# Patient Record
Sex: MALE | Race: WHITE | Employment: OTHER | URBAN - METROPOLITAN AREA
[De-identification: names, ages, dates, MRNs, and addresses within clinical notes are randomized per-mention and may not be internally consistent; named-entity substitution may affect disease eponyms.]

---

## 2023-05-24 ENCOUNTER — APPOINTMENT (OUTPATIENT)
Dept: GENERAL RADIOLOGY | Age: 88
End: 2023-05-24
Payer: COMMERCIAL

## 2023-05-24 ENCOUNTER — HOSPITAL ENCOUNTER (EMERGENCY)
Age: 88
Discharge: HOME | End: 2023-05-24
Attending: EMERGENCY MEDICINE
Payer: COMMERCIAL

## 2023-05-24 VITALS
SYSTOLIC BLOOD PRESSURE: 175 MMHG | OXYGEN SATURATION: 92 % | HEIGHT: 69 IN | DIASTOLIC BLOOD PRESSURE: 85 MMHG | BODY MASS INDEX: 20.44 KG/M2 | TEMPERATURE: 97.7 F | HEART RATE: 94 BPM | WEIGHT: 138 LBS | RESPIRATION RATE: 26 BRPM

## 2023-05-24 DIAGNOSIS — J18.9 PNEUMONIA DUE TO INFECTIOUS ORGANISM, UNSPECIFIED LATERALITY, UNSPECIFIED PART OF LUNG: Primary | ICD-10-CM

## 2023-05-24 DIAGNOSIS — R77.8 ELEVATED TROPONIN I LEVEL: ICD-10-CM

## 2023-05-24 LAB
ALBUMIN SERPL-MCNC: 2.6 G/DL (ref 3.5–5.2)
ALBUMIN/GLOB SERPL: 0.7 {RATIO} (ref 1–2.5)
ALP SERPL-CCNC: 96 U/L (ref 40–129)
ALT SERPL-CCNC: 27 U/L (ref 5–41)
ANION GAP SERPL CALCULATED.3IONS-SCNC: 13 MMOL/L (ref 9–17)
AST SERPL-CCNC: 37 U/L
BACTERIA URNS QL MICRO: ABNORMAL
BASOPHILS # BLD: 0 K/UL (ref 0–0.2)
BASOPHILS NFR BLD: 0 % (ref 0–2)
BILIRUB SERPL-MCNC: 0.4 MG/DL (ref 0.3–1.2)
BILIRUB UR QL STRIP: NEGATIVE
BUN SERPL-MCNC: 12 MG/DL (ref 8–23)
CALCIUM SERPL-MCNC: 8.3 MG/DL (ref 8.6–10.4)
CHARACTER UR: ABNORMAL
CHLORIDE SERPL-SCNC: 102 MMOL/L (ref 98–107)
CLARITY UR: CLEAR
CO2 SERPL-SCNC: 22 MMOL/L (ref 20–31)
COLOR UR: YELLOW
CREAT SERPL-MCNC: 0.69 MG/DL (ref 0.7–1.2)
EOSINOPHIL # BLD: 0 K/UL (ref 0–0.4)
EOSINOPHILS RELATIVE PERCENT: 0 % (ref 1–4)
EPI CELLS #/AREA URNS HPF: ABNORMAL /HPF (ref 0–5)
ERYTHROCYTE [DISTWIDTH] IN BLOOD BY AUTOMATED COUNT: 17.2 % (ref 12.5–15.4)
GFR SERPL CREATININE-BSD FRML MDRD: >60 ML/MIN/1.73M2
GLUCOSE SERPL-MCNC: 99 MG/DL (ref 70–99)
GLUCOSE UR STRIP-MCNC: NEGATIVE MG/DL
HCT VFR BLD AUTO: 42.8 % (ref 41–53)
HGB BLD-MCNC: 13.4 G/DL (ref 13.5–17.5)
HGB UR QL STRIP.AUTO: NEGATIVE
KETONES UR STRIP-MCNC: NEGATIVE MG/DL
LEUKOCYTE ESTERASE UR QL STRIP: NEGATIVE
LYMPHOCYTES # BLD: 16 % (ref 24–44)
LYMPHOCYTES NFR BLD: 1.6 K/UL (ref 1–4.8)
MCH RBC QN AUTO: 30.1 PG (ref 26–34)
MCHC RBC AUTO-ENTMCNC: 31.3 G/DL (ref 31–37)
MCV RBC AUTO: 96.1 FL (ref 80–100)
MONOCYTES NFR BLD: 0.9 K/UL (ref 0.1–1.2)
MONOCYTES NFR BLD: 9 % (ref 2–11)
NEUTROPHILS NFR BLD: 75 % (ref 36–66)
NEUTS SEG NFR BLD: 7.4 K/UL (ref 1.8–7.7)
NITRITE UR QL STRIP: NEGATIVE
PH UR STRIP: 7 [PH] (ref 5–8)
PLATELET # BLD AUTO: 234 K/UL (ref 140–450)
PMV BLD AUTO: 9.8 FL (ref 6–12)
POTASSIUM SERPL-SCNC: 3.9 MMOL/L (ref 3.7–5.3)
PROT SERPL-MCNC: 6.5 G/DL (ref 6.4–8.3)
PROT UR STRIP-MCNC: ABNORMAL MG/DL
RBC # BLD AUTO: 4.45 M/UL (ref 4.5–5.9)
RBC #/AREA URNS HPF: ABNORMAL /HPF (ref 0–2)
SODIUM SERPL-SCNC: 137 MMOL/L (ref 135–144)
SP GR UR STRIP: 1.01 (ref 1–1.03)
TROPONIN I SERPL HS-MCNC: 27 NG/L (ref 0–22)
TROPONIN I SERPL HS-MCNC: 32 NG/L (ref 0–22)
UROBILINOGEN UR STRIP-ACNC: NORMAL
WBC #/AREA URNS HPF: ABNORMAL /HPF (ref 0–5)
WBC OTHER # BLD: 9.9 K/UL (ref 3.5–11)

## 2023-05-24 PROCEDURE — 93005 ELECTROCARDIOGRAM TRACING: CPT | Performed by: PHYSICIAN ASSISTANT

## 2023-05-24 PROCEDURE — 71045 X-RAY EXAM CHEST 1 VIEW: CPT

## 2023-05-24 PROCEDURE — 81001 URINALYSIS AUTO W/SCOPE: CPT

## 2023-05-24 PROCEDURE — 6370000000 HC RX 637 (ALT 250 FOR IP): Performed by: PHYSICIAN ASSISTANT

## 2023-05-24 PROCEDURE — 84484 ASSAY OF TROPONIN QUANT: CPT

## 2023-05-24 PROCEDURE — 36415 COLL VENOUS BLD VENIPUNCTURE: CPT

## 2023-05-24 PROCEDURE — 99285 EMERGENCY DEPT VISIT HI MDM: CPT

## 2023-05-24 PROCEDURE — 85025 COMPLETE CBC W/AUTO DIFF WBC: CPT

## 2023-05-24 PROCEDURE — 80053 COMPREHEN METABOLIC PANEL: CPT

## 2023-05-24 RX ORDER — DOXYCYCLINE HYCLATE 100 MG
100 TABLET ORAL ONCE
Status: COMPLETED | OUTPATIENT
Start: 2023-05-24 | End: 2023-05-24

## 2023-05-24 RX ORDER — ASPIRIN 81 MG/1
81 TABLET ORAL DAILY
COMMUNITY
Start: 2019-06-06

## 2023-05-24 RX ORDER — DOXYCYCLINE HYCLATE 100 MG
100 TABLET ORAL 2 TIMES DAILY
Qty: 19 TABLET | Refills: 0 | Status: SHIPPED | OUTPATIENT
Start: 2023-05-24 | End: 2023-06-03

## 2023-05-24 RX ADMIN — DOXYCYCLINE HYCLATE 100 MG: 100 TABLET ORAL at 17:45

## 2023-05-24 ASSESSMENT — ENCOUNTER SYMPTOMS
ABDOMINAL PAIN: 0
EYE REDNESS: 0
WHEEZING: 0
EYE DISCHARGE: 0
SHORTNESS OF BREATH: 1
NAUSEA: 0
SORE THROAT: 0
BACK PAIN: 0
VOMITING: 0

## 2023-05-24 ASSESSMENT — PAIN - FUNCTIONAL ASSESSMENT: PAIN_FUNCTIONAL_ASSESSMENT: NONE - DENIES PAIN

## 2023-05-24 NOTE — ED NOTES
[facility] again-on hold currently waiting for an  to answer    Contacting: Hank Christensen  Patient: Sheron Perez  To reach Dr. Eileen Osorio, please call [phone] now as this number will  in five minutes.          Bailey Martinez RN  [phone]

## 2023-05-24 NOTE — ED NOTES
[facility] for consult -Alonso Arellano PA-C phoned [address], RN  05/24/23 0257

## 2023-05-24 NOTE — ED PROVIDER NOTES
[address] [facility] Emergency Department    [address] [address] [facility] [address] [facility] [address]  Phone: [phone]  Fax: [phone]  Emergency Department  Faculty Attestation    I performed a history and physical examination of the patient and discussed management with the mid level provideer. I reviewed the mid level provider's note and agree with the documented findings and plan of care. Any areas of disagreement are noted on the chart. I was personally present for the key portions of any procedures. I have documented in the chart those procedures where I was not present during the key portions. I have reviewed the emergency nurses triage note. I agree with the chief complaint, past medical history, past surgical history, allergies, medications, social and family history as documented unless otherwise noted below. Documentation of the HPI, Physical Exam and Medical Decision Making performed by medical students or scribes is based on my personal performance of the HPI, PE and MDM. For Physician Assistant/ Nurse Practitioner cases/documentation I have personally evaluated this patient and have completed at least one if not all key elements of the E/M (history, physical exam, and MDM). Additional findings are as noted.       Primary Care Physician:  JOSHUA Taylor - CNP    CHIEF COMPLAINT       Chief Complaint   Patient presents with    Other     Pt was taken off all medications (except daily aspirin) 2 weeks PTA; states he feels off; pt reports insomnia, decreased appetite, and decreased endurance with housework; denies pain       RECENT VITALS:   Temp: 97.7 °F (36.5 °C),  Pulse: 94, Respirations: 26, BP: (!) 175/85    LABS:  Labs Reviewed   CBC WITH AUTO DIFFERENTIAL - Abnormal; Notable for the following components:       Result Value    RBC 4.45 (*)     Hemoglobin 13.4 (*)     RDW 17.2 (*)     Seg Neutrophils 75 (*)     Lymphocytes 16 (*)     Eosinophils % 0 (*)     All other components within normal
Positive for shortness of breath. Negative for wheezing. Cardiovascular:  Negative for chest pain. Gastrointestinal:  Negative for abdominal pain, nausea and vomiting. Genitourinary:  Negative for dysuria and frequency. Musculoskeletal:  Negative for back pain and neck pain. Skin:  Negative for rash. Neurological:  Negative for seizures, syncope and headaches. Psychiatric/Behavioral:  Negative for confusion. PAST MEDICAL HISTORY    has a past medical history of Abdominal aortic atherosclerosis ([facility] [address] Arthritis, BPH (benign prostatic hyperplasia), CHF (congestive heart failure) ([facility] [address] Chronic kidney disease, COPD (chronic obstructive pulmonary disease) ([facility] [address] Hearing loss, History of intermittent claudication, Hyperlipidemia, Hypertension, and Spinal stenosis. SURGICAL HISTORY      has a past surgical history that includes Coronary artery bypass graft (1989); Eye surgery; knee surgery (Bilateral); and Tonsillectomy. CURRENT MEDICATIONS       Previous Medications    ASPIRIN 81 MG EC TABLET    Take 1 tablet by mouth daily     ALLERGIES     has No Known Allergies. FAMILY HISTORY     has no family status information on file. family history is not on file. SOCIAL HISTORY     Lives with his wife. Denies tobacco use. PHYSICAL EXAM     (7 for level 4, 8 or more for level 5)    ED Triage Vitals [05/24/23 1225]   BP Temp Temp Source Pulse Respirations SpO2 Height Weight - Scale   (!) 176/104 97.7 °F (36.5 °C) Skin 94 16 94 % 5' 9\" (1.753 m) 138 lb (62.6 kg)     Physical Exam  Vitals reviewed. Constitutional:       General: He is not in acute distress. Appearance: He is not toxic-appearing. Comments: Thin. HENT:      Head: Normocephalic and atraumatic. Mouth/Throat:      Mouth: Mucous membranes are moist.   Eyes:      General: No scleral icterus. Cardiovascular:      Rate and Rhythm: Normal rate and regular rhythm. Heart sounds: Normal heart sounds.    Pulmonary:

## 2023-05-24 NOTE — DISCHARGE INSTRUCTIONS
Please read and follow all instructions. Take doxycycline twice daily over the next 10 days. Call to schedule follow-up with her primary care doctor tomorrow. Return to the ER for development of chest pain, increased difficulty breathing, persistent fever above 101 °F, or any other concerning symptoms.

## 2023-05-24 NOTE — ED NOTES
Attempted peripheral iv x1 unable to establish-having phleb do blood draw     River Cartwright, RN  05/24/23 7525

## 2023-05-25 LAB
EKG ATRIAL RATE: 95 BPM
EKG P AXIS: 93 DEGREES
EKG P-R INTERVAL: 170 MS
EKG Q-T INTERVAL: 348 MS
EKG QRS DURATION: 98 MS
EKG QTC CALCULATION (BAZETT): 437 MS
EKG R AXIS: 40 DEGREES
EKG T AXIS: 73 DEGREES
EKG VENTRICULAR RATE: 95 BPM

## 2023-08-05 RX ORDER — ASPIRIN 81 MG/1
81 TABLET ORAL DAILY
COMMUNITY
End: 2023-08-05

## 2023-08-09 PROBLEM — N40.0 BPH (BENIGN PROSTATIC HYPERPLASIA): Status: ACTIVE | Noted: 2021-10-08

## 2023-08-09 PROBLEM — I50.22 CHRONIC SYSTOLIC HEART FAILURE (HCC): Status: ACTIVE | Noted: 2023-05-22

## 2023-08-09 PROBLEM — M17.9 OA (OSTEOARTHRITIS) OF KNEE: Status: ACTIVE | Noted: 2021-10-08

## 2023-08-09 PROBLEM — A04.72 INTESTINAL INFECTION DUE TO CLOSTRIDIUM DIFFICILE: Status: ACTIVE | Noted: 2019-07-16

## 2023-08-09 PROBLEM — N18.2 CHRONIC RENAL DISEASE, STAGE II: Status: ACTIVE | Noted: 2022-03-06

## 2023-08-09 PROBLEM — I11.0 HYPERTENSIVE HEART DISEASE WITH CONGESTIVE HEART FAILURE (HCC): Status: ACTIVE | Noted: 2023-05-24

## 2023-08-09 PROBLEM — J44.9 COPD (CHRONIC OBSTRUCTIVE PULMONARY DISEASE) (HCC): Status: ACTIVE | Noted: 2023-05-03

## 2023-08-09 PROBLEM — L89.159 PRESSURE ULCER OF SACRAL REGION, UNSPECIFIED STAGE: Status: ACTIVE | Noted: 2023-05-21

## 2023-08-09 NOTE — PROGRESS NOTES
[address] Family Medicine Residency  [address] [facility], [address] [facility]  Phone: [phone]  Fax: [phone]      Date of Visit:  2023  Patient Name: Marco A Kauffman   Patient :  1925     Assessment:     1. Chronic obstructive pulmonary disease, unspecified COPD type ([address])    2. Mixed hyperlipidemia    3. Hypertensive heart disease with congestive heart failure, unspecified heart failure type ([address])    4. Atherosclerosis of native coronary artery of native heart without angina pectoris    5. Chronic systolic heart failure ([address])    6. Primary osteoarthritis of both knees    7. Age-related cataract of both eyes, unspecified age-related cataract type    8. Benign prostatic hyperplasia without lower urinary tract symptoms    9. Former smoker    8. Bilateral hearing loss, unspecified hearing loss type    11. Chronic renal disease, stage II    12. Spinal stenosis, unspecified spinal region        Plan:      Please see patient instructions. At this time I do not think that the congestive heart failure is the cause of his 10 pound weight gain. I am most concerned about him falling. I called after the visit and cancelled his upcoming cardiology appointment. There are no Patient Instructions on file for this visit.      Requested Prescriptions      No

## 2023-08-14 ENCOUNTER — OFFICE VISIT (OUTPATIENT)
Age: 88
End: 2023-08-14
Payer: COMMERCIAL

## 2023-08-14 VITALS
TEMPERATURE: 97.7 F | WEIGHT: 121 LBS | RESPIRATION RATE: 14 BRPM | BODY MASS INDEX: 17.92 KG/M2 | DIASTOLIC BLOOD PRESSURE: 69 MMHG | HEART RATE: 45 BPM | SYSTOLIC BLOOD PRESSURE: 146 MMHG | HEIGHT: 69 IN

## 2023-08-14 DIAGNOSIS — N18.2 CHRONIC RENAL DISEASE, STAGE II: ICD-10-CM

## 2023-08-14 DIAGNOSIS — I50.22 CHRONIC SYSTOLIC HEART FAILURE (HCC): ICD-10-CM

## 2023-08-14 DIAGNOSIS — N40.0 BENIGN PROSTATIC HYPERPLASIA WITHOUT LOWER URINARY TRACT SYMPTOMS: ICD-10-CM

## 2023-08-14 DIAGNOSIS — E78.2 MIXED HYPERLIPIDEMIA: ICD-10-CM

## 2023-08-14 DIAGNOSIS — H25.9 AGE-RELATED CATARACT OF BOTH EYES, UNSPECIFIED AGE-RELATED CATARACT TYPE: ICD-10-CM

## 2023-08-14 DIAGNOSIS — I25.10 ATHEROSCLEROSIS OF NATIVE CORONARY ARTERY OF NATIVE HEART WITHOUT ANGINA PECTORIS: ICD-10-CM

## 2023-08-14 DIAGNOSIS — H91.93 BILATERAL HEARING LOSS, UNSPECIFIED HEARING LOSS TYPE: ICD-10-CM

## 2023-08-14 DIAGNOSIS — J44.9 CHRONIC OBSTRUCTIVE PULMONARY DISEASE, UNSPECIFIED COPD TYPE (HCC): ICD-10-CM

## 2023-08-14 DIAGNOSIS — M17.0 PRIMARY OSTEOARTHRITIS OF BOTH KNEES: ICD-10-CM

## 2023-08-14 DIAGNOSIS — M48.00 SPINAL STENOSIS, UNSPECIFIED SPINAL REGION: Primary | ICD-10-CM

## 2023-08-14 DIAGNOSIS — I11.0 HYPERTENSIVE HEART DISEASE WITH CONGESTIVE HEART FAILURE, UNSPECIFIED HEART FAILURE TYPE (HCC): ICD-10-CM

## 2023-08-14 DIAGNOSIS — Z87.891 FORMER SMOKER: ICD-10-CM

## 2023-08-14 PROCEDURE — 1123F ACP DISCUSS/DSCN MKR DOCD: CPT | Performed by: FAMILY MEDICINE

## 2023-08-14 PROCEDURE — 99214 OFFICE O/P EST MOD 30 MIN: CPT | Performed by: FAMILY MEDICINE

## 2023-08-14 RX ORDER — FINASTERIDE 5 MG/1
5 TABLET, FILM COATED ORAL DAILY
Qty: 30 TABLET | Refills: 3 | Status: SHIPPED | OUTPATIENT
Start: 2023-08-14

## 2023-08-14 RX ORDER — FINASTERIDE 5 MG/1
5 TABLET, FILM COATED ORAL DAILY
Qty: 30 TABLET | Refills: 3 | Status: SHIPPED | OUTPATIENT
Start: 2023-08-14 | End: 2023-08-14

## 2023-08-14 SDOH — ECONOMIC STABILITY: FOOD INSECURITY: WITHIN THE PAST 12 MONTHS, THE FOOD YOU BOUGHT JUST DIDN'T LAST AND YOU DIDN'T HAVE MONEY TO GET MORE.: NEVER TRUE

## 2023-08-14 SDOH — ECONOMIC STABILITY: HOUSING INSECURITY
IN THE LAST 12 MONTHS, WAS THERE A TIME WHEN YOU DID NOT HAVE A STEADY PLACE TO SLEEP OR SLEPT IN A SHELTER (INCLUDING NOW)?: NO

## 2023-08-14 SDOH — ECONOMIC STABILITY: FOOD INSECURITY: WITHIN THE PAST 12 MONTHS, YOU WORRIED THAT YOUR FOOD WOULD RUN OUT BEFORE YOU GOT MONEY TO BUY MORE.: NEVER TRUE

## 2023-08-14 SDOH — ECONOMIC STABILITY: INCOME INSECURITY: HOW HARD IS IT FOR YOU TO PAY FOR THE VERY BASICS LIKE FOOD, HOUSING, MEDICAL CARE, AND HEATING?: NOT HARD AT ALL

## 2023-08-14 ASSESSMENT — ENCOUNTER SYMPTOMS
DIARRHEA: 0
VOMITING: 0
CONSTIPATION: 0

## 2023-08-14 ASSESSMENT — PATIENT HEALTH QUESTIONNAIRE - PHQ9
SUM OF ALL RESPONSES TO PHQ QUESTIONS 1-9: 0
SUM OF ALL RESPONSES TO PHQ9 QUESTIONS 1 & 2: 0
SUM OF ALL RESPONSES TO PHQ QUESTIONS 1-9: 0
1. LITTLE INTEREST OR PLEASURE IN DOING THINGS: 0
SUM OF ALL RESPONSES TO PHQ QUESTIONS 1-9: 0
SUM OF ALL RESPONSES TO PHQ QUESTIONS 1-9: 0
2. FEELING DOWN, DEPRESSED OR HOPELESS: 0

## 2023-08-14 NOTE — PATIENT INSTRUCTIONS
Adding back one of your prostate medicines the finasteride which you should take once a day. This should help with you getting up at night to urinate. It will not drop your blood pressure. I would like you to use Ace wraps for your lower extremity swelling. I am going to order physical therapy just for balance and home exercises. I will also give you a handicap sticker. For constipation, I would like you to get some senna as over-the-counter. Use that in the afternoon or early evening for a bowel movement in the morning when you feel like you have to go. It should be taken with water at least 6 to 8 ounces. For leg pain, I would like to keep you at less than a 5/10. I will order some Voltaren gel which you can put on your knees to see if it helps. I would like to see you back in 3 months.

## 2023-11-15 ENCOUNTER — OFFICE VISIT (OUTPATIENT)
Age: 88
End: 2023-11-15

## 2023-11-15 VITALS
RESPIRATION RATE: 14 BRPM | HEIGHT: 69 IN | WEIGHT: 121.8 LBS | BODY MASS INDEX: 18.04 KG/M2 | HEART RATE: 87 BPM | SYSTOLIC BLOOD PRESSURE: 142 MMHG | DIASTOLIC BLOOD PRESSURE: 72 MMHG | TEMPERATURE: 97.4 F

## 2023-11-15 DIAGNOSIS — S60.221A TRAUMATIC ECCHYMOSIS OF RIGHT HAND, INITIAL ENCOUNTER: Primary | ICD-10-CM

## 2023-11-15 RX ORDER — IBUPROFEN 200 MG
200 TABLET ORAL EVERY 6 HOURS PRN
COMMUNITY

## 2023-11-15 NOTE — PROGRESS NOTES
[address] [facility]. [facility] Family Medicine Residency  [address] [facility], [address] [facility]  Phone: [phone]  Fax: [phone]      Date of Visit: 11/15/2023    Patient Name: Aj Montero   Patient :  1925     ASSESSMENT/PLAN   1. Traumatic ecchymosis of right hand, initial encounter  Comments:  Bruised his right hand to forearm when putting pants on a . The spring caused the  to snap back leave swelling and discoloration. No work-up needed    Follow-up with Dr. Volanda Libman on Monday. See communications section to see instructions provided to patient  Return if symptoms worsen or fail to improve. HPI    Aj Montero is a 80 y.o. male who presents today to discuss   Chief Complaint   Patient presents with    Hand Injury     Pt states he was working on a clothes  that had a spring tension that snapped and hit his right hand. Right hand bruised partially up lower part of arm. Patient denies any pain     Aj Montero  has a past medical history of BPH (benign prostatic hyperplasia), Cataracts, bilateral, COPD (chronic obstructive pulmonary disease) ([address]), Former smoker, Hearing loss, History of intermittent claudication, Hyperlipidemia, Intestinal infection due to Clostridium difficile (2019), OA (osteoarthritis) of knee, Pneumonia, Prediabetes, and Spinal stenosis. Yesterday he was hanging up pants on hangers that are spring-loaded when the  snapped back onto his right hand. At the time of the injury he had pain that dissipated and 3 to 4 hours after the injury he noticed a 8 mm swelling centrally located on the dorsal side of his right hand. About 6 to 7 hours after the injury he started noticing discoloration that progressively spread from below the fingertips to the middle portion of his forearm. The discoloration has continued to spread (see pictures). He is currently taking aspirin 81 mg at night.   He has not noticed any changes in his ability

## 2023-11-20 ENCOUNTER — OFFICE VISIT (OUTPATIENT)
Age: 88
End: 2023-11-20
Payer: COMMERCIAL

## 2023-11-20 VITALS
SYSTOLIC BLOOD PRESSURE: 137 MMHG | HEART RATE: 88 BPM | RESPIRATION RATE: 16 BRPM | BODY MASS INDEX: 17.92 KG/M2 | DIASTOLIC BLOOD PRESSURE: 88 MMHG | HEIGHT: 69 IN | WEIGHT: 121 LBS

## 2023-11-20 DIAGNOSIS — M48.00 SPINAL STENOSIS, UNSPECIFIED SPINAL REGION: ICD-10-CM

## 2023-11-20 DIAGNOSIS — J44.9 CHRONIC OBSTRUCTIVE PULMONARY DISEASE, UNSPECIFIED COPD TYPE (HCC): ICD-10-CM

## 2023-11-20 DIAGNOSIS — I50.22 CHRONIC SYSTOLIC HEART FAILURE (HCC): ICD-10-CM

## 2023-11-20 DIAGNOSIS — M17.0 PRIMARY OSTEOARTHRITIS OF BOTH KNEES: ICD-10-CM

## 2023-11-20 DIAGNOSIS — N40.0 BENIGN PROSTATIC HYPERPLASIA WITHOUT LOWER URINARY TRACT SYMPTOMS: ICD-10-CM

## 2023-11-20 DIAGNOSIS — I11.0 HYPERTENSIVE HEART DISEASE WITH CONGESTIVE HEART FAILURE, UNSPECIFIED HEART FAILURE TYPE (HCC): ICD-10-CM

## 2023-11-20 DIAGNOSIS — Z23 NEED FOR IMMUNIZATION AGAINST INFLUENZA: Primary | ICD-10-CM

## 2023-11-20 DIAGNOSIS — N18.2 CHRONIC RENAL DISEASE, STAGE II: ICD-10-CM

## 2023-11-20 DIAGNOSIS — I25.10 ATHEROSCLEROSIS OF NATIVE CORONARY ARTERY OF NATIVE HEART WITHOUT ANGINA PECTORIS: ICD-10-CM

## 2023-11-20 PROCEDURE — 90694 VACC AIIV4 NO PRSRV 0.5ML IM: CPT | Performed by: FAMILY MEDICINE

## 2023-11-20 PROCEDURE — G0008 ADMIN INFLUENZA VIRUS VAC: HCPCS | Performed by: FAMILY MEDICINE

## 2023-11-20 PROCEDURE — 99214 OFFICE O/P EST MOD 30 MIN: CPT | Performed by: FAMILY MEDICINE

## 2023-11-20 PROCEDURE — 1123F ACP DISCUSS/DSCN MKR DOCD: CPT | Performed by: FAMILY MEDICINE

## 2023-11-20 RX ORDER — FINASTERIDE 5 MG/1
5 TABLET, FILM COATED ORAL DAILY
Qty: 30 TABLET | Refills: 0 | Status: SHIPPED | OUTPATIENT
Start: 2023-11-20 | End: 2023-11-20 | Stop reason: SDUPTHER

## 2023-11-20 RX ORDER — FINASTERIDE 5 MG/1
5 TABLET, FILM COATED ORAL DAILY
Qty: 90 TABLET | Refills: 3 | Status: ON HOLD | OUTPATIENT
Start: 2023-11-20

## 2023-11-20 ASSESSMENT — ENCOUNTER SYMPTOMS
DIARRHEA: 0
CONSTIPATION: 0
VOMITING: 0

## 2023-11-20 NOTE — PATIENT INSTRUCTIONS
Glad to see both of you today. Please wear compression stockings when you get up in the morning. You may need to try a few different sizes so that they are not too tight at night. We will keep you on your current medication for urinating but I would like you to sit down when you urinate and stay there for a few minutes before you go to bed then stand up and sit down again to try and clear out the rest of that bladder. If this is not helping, we may need to readjust your medicines. For the soreness on your buttocks, I would get a \"doughnut\" like a children's circular life preserver. This will take some of the pressure off of that area when you are sitting. If insurance will cover it, let me know and I can order it. I will see you back in 3 months.

## 2023-11-21 ENCOUNTER — APPOINTMENT (OUTPATIENT)
Dept: GENERAL RADIOLOGY | Age: 88
DRG: 871 | End: 2023-11-21
Payer: COMMERCIAL

## 2023-11-21 ENCOUNTER — TELEPHONE (OUTPATIENT)
Age: 88
End: 2023-11-21

## 2023-11-21 ENCOUNTER — APPOINTMENT (OUTPATIENT)
Dept: CT IMAGING | Age: 88
DRG: 871 | End: 2023-11-21
Payer: COMMERCIAL

## 2023-11-21 ENCOUNTER — HOSPITAL ENCOUNTER (INPATIENT)
Age: 88
LOS: 7 days | Discharge: HOME | DRG: 871 | End: 2023-11-28
Attending: EMERGENCY MEDICINE | Admitting: FAMILY MEDICINE
Payer: COMMERCIAL

## 2023-11-21 DIAGNOSIS — R06.02 SHORTNESS OF BREATH: ICD-10-CM

## 2023-11-21 DIAGNOSIS — R09.02 HYPOXIA: ICD-10-CM

## 2023-11-21 DIAGNOSIS — R53.83 FATIGUE, UNSPECIFIED TYPE: Primary | ICD-10-CM

## 2023-11-21 DIAGNOSIS — I50.21 ACUTE SYSTOLIC CONGESTIVE HEART FAILURE (HCC): ICD-10-CM

## 2023-11-21 DIAGNOSIS — I25.810 CORONARY ARTERY DISEASE INVOLVING CORONARY BYPASS GRAFT OF NATIVE HEART WITHOUT ANGINA PECTORIS: ICD-10-CM

## 2023-11-21 PROBLEM — E87.20 SEVERE SEPSIS WITH LACTIC ACIDOSIS (HCC): Status: ACTIVE | Noted: 2023-11-21

## 2023-11-21 PROBLEM — A41.9 SEVERE SEPSIS WITH LACTIC ACIDOSIS (HCC): Status: ACTIVE | Noted: 2023-11-21

## 2023-11-21 PROBLEM — J44.1 ACUTE EXACERBATION OF CHRONIC OBSTRUCTIVE PULMONARY DISEASE (COPD) (HCC): Status: ACTIVE | Noted: 2023-05-03

## 2023-11-21 PROBLEM — R65.20 SEVERE SEPSIS WITH LACTIC ACIDOSIS (HCC): Status: ACTIVE | Noted: 2023-11-21

## 2023-11-21 PROBLEM — G93.41 ACUTE METABOLIC ENCEPHALOPATHY: Status: ACTIVE | Noted: 2023-11-21

## 2023-11-21 PROBLEM — I50.9 ACUTE EXACERBATION OF CHF (CONGESTIVE HEART FAILURE) (HCC): Status: ACTIVE | Noted: 2023-11-21

## 2023-11-21 LAB
ALBUMIN SERPL-MCNC: 3.4 G/DL (ref 3.5–5.2)
ALBUMIN/GLOB SERPL: 0.9 {RATIO} (ref 1–2.5)
ALP SERPL-CCNC: 74 U/L (ref 40–129)
ALT SERPL-CCNC: 7 U/L (ref 5–41)
ANION GAP SERPL CALCULATED.3IONS-SCNC: 13 MMOL/L (ref 9–17)
AST SERPL-CCNC: 18 U/L
BASOPHILS # BLD: 0 K/UL (ref 0–0.2)
BASOPHILS NFR BLD: 0 % (ref 0–2)
BILIRUB SERPL-MCNC: 1.4 MG/DL (ref 0.3–1.2)
BNP SERPL-MCNC: ABNORMAL PG/ML
BUN SERPL-MCNC: 30 MG/DL (ref 8–23)
CALCIUM SERPL-MCNC: 8.8 MG/DL (ref 8.6–10.4)
CHLORIDE SERPL-SCNC: 96 MMOL/L (ref 98–107)
CO2 SERPL-SCNC: 27 MMOL/L (ref 20–31)
CREAT SERPL-MCNC: 1.5 MG/DL (ref 0.7–1.2)
CRP SERPL HS-MCNC: 225.5 MG/L (ref 0–5)
D DIMER PPP FEU-MCNC: 4.03 UG/ML FEU
EOSINOPHIL # BLD: 0 K/UL (ref 0–0.4)
EOSINOPHILS RELATIVE PERCENT: 0 % (ref 1–4)
ERYTHROCYTE [DISTWIDTH] IN BLOOD BY AUTOMATED COUNT: 16 % (ref 12.5–15.4)
ERYTHROCYTE [SEDIMENTATION RATE] IN BLOOD BY PHOTOMETRIC METHOD: 19 MM/HR (ref 0–20)
FLUAV AG SPEC QL: NEGATIVE
FLUBV AG SPEC QL: NEGATIVE
GFR SERPL CREATININE-BSD FRML MDRD: 42 ML/MIN/1.73M2
GLUCOSE SERPL-MCNC: 88 MG/DL (ref 70–99)
HCT VFR BLD AUTO: 39.1 % (ref 41–53)
HGB BLD-MCNC: 13.1 G/DL (ref 13.5–17.5)
LACTATE BLDV-SCNC: 1.8 MMOL/L (ref 0.5–1.9)
LACTATE BLDV-SCNC: 1.9 MMOL/L (ref 0.5–1.9)
LACTATE BLDV-SCNC: 2.8 MMOL/L (ref 0.5–2.2)
LYMPHOCYTES NFR BLD: 0.45 K/UL (ref 1–4.8)
LYMPHOCYTES RELATIVE PERCENT: 5 % (ref 24–44)
MCH RBC QN AUTO: 30.9 PG (ref 26–34)
MCHC RBC AUTO-ENTMCNC: 33.5 G/DL (ref 31–37)
MCV RBC AUTO: 92.2 FL (ref 80–100)
MONOCYTES NFR BLD: 0.27 K/UL (ref 0.1–0.8)
MONOCYTES NFR BLD: 3 % (ref 1–7)
MORPHOLOGY: ABNORMAL
MORPHOLOGY: ABNORMAL
NEUTROPHILS NFR BLD: 92 % (ref 36–66)
NEUTS SEG NFR BLD: 8.18 K/UL (ref 1.8–7.7)
PLATELET # BLD AUTO: 178 K/UL (ref 140–450)
PMV BLD AUTO: 9.9 FL (ref 6–12)
POTASSIUM SERPL-SCNC: 3.9 MMOL/L (ref 3.7–5.3)
PROT SERPL-MCNC: 7.2 G/DL (ref 6.4–8.3)
RBC # BLD AUTO: 4.25 M/UL (ref 4.5–5.9)
SARS-COV-2 RDRP RESP QL NAA+PROBE: NOT DETECTED
SODIUM SERPL-SCNC: 136 MMOL/L (ref 135–144)
SPECIMEN DESCRIPTION: NORMAL
TROPONIN I SERPL HS-MCNC: 30 NG/L (ref 0–22)
TROPONIN I SERPL HS-MCNC: 35 NG/L (ref 0–22)
TSH SERPL DL<=0.05 MIU/L-ACNC: 0.79 UIU/ML (ref 0.3–5)
WBC OTHER # BLD: 8.9 K/UL (ref 3.5–11)

## 2023-11-21 PROCEDURE — 87040 BLOOD CULTURE FOR BACTERIA: CPT

## 2023-11-21 PROCEDURE — 36415 COLL VENOUS BLD VENIPUNCTURE: CPT

## 2023-11-21 PROCEDURE — 84484 ASSAY OF TROPONIN QUANT: CPT

## 2023-11-21 PROCEDURE — 71045 X-RAY EXAM CHEST 1 VIEW: CPT

## 2023-11-21 PROCEDURE — 6360000004 HC RX CONTRAST MEDICATION: Performed by: EMERGENCY MEDICINE

## 2023-11-21 PROCEDURE — 87635 SARS-COV-2 COVID-19 AMP PRB: CPT

## 2023-11-21 PROCEDURE — 99285 EMERGENCY DEPT VISIT HI MDM: CPT

## 2023-11-21 PROCEDURE — 85025 COMPLETE CBC W/AUTO DIFF WBC: CPT

## 2023-11-21 PROCEDURE — 84443 ASSAY THYROID STIM HORMONE: CPT

## 2023-11-21 PROCEDURE — 80053 COMPREHEN METABOLIC PANEL: CPT

## 2023-11-21 PROCEDURE — 83880 ASSAY OF NATRIURETIC PEPTIDE: CPT

## 2023-11-21 PROCEDURE — 85652 RBC SED RATE AUTOMATED: CPT

## 2023-11-21 PROCEDURE — 6360000002 HC RX W HCPCS

## 2023-11-21 PROCEDURE — 83605 ASSAY OF LACTIC ACID: CPT

## 2023-11-21 PROCEDURE — 71260 CT THORAX DX C+: CPT

## 2023-11-21 PROCEDURE — 2580000003 HC RX 258

## 2023-11-21 PROCEDURE — 86140 C-REACTIVE PROTEIN: CPT

## 2023-11-21 PROCEDURE — 87804 INFLUENZA ASSAY W/OPTIC: CPT

## 2023-11-21 PROCEDURE — 1210000000 HC MED SURG R&B

## 2023-11-21 PROCEDURE — 85379 FIBRIN DEGRADATION QUANT: CPT

## 2023-11-21 PROCEDURE — 2580000003 HC RX 258: Performed by: EMERGENCY MEDICINE

## 2023-11-21 PROCEDURE — 93005 ELECTROCARDIOGRAM TRACING: CPT | Performed by: EMERGENCY MEDICINE

## 2023-11-21 RX ORDER — POLYETHYLENE GLYCOL 3350 17 G/17G
17 POWDER, FOR SOLUTION ORAL DAILY PRN
Status: DISCONTINUED | OUTPATIENT
Start: 2023-11-21 | End: 2023-11-28 | Stop reason: HOSPADM

## 2023-11-21 RX ORDER — SODIUM CHLORIDE 9 MG/ML
INJECTION, SOLUTION INTRAVENOUS PRN
Status: DISCONTINUED | OUTPATIENT
Start: 2023-11-21 | End: 2023-11-28 | Stop reason: HOSPADM

## 2023-11-21 RX ORDER — ENOXAPARIN SODIUM 100 MG/ML
30 INJECTION SUBCUTANEOUS DAILY
Status: DISCONTINUED | OUTPATIENT
Start: 2023-11-22 | End: 2023-11-26

## 2023-11-21 RX ORDER — SODIUM CHLORIDE 0.9 % (FLUSH) 0.9 %
5-40 SYRINGE (ML) INJECTION EVERY 12 HOURS SCHEDULED
Status: DISCONTINUED | OUTPATIENT
Start: 2023-11-21 | End: 2023-11-28 | Stop reason: HOSPADM

## 2023-11-21 RX ORDER — ACETAMINOPHEN 650 MG/1
650 SUPPOSITORY RECTAL EVERY 6 HOURS PRN
Status: DISCONTINUED | OUTPATIENT
Start: 2023-11-21 | End: 2023-11-28 | Stop reason: HOSPADM

## 2023-11-21 RX ORDER — ATORVASTATIN CALCIUM 10 MG/1
10 TABLET, FILM COATED ORAL DAILY
Status: DISCONTINUED | OUTPATIENT
Start: 2023-11-22 | End: 2023-11-28 | Stop reason: HOSPADM

## 2023-11-21 RX ORDER — SODIUM CHLORIDE 0.9 % (FLUSH) 0.9 %
5-40 SYRINGE (ML) INJECTION PRN
Status: DISCONTINUED | OUTPATIENT
Start: 2023-11-21 | End: 2023-11-28 | Stop reason: HOSPADM

## 2023-11-21 RX ORDER — 0.9 % SODIUM CHLORIDE 0.9 %
80 INTRAVENOUS SOLUTION INTRAVENOUS ONCE
Status: DISCONTINUED | OUTPATIENT
Start: 2023-11-21 | End: 2023-11-28 | Stop reason: HOSPADM

## 2023-11-21 RX ORDER — ACETAMINOPHEN 325 MG/1
650 TABLET ORAL EVERY 6 HOURS PRN
Status: DISCONTINUED | OUTPATIENT
Start: 2023-11-21 | End: 2023-11-28 | Stop reason: HOSPADM

## 2023-11-21 RX ORDER — 0.9 % SODIUM CHLORIDE 0.9 %
30 INTRAVENOUS SOLUTION INTRAVENOUS ONCE
Status: DISCONTINUED | OUTPATIENT
Start: 2023-11-21 | End: 2023-11-28 | Stop reason: HOSPADM

## 2023-11-21 RX ORDER — ONDANSETRON 4 MG/1
4 TABLET, ORALLY DISINTEGRATING ORAL EVERY 8 HOURS PRN
Status: DISCONTINUED | OUTPATIENT
Start: 2023-11-21 | End: 2023-11-28 | Stop reason: HOSPADM

## 2023-11-21 RX ORDER — SODIUM CHLORIDE 0.9 % (FLUSH) 0.9 %
10 SYRINGE (ML) INJECTION PRN
Status: DISCONTINUED | OUTPATIENT
Start: 2023-11-21 | End: 2023-11-28 | Stop reason: HOSPADM

## 2023-11-21 RX ORDER — ONDANSETRON 2 MG/ML
4 INJECTION INTRAMUSCULAR; INTRAVENOUS EVERY 6 HOURS PRN
Status: DISCONTINUED | OUTPATIENT
Start: 2023-11-21 | End: 2023-11-28 | Stop reason: HOSPADM

## 2023-11-21 RX ORDER — FINASTERIDE 5 MG/1
5 TABLET, FILM COATED ORAL DAILY
Status: DISCONTINUED | OUTPATIENT
Start: 2023-11-22 | End: 2023-11-28 | Stop reason: HOSPADM

## 2023-11-21 RX ORDER — SODIUM CHLORIDE 9 MG/ML
INJECTION, SOLUTION INTRAVENOUS CONTINUOUS
Status: DISCONTINUED | OUTPATIENT
Start: 2023-11-21 | End: 2023-11-23

## 2023-11-21 RX ORDER — ASPIRIN 81 MG/1
81 TABLET ORAL DAILY
Status: DISCONTINUED | OUTPATIENT
Start: 2023-11-22 | End: 2023-11-28 | Stop reason: ALTCHOICE

## 2023-11-21 RX ORDER — SIMVASTATIN 20 MG
20 TABLET ORAL NIGHTLY
Status: ON HOLD | COMMUNITY
End: 2023-11-28 | Stop reason: HOSPADM

## 2023-11-21 RX ADMIN — SODIUM CHLORIDE: 9 INJECTION, SOLUTION INTRAVENOUS at 21:16

## 2023-11-21 RX ADMIN — Medication 80 ML: at 19:00

## 2023-11-21 RX ADMIN — SODIUM CHLORIDE, PRESERVATIVE FREE 10 ML: 5 INJECTION INTRAVENOUS at 19:00

## 2023-11-21 RX ADMIN — CEFEPIME 2000 MG: 2 INJECTION, POWDER, FOR SOLUTION INTRAVENOUS at 21:18

## 2023-11-21 RX ADMIN — IOPAMIDOL 75 ML: 755 INJECTION, SOLUTION INTRAVENOUS at 18:56

## 2023-11-21 ASSESSMENT — PAIN SCALES - GENERAL: PAINLEVEL_OUTOF10: 0

## 2023-11-21 ASSESSMENT — ENCOUNTER SYMPTOMS
SORE THROAT: 0
DIARRHEA: 0
WHEEZING: 0
SHORTNESS OF BREATH: 0
VOMITING: 0
BACK PAIN: 0
COUGH: 0
ABDOMINAL PAIN: 0
NAUSEA: 0

## 2023-11-21 ASSESSMENT — PAIN - FUNCTIONAL ASSESSMENT: PAIN_FUNCTIONAL_ASSESSMENT: 0-10

## 2023-11-21 NOTE — ED PROVIDER NOTES
straw.   4. No penetration or aspiration with the remainder of the administered   substances. Please see separate speech pathology report for full discussion of findings   and recommendations. XR CHEST PORTABLE   Final Result   More prominent basilar opacities, which may be due to a combination of   increasing atelectasis/airspace disease as well as effusions. CT CHEST PULMONARY EMBOLISM W CONTRAST   Final Result   1. No pulmonary embolism. 2. Confluent airspace opacities in the lower lobes and right middle lobe   which involve the right lower lobe the greatest degree. These are   nonspecific, but pneumonia is favored. There is also mucous plugging in the   lower lobes. 3. Moderate right and small left pleural effusions. 4. Borderline cardiomegaly. XR CHEST PORTABLE   Final Result   Chronic bilateral basal infiltrates similar to prior examination. FL ESOPHAGRAM    (Results Pending)       I have reviewed the disposition diagnosis with the patient and or their family/guardian. I have answered their questions and givendischarge instructions. They voiced understanding of these instructions and did not have any further questions or complaints. PROCEDURES:  Unless otherwise noted below, none     Procedures    FINAL IMPRESSION      1. Fatigue, unspecified type    2. Hypoxia    3. Acute systolic congestive heart failure ([address])    4. Shortness of breath    5.  Coronary artery disease involving coronary bypass graft of native heart without angina pectoris          DISPOSITION/PLAN   DISPOSITION Admitted 11/21/2023 04:51:09 PM      PATIENT REFERRED TO:  Yecenia Fry MD  [address] [facility]  [phone]    Follow up in 2 day(s)  Call to schedule post-hospitalization follow-up    Gunjan Diane MD  [address] [facility] [phone]    Follow up in 2 week(s)  Call to schedule post-hospitalization follow-up    Radha Mendenhall, [address] [place] [address]

## 2023-11-21 NOTE — TELEPHONE ENCOUNTER
Patient wife called and stated that the \"patient has lost his mind and he is going crazy\". Patient wife was told to take patient to the ER.

## 2023-11-21 NOTE — H&P
therapies and most importantly because of direct risk to patient if care not provided in a hospital setting. Expected length of stay > 48 hours. Patient is admitted in the Med/Surge    Telemetry: Continuous  Anticoagulation: Lovenox  Dispo: Home assisted living  CODE STATUS DNR CCA    Patient was seen, evaluated and discussed with MD Ketan Spann MD  Family Medicine Resident, PGY-1   11/21/2023  10:25 PM    Senior Resident Attestation:     I have independently seen Patient and discussed the assessment and plan with the resident listed above and the attending physician Dr. Debra Yang. I have reviewed the note and have edited it as appropriate.       Brayan Tang MD  Family Medicine Resident, PGY-3  11/21/2023       Copy sent to Dr. Waqas Godfrey MD

## 2023-11-21 NOTE — TELEPHONE ENCOUNTER
After lunch yesterday, Edy Norris became very sick. Throwing up, fever, chills, soiled himself and was a little disoriented. Today he is fine up walking around and feeling much better. No fever. Ace Lomeli wanted to let you know.

## 2023-11-22 ENCOUNTER — APPOINTMENT (OUTPATIENT)
Age: 88
DRG: 871 | End: 2023-11-22
Payer: COMMERCIAL

## 2023-11-22 PROBLEM — J90 BILATERAL PLEURAL EFFUSION: Status: ACTIVE | Noted: 2023-11-22

## 2023-11-22 PROBLEM — Z91.89 AT RISK FOR FLUID VOLUME OVERLOAD: Status: ACTIVE | Noted: 2023-11-22

## 2023-11-22 PROBLEM — R00.0 TACHYCARDIA: Status: ACTIVE | Noted: 2023-11-22

## 2023-11-22 PROBLEM — I51.7 CARDIOMEGALY: Status: ACTIVE | Noted: 2023-11-22

## 2023-11-22 LAB
ANION GAP SERPL CALCULATED.3IONS-SCNC: 10 MMOL/L (ref 9–17)
ANION GAP SERPL CALCULATED.3IONS-SCNC: 13 MMOL/L (ref 9–17)
BASOPHILS # BLD: 0 K/UL (ref 0–0.2)
BASOPHILS NFR BLD: 0 % (ref 0–2)
BUN SERPL-MCNC: 34 MG/DL (ref 8–23)
BUN SERPL-MCNC: 34 MG/DL (ref 8–23)
CALCIUM SERPL-MCNC: 8.5 MG/DL (ref 8.6–10.4)
CALCIUM SERPL-MCNC: 8.6 MG/DL (ref 8.6–10.4)
CHLORIDE SERPL-SCNC: 102 MMOL/L (ref 98–107)
CHLORIDE SERPL-SCNC: 102 MMOL/L (ref 98–107)
CO2 SERPL-SCNC: 22 MMOL/L (ref 20–31)
CO2 SERPL-SCNC: 26 MMOL/L (ref 20–31)
CREAT SERPL-MCNC: 1.1 MG/DL (ref 0.7–1.2)
CREAT SERPL-MCNC: 1.1 MG/DL (ref 0.7–1.2)
ECHO AO ASC DIAM: 3.9 CM
ECHO AO ASCENDING AORTA INDEX: 2.34 CM/M2
ECHO AO ROOT DIAM: 3.3 CM
ECHO AO ROOT INDEX: 1.98 CM/M2
ECHO AV AREA PEAK VELOCITY: 2.9 CM2
ECHO AV AREA/BSA PEAK VELOCITY: 1.7 CM2/M2
ECHO AV PEAK GRADIENT: 4 MMHG
ECHO AV PEAK VELOCITY: 1 M/S
ECHO AV VELOCITY RATIO: 0.9
ECHO BSA: 1.66 M2
ECHO EST RA PRESSURE: 10 MMHG
ECHO IVC PROX: 1.7 CM
ECHO LA AREA 2C: 16 CM2
ECHO LA AREA 4C: 15.6 CM2
ECHO LA DIAMETER INDEX: 2.28 CM/M2
ECHO LA DIAMETER: 3.8 CM
ECHO LA MAJOR AXIS: 5.5 CM
ECHO LA MINOR AXIS: 5.2 CM
ECHO LA TO AORTIC ROOT RATIO: 1.15
ECHO LA VOL BP: 37 ML (ref 18–58)
ECHO LA VOL MOD A2C: 40 ML (ref 18–58)
ECHO LA VOL MOD A4C: 33 ML (ref 18–58)
ECHO LA VOL/BSA BIPLANE: 22 ML/M2 (ref 16–34)
ECHO LA VOLUME INDEX MOD A2C: 24 ML/M2 (ref 16–34)
ECHO LA VOLUME INDEX MOD A4C: 20 ML/M2 (ref 16–34)
ECHO LV E' LATERAL VELOCITY: 8 CM/S
ECHO LV E' SEPTAL VELOCITY: 6 CM/S
ECHO LV EDV A2C: 96 ML
ECHO LV EDV A4C: 104 ML
ECHO LV EDV INDEX A4C: 62 ML/M2
ECHO LV EDV NDEX A2C: 57 ML/M2
ECHO LV EJECTION FRACTION A2C: 25 %
ECHO LV EJECTION FRACTION A4C: 25 %
ECHO LV EJECTION FRACTION BIPLANE: 26 % (ref 55–100)
ECHO LV ESV A2C: 72 ML
ECHO LV ESV A4C: 78 ML
ECHO LV ESV INDEX A2C: 43 ML/M2
ECHO LV ESV INDEX A4C: 47 ML/M2
ECHO LV FRACTIONAL SHORTENING: 7 % (ref 28–44)
ECHO LV INTERNAL DIMENSION DIASTOLE INDEX: 3.41 CM/M2
ECHO LV INTERNAL DIMENSION DIASTOLIC: 5.7 CM (ref 4.2–5.9)
ECHO LV INTERNAL DIMENSION SYSTOLIC INDEX: 3.17 CM/M2
ECHO LV INTERNAL DIMENSION SYSTOLIC: 5.3 CM
ECHO LV IVSD: 1.1 CM (ref 0.6–1)
ECHO LV MASS 2D: 226.4 G (ref 88–224)
ECHO LV MASS INDEX 2D: 135.5 G/M2 (ref 49–115)
ECHO LV POSTERIOR WALL DIASTOLIC: 0.9 CM (ref 0.6–1)
ECHO LV RELATIVE WALL THICKNESS RATIO: 0.32
ECHO LVOT AREA: 3.5 CM2
ECHO LVOT DIAM: 2.1 CM
ECHO LVOT PEAK GRADIENT: 3 MMHG
ECHO LVOT PEAK VELOCITY: 0.9 M/S
ECHO MV A VELOCITY: 1.08 M/S
ECHO MV E DECELERATION TIME (DT): 95 MS
ECHO MV E VELOCITY: 0.52 M/S
ECHO MV E/A RATIO: 0.48
ECHO MV E/E' LATERAL: 6.5
ECHO MV E/E' RATIO (AVERAGED): 7.58
ECHO PV MAX VELOCITY: 0.8 M/S
ECHO PV PEAK GRADIENT: 3 MMHG
ECHO RA AREA 4C: 13.9 CM2
ECHO RA END SYSTOLIC VOLUME APICAL 4 CHAMBER INDEX BSA: 21 ML/M2
ECHO RA VOLUME: 35 ML
ECHO RIGHT VENTRICULAR SYSTOLIC PRESSURE (RVSP): 33 MMHG
ECHO RV BASAL DIMENSION: 3.5 CM
ECHO RV FREE WALL PEAK S': 12 CM/S
ECHO TV REGURGITANT MAX VELOCITY: 2.4 M/S
ECHO TV REGURGITANT PEAK GRADIENT: 23 MMHG
EKG ATRIAL RATE: 94 BPM
EKG P AXIS: 109 DEGREES
EKG P-R INTERVAL: 182 MS
EKG Q-T INTERVAL: 356 MS
EKG QRS DURATION: 94 MS
EKG QTC CALCULATION (BAZETT): 445 MS
EKG R AXIS: 29 DEGREES
EKG T AXIS: 139 DEGREES
EKG VENTRICULAR RATE: 94 BPM
EOSINOPHIL # BLD: 0 K/UL (ref 0–0.4)
EOSINOPHILS RELATIVE PERCENT: 0 % (ref 1–4)
ERYTHROCYTE [DISTWIDTH] IN BLOOD BY AUTOMATED COUNT: 15.9 % (ref 12.5–15.4)
GFR SERPL CREATININE-BSD FRML MDRD: >60 ML/MIN/1.73M2
GFR SERPL CREATININE-BSD FRML MDRD: >60 ML/MIN/1.73M2
GLUCOSE SERPL-MCNC: 76 MG/DL (ref 70–99)
GLUCOSE SERPL-MCNC: 86 MG/DL (ref 70–99)
HCT VFR BLD AUTO: 38.9 % (ref 41–53)
HGB BLD-MCNC: 13.1 G/DL (ref 13.5–17.5)
LYMPHOCYTES NFR BLD: 0.5 K/UL (ref 1–4.8)
LYMPHOCYTES RELATIVE PERCENT: 7 % (ref 24–44)
MAGNESIUM SERPL-MCNC: 2 MG/DL (ref 1.6–2.6)
MAGNESIUM SERPL-MCNC: 2.1 MG/DL (ref 1.6–2.6)
MCH RBC QN AUTO: 31 PG (ref 26–34)
MCHC RBC AUTO-ENTMCNC: 33.6 G/DL (ref 31–37)
MCV RBC AUTO: 92.2 FL (ref 80–100)
MONOCYTES NFR BLD: 0.1 K/UL (ref 0.1–1.2)
MONOCYTES NFR BLD: 2 % (ref 2–11)
NEUTROPHILS NFR BLD: 91 % (ref 36–66)
NEUTS SEG NFR BLD: 7.1 K/UL (ref 1.8–7.7)
PLATELET # BLD AUTO: 154 K/UL (ref 140–450)
PMV BLD AUTO: 9.6 FL (ref 6–12)
POTASSIUM SERPL-SCNC: 3.9 MMOL/L (ref 3.7–5.3)
POTASSIUM SERPL-SCNC: 4.4 MMOL/L (ref 3.7–5.3)
RBC # BLD AUTO: 4.22 M/UL (ref 4.5–5.9)
SODIUM SERPL-SCNC: 137 MMOL/L (ref 135–144)
SODIUM SERPL-SCNC: 138 MMOL/L (ref 135–144)
WBC OTHER # BLD: 7.8 K/UL (ref 3.5–11)

## 2023-11-22 PROCEDURE — 6370000000 HC RX 637 (ALT 250 FOR IP): Performed by: SURGERY

## 2023-11-22 PROCEDURE — 97162 PT EVAL MOD COMPLEX 30 MIN: CPT

## 2023-11-22 PROCEDURE — 97535 SELF CARE MNGMENT TRAINING: CPT

## 2023-11-22 PROCEDURE — 2580000003 HC RX 258

## 2023-11-22 PROCEDURE — 93306 TTE W/DOPPLER COMPLETE: CPT | Performed by: INTERNAL MEDICINE

## 2023-11-22 PROCEDURE — 2500000003 HC RX 250 WO HCPCS

## 2023-11-22 PROCEDURE — 99223 1ST HOSP IP/OBS HIGH 75: CPT | Performed by: FAMILY MEDICINE

## 2023-11-22 PROCEDURE — 6360000002 HC RX W HCPCS

## 2023-11-22 PROCEDURE — 97116 GAIT TRAINING THERAPY: CPT

## 2023-11-22 PROCEDURE — 97166 OT EVAL MOD COMPLEX 45 MIN: CPT

## 2023-11-22 PROCEDURE — 80048 BASIC METABOLIC PNL TOTAL CA: CPT

## 2023-11-22 PROCEDURE — 99223 1ST HOSP IP/OBS HIGH 75: CPT | Performed by: SURGERY

## 2023-11-22 PROCEDURE — 6370000000 HC RX 637 (ALT 250 FOR IP)

## 2023-11-22 PROCEDURE — 36415 COLL VENOUS BLD VENIPUNCTURE: CPT

## 2023-11-22 PROCEDURE — 83735 ASSAY OF MAGNESIUM: CPT

## 2023-11-22 PROCEDURE — 1210000000 HC MED SURG R&B

## 2023-11-22 PROCEDURE — 85025 COMPLETE CBC W/AUTO DIFF WBC: CPT

## 2023-11-22 PROCEDURE — 93306 TTE W/DOPPLER COMPLETE: CPT

## 2023-11-22 RX ORDER — POTASSIUM CHLORIDE 7.45 MG/ML
10 INJECTION INTRAVENOUS PRN
Status: DISCONTINUED | OUTPATIENT
Start: 2023-11-22 | End: 2023-11-28 | Stop reason: HOSPADM

## 2023-11-22 RX ORDER — FUROSEMIDE 10 MG/ML
20 INJECTION INTRAMUSCULAR; INTRAVENOUS ONCE
Status: COMPLETED | OUTPATIENT
Start: 2023-11-22 | End: 2023-11-22

## 2023-11-22 RX ORDER — MAGNESIUM SULFATE IN WATER 40 MG/ML
2000 INJECTION, SOLUTION INTRAVENOUS PRN
Status: DISCONTINUED | OUTPATIENT
Start: 2023-11-22 | End: 2023-11-28 | Stop reason: HOSPADM

## 2023-11-22 RX ORDER — POTASSIUM CHLORIDE 20 MEQ/1
40 TABLET, EXTENDED RELEASE ORAL PRN
Status: DISCONTINUED | OUTPATIENT
Start: 2023-11-22 | End: 2023-11-28 | Stop reason: HOSPADM

## 2023-11-22 RX ORDER — FUROSEMIDE 10 MG/ML
20 INJECTION INTRAMUSCULAR; INTRAVENOUS DAILY
Status: DISCONTINUED | OUTPATIENT
Start: 2023-11-23 | End: 2023-11-28 | Stop reason: HOSPADM

## 2023-11-22 RX ORDER — METOPROLOL TARTRATE 1 MG/ML
2.5 INJECTION, SOLUTION INTRAVENOUS ONCE
Status: COMPLETED | OUTPATIENT
Start: 2023-11-22 | End: 2023-11-22

## 2023-11-22 RX ADMIN — SODIUM CHLORIDE, PRESERVATIVE FREE 10 ML: 5 INJECTION INTRAVENOUS at 09:29

## 2023-11-22 RX ADMIN — METOPROLOL TARTRATE 25 MG: 25 TABLET, FILM COATED ORAL at 20:54

## 2023-11-22 RX ADMIN — ASPIRIN 81 MG: 81 TABLET, COATED ORAL at 10:04

## 2023-11-22 RX ADMIN — ATORVASTATIN CALCIUM 10 MG: 10 TABLET, FILM COATED ORAL at 10:04

## 2023-11-22 RX ADMIN — METOPROLOL TARTRATE 25 MG: 25 TABLET, FILM COATED ORAL at 14:26

## 2023-11-22 RX ADMIN — FUROSEMIDE 20 MG: 10 INJECTION, SOLUTION INTRAMUSCULAR; INTRAVENOUS at 10:03

## 2023-11-22 RX ADMIN — CEFEPIME 2000 MG: 2 INJECTION, POWDER, FOR SOLUTION INTRAVENOUS at 21:04

## 2023-11-22 RX ADMIN — ENOXAPARIN SODIUM 30 MG: 100 INJECTION SUBCUTANEOUS at 10:05

## 2023-11-22 RX ADMIN — FINASTERIDE 5 MG: 5 TABLET, FILM COATED ORAL at 10:04

## 2023-11-22 RX ADMIN — METOPROLOL TARTRATE 2.5 MG: 5 INJECTION INTRAVENOUS at 09:25

## 2023-11-22 ASSESSMENT — ENCOUNTER SYMPTOMS
TROUBLE SWALLOWING: 1
CONSTIPATION: 0
SHORTNESS OF BREATH: 1
WHEEZING: 1
COUGH: 1
DIARRHEA: 0
ABDOMINAL PAIN: 0

## 2023-11-22 ASSESSMENT — PAIN SCALES - GENERAL
PAINLEVEL_OUTOF10: 0
PAINLEVEL_OUTOF10: 0

## 2023-11-22 NOTE — ED NOTES
Report called to inpt nurse izabela.  Sent message through [facility] to resident regarding lactic acid and ct results back     Rasheed Hutchins RN  11/21/23 2039

## 2023-11-22 NOTE — CARE COORDINATION
Case Management Assessment  Initial Evaluation    Date/Time of Evaluation: 11/22/2023 10:51 AM  Assessment Completed by: Omar Tolbert RN    If patient is discharged prior to next notation, then this note serves as note for discharge by case management. Patient Name: Aj Montero                   YOB: 1925  Diagnosis: Shortness of breath [R06.02]  Hypoxia [B84.91]  Acute systolic congestive heart failure ([address]) [I50.21]  Fatigue, unspecified type [R53.83]                   Date / Time: 11/21/2023  3:54 PM    Patient Admission Status: Inpatient   Readmission Risk (Low < 19, Mod (19-27), High > 27): Readmission Risk Score: 11.1    Current PCP: Brian Miguel MD  PCP verified by CM? Yes    Chart Reviewed: Yes      History Provided by: Patient  Patient Orientation: Alert and Oriented    Patient Cognition: Alert    Hospitalization in the last 30 days (Readmission):  No    If yes, Readmission Assessment in CM Navigator will be completed. Advance Directives:      Code Status: DNR-CCA   Patient's Primary Decision Maker is: Legal Next of Kin    Primary Decision Maker: Ashley Leon - Spouse - [phone]    Discharge Planning:    Patient lives with:   Type of Home: Apartment  Primary Care Giver: Self  Patient Support Systems include: Spouse/Significant Other   Current Financial resources: Medicare  Current community resources: None  Current services prior to admission: Durable Medical Equipment            Current DME: Shower Chair, Walker, Cane            Type of Home Care services:  None    ADLS  Prior functional level: Independent in ADLs/IADLs  Current functional level:  (Await PT/OT eval)    PT AM-PAC:   /24  OT AM-PAC: 16 /24    Family can provide assistance at DC: No  Would you like Case Management to discuss the discharge plan with any other family members/significant others, and if so, who?     Plans to Return to Present Housing: Unknown at present  Other Identified Issues/Barriers to RETURNING

## 2023-11-22 NOTE — CONSULTS
[facility]    Consult                        Today's Date: 11/22/2023  Patient Name: Smitha Braun  Date of admission: 11/21/2023  3:54 PM  Patient's age: 80 y.o., 8/14/1925  Admission Dx: Shortness of breath [R06.02]  Hypoxia [E52.24]  Acute systolic congestive heart failure (HCC) [I50.21]  Fatigue, unspecified type [R53.83]    Reason for Consult:  Cardiac evaluation    Requesting Physician: Gadiel Medel MD    CHIEF COMPLAINT: Fatigue and not feeling well    History Obtained From:  patient, electronic medical record    HISTORY OF PRESENT ILLNESS:      The patient is a 80 y. o.  with medical history of history of CABG x 4 in 1993 , dyslipidemia , primary hypertension BPH, COPD, Former smoker, History of intermittent claudication,  osteoarthritis of knee (takes tylenol, uses a cane), Prediabetes, and Spinal stenosis who presented with fatigue and shortness of breath and was found to have severe sepsis secondary to pneumonia . CT of chest reveled moderate right and small pleural effusions with borderline cardiomegaly . Cardiology was consulted CHF exacerbation. Patient is poor historian and does not recall home was his cardiologist in the past, but has been followed up with [facility] cardiology seen for 6-month ago for nonsustained VT follow-up from [facility]  Past Medical History:   has a past medical history of BPH (benign prostatic hyperplasia), Cataracts, bilateral, COPD (chronic obstructive pulmonary disease) ([address]), Former smoker, Hearing loss, History of intermittent claudication, Hyperlipidemia, Intestinal infection due to Clostridium difficile, OA (osteoarthritis) of knee, Pneumonia, Prediabetes, and Spinal stenosis. Past Surgical History:   has a past surgical history that includes Coronary artery bypass graft (1989); Eye surgery (1991); knee surgery (Bilateral); and Tonsillectomy (1931).      Home Medications:    Prior to Admission medications    Medication Sig Start Date

## 2023-11-22 NOTE — CONSULTS
[address] [facility] PULMONARY, CRITICAL CARE & SLEEP  Eloy Grana, MD Verlean Mantis, MD Lenise Goodpasture MD Americo Hang MD Altamese Ares MD  [facility] APRN  Roger Sanchez APRN   CONSULT/H&P NOTE      Date of Admission: 11/21/2023  3:54 PM    Chief complaint: Altered mental status, acute respiratory distress, shortness of breath    Referring Physician: Stephanie Duncan MD  PCP: Agiular Burns MD     History of Present Illness: Dipti Ayon is a 80 y.o. male with past medical history of COPD (former smoker), CAD, and BPH who presents with fatigue and shortness of breath. Patient is accompanied by his wife. Patient and his wife both live at [facility] living [place]. Patient was brought to the emergency department yesterday after he began experiencing shortness of breath, lightheadedness, dizziness, fatigue, difficulty ambulating, and trouble breathing. ED course: Temp: 95.9, O2 sat: 87%, otherwise vitals were within normal limits. Initial work-up in the ED included CBC, CMP, BNP, CRP, ESR, TSH, troponins, D-dimer, lactic acid, UA with culture, and blood cultures x2. Labs were notable for proBNP: 10,700, troponin: 35 at highest,  CRP: 225.5,  BUN: 30,  Cr: 1.5,  GFR: 42, TSH: 0.79, D-dimer elevated at 4.03, and lactic acid: 2.8 (repeat 1.8, 1.9). Otherwise, labs were within normal limits. CXR showed chronic bilateral basilar infiltrates consistent with prior exam.  CT chest showed no evidence of PE, moderate right and small left pleural effusions, and bilateral lower lobe and right middle lobe airspace opacities, right greater than left, also notable for mucous plugging. These findings were nonspecific, however pneumonia is favored. Patient per the patient's chart, he has a history of COPD. He was a former smoker who quit 60 years ago. Given his history, COPD is highly unlikely. No PFTs or spirometry have been noted in the chart.   Patient had an ED visit back in May which showed

## 2023-11-23 PROBLEM — I49.3 PVC'S (PREMATURE VENTRICULAR CONTRACTIONS): Status: ACTIVE | Noted: 2023-11-23

## 2023-11-23 PROBLEM — I47.29 NSVT (NONSUSTAINED VENTRICULAR TACHYCARDIA) (HCC): Status: ACTIVE | Noted: 2023-11-23

## 2023-11-23 PROBLEM — I50.21 ACUTE SYSTOLIC CONGESTIVE HEART FAILURE (HCC): Status: ACTIVE | Noted: 2023-11-23

## 2023-11-23 LAB
ANION GAP SERPL CALCULATED.3IONS-SCNC: 11 MMOL/L (ref 9–17)
BASOPHILS # BLD: 0 K/UL (ref 0–0.2)
BASOPHILS NFR BLD: 0 % (ref 0–2)
BNP SERPL-MCNC: 8546 PG/ML
BUN SERPL-MCNC: 35 MG/DL (ref 8–23)
CALCIUM SERPL-MCNC: 8.5 MG/DL (ref 8.6–10.4)
CHLORIDE SERPL-SCNC: 107 MMOL/L (ref 98–107)
CO2 SERPL-SCNC: 23 MMOL/L (ref 20–31)
CREAT SERPL-MCNC: 0.9 MG/DL (ref 0.7–1.2)
EOSINOPHIL # BLD: 0 K/UL (ref 0–0.4)
EOSINOPHILS RELATIVE PERCENT: 0 % (ref 1–4)
ERYTHROCYTE [DISTWIDTH] IN BLOOD BY AUTOMATED COUNT: 16.2 % (ref 12.5–15.4)
GFR SERPL CREATININE-BSD FRML MDRD: >60 ML/MIN/1.73M2
GLUCOSE SERPL-MCNC: 85 MG/DL (ref 70–99)
HCT VFR BLD AUTO: 40.5 % (ref 41–53)
HGB BLD-MCNC: 13.4 G/DL (ref 13.5–17.5)
LYMPHOCYTES NFR BLD: 0.7 K/UL (ref 1–4.8)
LYMPHOCYTES RELATIVE PERCENT: 8 % (ref 24–44)
MCH RBC QN AUTO: 30.8 PG (ref 26–34)
MCHC RBC AUTO-ENTMCNC: 33.2 G/DL (ref 31–37)
MCV RBC AUTO: 92.9 FL (ref 80–100)
MONOCYTES NFR BLD: 0.3 K/UL (ref 0.1–1.2)
MONOCYTES NFR BLD: 3 % (ref 2–11)
NEUTROPHILS NFR BLD: 89 % (ref 36–66)
NEUTS SEG NFR BLD: 7.4 K/UL (ref 1.8–7.7)
PLATELET # BLD AUTO: 153 K/UL (ref 140–450)
PMV BLD AUTO: 10.2 FL (ref 6–12)
POTASSIUM SERPL-SCNC: 4 MMOL/L (ref 3.7–5.3)
RBC # BLD AUTO: 4.36 M/UL (ref 4.5–5.9)
SODIUM SERPL-SCNC: 141 MMOL/L (ref 135–144)
WBC OTHER # BLD: 8.3 K/UL (ref 3.5–11)

## 2023-11-23 PROCEDURE — 6360000002 HC RX W HCPCS

## 2023-11-23 PROCEDURE — 6370000000 HC RX 637 (ALT 250 FOR IP): Performed by: INTERNAL MEDICINE

## 2023-11-23 PROCEDURE — 6370000000 HC RX 637 (ALT 250 FOR IP)

## 2023-11-23 PROCEDURE — 80048 BASIC METABOLIC PNL TOTAL CA: CPT

## 2023-11-23 PROCEDURE — 99232 SBSQ HOSP IP/OBS MODERATE 35: CPT | Performed by: FAMILY MEDICINE

## 2023-11-23 PROCEDURE — 83880 ASSAY OF NATRIURETIC PEPTIDE: CPT

## 2023-11-23 PROCEDURE — 1210000000 HC MED SURG R&B

## 2023-11-23 PROCEDURE — 2580000003 HC RX 258

## 2023-11-23 PROCEDURE — 85025 COMPLETE CBC W/AUTO DIFF WBC: CPT

## 2023-11-23 PROCEDURE — 97535 SELF CARE MNGMENT TRAINING: CPT

## 2023-11-23 PROCEDURE — 6370000000 HC RX 637 (ALT 250 FOR IP): Performed by: SURGERY

## 2023-11-23 PROCEDURE — 36415 COLL VENOUS BLD VENIPUNCTURE: CPT

## 2023-11-23 PROCEDURE — 92610 EVALUATE SWALLOWING FUNCTION: CPT

## 2023-11-23 PROCEDURE — 6360000002 HC RX W HCPCS: Performed by: INTERNAL MEDICINE

## 2023-11-23 PROCEDURE — 99223 1ST HOSP IP/OBS HIGH 75: CPT | Performed by: INTERNAL MEDICINE

## 2023-11-23 RX ORDER — AMIODARONE HYDROCHLORIDE 200 MG/1
200 TABLET ORAL 2 TIMES DAILY
Status: DISCONTINUED | OUTPATIENT
Start: 2023-11-23 | End: 2023-11-28 | Stop reason: HOSPADM

## 2023-11-23 RX ORDER — FUROSEMIDE 10 MG/ML
20 INJECTION INTRAMUSCULAR; INTRAVENOUS ONCE
Status: COMPLETED | OUTPATIENT
Start: 2023-11-23 | End: 2023-11-23

## 2023-11-23 RX ORDER — GUAIFENESIN 600 MG/1
600 TABLET, EXTENDED RELEASE ORAL 2 TIMES DAILY
Status: DISCONTINUED | OUTPATIENT
Start: 2023-11-23 | End: 2023-11-28 | Stop reason: HOSPADM

## 2023-11-23 RX ADMIN — ASPIRIN 81 MG: 81 TABLET, COATED ORAL at 09:58

## 2023-11-23 RX ADMIN — ENOXAPARIN SODIUM 30 MG: 100 INJECTION SUBCUTANEOUS at 09:58

## 2023-11-23 RX ADMIN — ATORVASTATIN CALCIUM 10 MG: 10 TABLET, FILM COATED ORAL at 09:58

## 2023-11-23 RX ADMIN — GUAIFENESIN 600 MG: 600 TABLET, EXTENDED RELEASE ORAL at 20:31

## 2023-11-23 RX ADMIN — METOPROLOL TARTRATE 25 MG: 25 TABLET, FILM COATED ORAL at 09:58

## 2023-11-23 RX ADMIN — FUROSEMIDE 20 MG: 10 INJECTION, SOLUTION INTRAMUSCULAR; INTRAVENOUS at 16:56

## 2023-11-23 RX ADMIN — SODIUM CHLORIDE, PRESERVATIVE FREE 10 ML: 5 INJECTION INTRAVENOUS at 09:58

## 2023-11-23 RX ADMIN — CEFEPIME 2000 MG: 2 INJECTION, POWDER, FOR SOLUTION INTRAVENOUS at 20:42

## 2023-11-23 RX ADMIN — GUAIFENESIN 600 MG: 600 TABLET, EXTENDED RELEASE ORAL at 09:58

## 2023-11-23 RX ADMIN — FUROSEMIDE 20 MG: 10 INJECTION, SOLUTION INTRAMUSCULAR; INTRAVENOUS at 09:57

## 2023-11-23 RX ADMIN — AMIODARONE HYDROCHLORIDE 200 MG: 200 TABLET ORAL at 18:29

## 2023-11-23 RX ADMIN — SODIUM CHLORIDE, PRESERVATIVE FREE 10 ML: 5 INJECTION INTRAVENOUS at 20:32

## 2023-11-23 RX ADMIN — METOPROLOL TARTRATE 25 MG: 25 TABLET, FILM COATED ORAL at 20:31

## 2023-11-23 RX ADMIN — FINASTERIDE 5 MG: 5 TABLET, FILM COATED ORAL at 09:58

## 2023-11-23 ASSESSMENT — PAIN SCALES - GENERAL
PAINLEVEL_OUTOF10: 0
PAINLEVEL_OUTOF10: 5

## 2023-11-23 ASSESSMENT — PAIN DESCRIPTION - PAIN TYPE: TYPE: ACUTE PAIN

## 2023-11-23 ASSESSMENT — PAIN DESCRIPTION - LOCATION: LOCATION: BUTTOCKS

## 2023-11-24 ENCOUNTER — APPOINTMENT (OUTPATIENT)
Dept: GENERAL RADIOLOGY | Age: 88
DRG: 871 | End: 2023-11-24
Payer: COMMERCIAL

## 2023-11-24 PROBLEM — I50.23 ACUTE ON CHRONIC SYSTOLIC CONGESTIVE HEART FAILURE (HCC): Status: ACTIVE | Noted: 2023-11-23

## 2023-11-24 PROBLEM — I50.20 HEART FAILURE WITH REDUCED EJECTION FRACTION (HCC): Status: RESOLVED | Noted: 2023-11-24 | Resolved: 2023-11-24

## 2023-11-24 PROBLEM — J18.9 SEPSIS DUE TO PNEUMONIA (HCC): Status: ACTIVE | Noted: 2023-11-21

## 2023-11-24 PROBLEM — J44.1 ACUTE EXACERBATION OF CHRONIC OBSTRUCTIVE PULMONARY DISEASE (COPD) (HCC): Status: RESOLVED | Noted: 2023-05-03 | Resolved: 2023-11-24

## 2023-11-24 PROBLEM — I50.9 ACUTE EXACERBATION OF CHF (CONGESTIVE HEART FAILURE) (HCC): Status: RESOLVED | Noted: 2023-11-21 | Resolved: 2023-11-24

## 2023-11-24 PROBLEM — Z91.89 AT RISK FOR FLUID VOLUME OVERLOAD: Status: RESOLVED | Noted: 2023-11-22 | Resolved: 2023-11-24

## 2023-11-24 PROBLEM — R00.0 TACHYCARDIA: Status: RESOLVED | Noted: 2023-11-22 | Resolved: 2023-11-24

## 2023-11-24 PROBLEM — I50.20 HEART FAILURE WITH REDUCED EJECTION FRACTION (HCC): Status: ACTIVE | Noted: 2023-11-24

## 2023-11-24 PROBLEM — I13.0 HYPERTENSIVE HEART AND CHRONIC KIDNEY DISEASE WITH HEART FAILURE (HCC): Status: ACTIVE | Noted: 2023-05-24

## 2023-11-24 PROBLEM — I50.23 HYPERTENSIVE HEART DISEASE WITH ACUTE ON CHRONIC SYSTOLIC CONGESTIVE HEART FAILURE (HCC): Status: ACTIVE | Noted: 2023-05-24

## 2023-11-24 PROBLEM — I50.22 CHRONIC SYSTOLIC HEART FAILURE (HCC): Status: RESOLVED | Noted: 2023-05-22 | Resolved: 2023-11-24

## 2023-11-24 PROBLEM — I25.810 CORONARY ARTERY DISEASE INVOLVING CORONARY BYPASS GRAFT OF NATIVE HEART: Status: ACTIVE | Noted: 2023-02-26

## 2023-11-24 LAB
ANION GAP SERPL CALCULATED.3IONS-SCNC: 5 MMOL/L (ref 9–17)
BASOPHILS # BLD: 0 K/UL (ref 0–0.2)
BASOPHILS NFR BLD: 0 % (ref 0–2)
BNP SERPL-MCNC: 6373 PG/ML
BUN SERPL-MCNC: 36 MG/DL (ref 8–23)
CALCIUM SERPL-MCNC: 8.4 MG/DL (ref 8.6–10.4)
CHLORIDE SERPL-SCNC: 106 MMOL/L (ref 98–107)
CO2 SERPL-SCNC: 29 MMOL/L (ref 20–31)
CREAT SERPL-MCNC: 1 MG/DL (ref 0.7–1.2)
EOSINOPHIL # BLD: 0.16 K/UL (ref 0–0.4)
EOSINOPHILS RELATIVE PERCENT: 2 % (ref 1–4)
ERYTHROCYTE [DISTWIDTH] IN BLOOD BY AUTOMATED COUNT: 16.3 % (ref 12.5–15.4)
GFR SERPL CREATININE-BSD FRML MDRD: >60 ML/MIN/1.73M2
GLUCOSE SERPL-MCNC: 109 MG/DL (ref 70–99)
HCT VFR BLD AUTO: 41.8 % (ref 41–53)
HGB BLD-MCNC: 13.8 G/DL (ref 13.5–17.5)
LYMPHOCYTES NFR BLD: 0.87 K/UL (ref 1–4.8)
LYMPHOCYTES RELATIVE PERCENT: 11 % (ref 24–44)
MCH RBC QN AUTO: 30.7 PG (ref 26–34)
MCHC RBC AUTO-ENTMCNC: 33.2 G/DL (ref 31–37)
MCV RBC AUTO: 92.5 FL (ref 80–100)
MONOCYTES NFR BLD: 0.55 K/UL (ref 0.1–0.8)
MONOCYTES NFR BLD: 7 % (ref 1–7)
MORPHOLOGY: ABNORMAL
MORPHOLOGY: ABNORMAL
NEUTROPHILS NFR BLD: 80 % (ref 36–66)
NEUTS SEG NFR BLD: 6.32 K/UL (ref 1.8–7.7)
PLATELET # BLD AUTO: 153 K/UL (ref 140–450)
PMV BLD AUTO: 11 FL (ref 6–12)
POTASSIUM SERPL-SCNC: 3.8 MMOL/L (ref 3.7–5.3)
RBC # BLD AUTO: 4.51 M/UL (ref 4.5–5.9)
SODIUM SERPL-SCNC: 140 MMOL/L (ref 135–144)
WBC OTHER # BLD: 7.9 K/UL (ref 3.5–11)

## 2023-11-24 PROCEDURE — 6360000002 HC RX W HCPCS

## 2023-11-24 PROCEDURE — 6370000000 HC RX 637 (ALT 250 FOR IP)

## 2023-11-24 PROCEDURE — 2580000003 HC RX 258

## 2023-11-24 PROCEDURE — 83880 ASSAY OF NATRIURETIC PEPTIDE: CPT

## 2023-11-24 PROCEDURE — 97110 THERAPEUTIC EXERCISES: CPT

## 2023-11-24 PROCEDURE — 99233 SBSQ HOSP IP/OBS HIGH 50: CPT | Performed by: FAMILY MEDICINE

## 2023-11-24 PROCEDURE — 80048 BASIC METABOLIC PNL TOTAL CA: CPT

## 2023-11-24 PROCEDURE — 1210000000 HC MED SURG R&B

## 2023-11-24 PROCEDURE — 6370000000 HC RX 637 (ALT 250 FOR IP): Performed by: SURGERY

## 2023-11-24 PROCEDURE — 85025 COMPLETE CBC W/AUTO DIFF WBC: CPT

## 2023-11-24 PROCEDURE — 92611 MOTION FLUOROSCOPY/SWALLOW: CPT

## 2023-11-24 PROCEDURE — 74230 X-RAY XM SWLNG FUNCJ C+: CPT

## 2023-11-24 PROCEDURE — 97535 SELF CARE MNGMENT TRAINING: CPT

## 2023-11-24 PROCEDURE — 71045 X-RAY EXAM CHEST 1 VIEW: CPT

## 2023-11-24 PROCEDURE — 36415 COLL VENOUS BLD VENIPUNCTURE: CPT

## 2023-11-24 PROCEDURE — 97116 GAIT TRAINING THERAPY: CPT

## 2023-11-24 PROCEDURE — 6370000000 HC RX 637 (ALT 250 FOR IP): Performed by: INTERNAL MEDICINE

## 2023-11-24 RX ADMIN — METOPROLOL TARTRATE 25 MG: 25 TABLET, FILM COATED ORAL at 22:11

## 2023-11-24 RX ADMIN — FUROSEMIDE 20 MG: 10 INJECTION, SOLUTION INTRAMUSCULAR; INTRAVENOUS at 08:40

## 2023-11-24 RX ADMIN — ATORVASTATIN CALCIUM 10 MG: 10 TABLET, FILM COATED ORAL at 08:40

## 2023-11-24 RX ADMIN — SODIUM CHLORIDE, PRESERVATIVE FREE 10 ML: 5 INJECTION INTRAVENOUS at 08:40

## 2023-11-24 RX ADMIN — CEFEPIME 2000 MG: 2 INJECTION, POWDER, FOR SOLUTION INTRAVENOUS at 22:14

## 2023-11-24 RX ADMIN — FINASTERIDE 5 MG: 5 TABLET, FILM COATED ORAL at 08:40

## 2023-11-24 RX ADMIN — GUAIFENESIN 600 MG: 600 TABLET, EXTENDED RELEASE ORAL at 08:40

## 2023-11-24 RX ADMIN — ENOXAPARIN SODIUM 30 MG: 100 INJECTION SUBCUTANEOUS at 08:40

## 2023-11-24 RX ADMIN — GUAIFENESIN 600 MG: 600 TABLET, EXTENDED RELEASE ORAL at 22:11

## 2023-11-24 RX ADMIN — AMIODARONE HYDROCHLORIDE 200 MG: 200 TABLET ORAL at 22:11

## 2023-11-24 RX ADMIN — AMIODARONE HYDROCHLORIDE 200 MG: 200 TABLET ORAL at 08:39

## 2023-11-24 RX ADMIN — METOPROLOL TARTRATE 25 MG: 25 TABLET, FILM COATED ORAL at 08:40

## 2023-11-24 RX ADMIN — SODIUM CHLORIDE, PRESERVATIVE FREE 10 ML: 5 INJECTION INTRAVENOUS at 22:12

## 2023-11-24 RX ADMIN — ASPIRIN 81 MG: 81 TABLET, COATED ORAL at 08:39

## 2023-11-24 ASSESSMENT — PAIN SCALES - GENERAL
PAINLEVEL_OUTOF10: 0
PAINLEVEL_OUTOF10: 0

## 2023-11-24 NOTE — PROCEDURES
INSTRUMENTAL SWALLOW REPORT  MODIFIED BARIUM SWALLOW    NAME: Patti Leija   : 1925  MRN: [id number]       Date of Eval: 2023        Radiologist: Dr. Vasyl Isidro    Past Medical History:  has a past medical history of BPH (benign prostatic hyperplasia), Cataracts, bilateral, COPD (chronic obstructive pulmonary disease) ([address]), Former smoker, Hearing loss, History of intermittent claudication, Hyperlipidemia, Intestinal infection due to Clostridium difficile, OA (osteoarthritis) of knee, Pneumonia, Prediabetes, and Spinal stenosis. Past Surgical History:  has a past surgical history that includes Coronary artery bypass graft (); Eye surgery (); knee surgery (Bilateral); and Tonsillectomy (). Type of Study: Initial MBS    Patient Complaints/Reason for Referral:  Patti Leija was referred for a MBS to assess the efficiency of his swallow function, assess for aspiration, and to make recommendations regarding safe dietary consistencies, effective compensatory strategies, and safe eating environment. Behavior/Cognition/Vision/Hearing:  Behavior/Cognition: Alert; Cooperative  Hearing: Exceptions to [facility]    Impressions:  No penetration, no aspiration with puree. Moderate vallecula and pyriform residual noted. Decreased but did not clear with double swallow. No penetration, no aspiration with soft solid. Mod-max vallecula residual that pt. Was unable to decrease or clear with subsequent swallows. No penetration, no aspiration with honey thick liquid. Min vallecula and pyriform residual noted. + penetration, no aspiration with nectar thick liquid. Mod-max vallecula and pyriform residual noted. Decreased with double swallow. + penetration, + aspiration with latent cough with thin liquid. Mod-max vallecula and pyriform residual noted. Residual noted in laryngeal vestibule. Premature spill noted with puree and soft solid. Moderate extended mastication with soft solids.   ST to recommend

## 2023-11-24 NOTE — CARE COORDINATION
CM gave patient [facility] list for review. Patient still requiring 3L NC. CM called and spoke with Eryn Boothe from [facility] and confirmed that they accept patient's insurance for home oxygen. Patient will need home O2 eval prior to discharging back to independent living. Await [facility] choice.

## 2023-11-25 PROBLEM — G93.41 ACUTE METABOLIC ENCEPHALOPATHY: Status: RESOLVED | Noted: 2023-11-21 | Resolved: 2023-11-25

## 2023-11-25 LAB
ANION GAP SERPL CALCULATED.3IONS-SCNC: 8 MMOL/L (ref 9–17)
BASOPHILS # BLD: 0 K/UL (ref 0–0.2)
BASOPHILS NFR BLD: 0 % (ref 0–2)
BNP SERPL-MCNC: 5975 PG/ML
BUN SERPL-MCNC: 30 MG/DL (ref 8–23)
CALCIUM SERPL-MCNC: 8.3 MG/DL (ref 8.6–10.4)
CHLORIDE SERPL-SCNC: 105 MMOL/L (ref 98–107)
CO2 SERPL-SCNC: 28 MMOL/L (ref 20–31)
CREAT SERPL-MCNC: 0.8 MG/DL (ref 0.7–1.2)
EOSINOPHIL # BLD: 0.1 K/UL (ref 0–0.4)
EOSINOPHILS RELATIVE PERCENT: 1 % (ref 1–4)
ERYTHROCYTE [DISTWIDTH] IN BLOOD BY AUTOMATED COUNT: 16 % (ref 12.5–15.4)
GFR SERPL CREATININE-BSD FRML MDRD: >60 ML/MIN/1.73M2
GLUCOSE SERPL-MCNC: 103 MG/DL (ref 70–99)
HCT VFR BLD AUTO: 39.8 % (ref 41–53)
HGB BLD-MCNC: 13.1 G/DL (ref 13.5–17.5)
LYMPHOCYTES NFR BLD: 1.6 K/UL (ref 1–4.8)
LYMPHOCYTES RELATIVE PERCENT: 23 % (ref 24–44)
MAGNESIUM SERPL-MCNC: 2 MG/DL (ref 1.6–2.6)
MCH RBC QN AUTO: 30.4 PG (ref 26–34)
MCHC RBC AUTO-ENTMCNC: 32.9 G/DL (ref 31–37)
MCV RBC AUTO: 92.4 FL (ref 80–100)
MONOCYTES NFR BLD: 0.9 K/UL (ref 0.1–1.2)
MONOCYTES NFR BLD: 14 % (ref 2–11)
NEUTROPHILS NFR BLD: 62 % (ref 36–66)
NEUTS SEG NFR BLD: 4.1 K/UL (ref 1.8–7.7)
PLATELET # BLD AUTO: 153 K/UL (ref 140–450)
PMV BLD AUTO: 9.9 FL (ref 6–12)
POTASSIUM SERPL-SCNC: 3.5 MMOL/L (ref 3.7–5.3)
RBC # BLD AUTO: 4.3 M/UL (ref 4.5–5.9)
SODIUM SERPL-SCNC: 141 MMOL/L (ref 135–144)
WBC OTHER # BLD: 6.7 K/UL (ref 3.5–11)

## 2023-11-25 PROCEDURE — 83880 ASSAY OF NATRIURETIC PEPTIDE: CPT

## 2023-11-25 PROCEDURE — 36415 COLL VENOUS BLD VENIPUNCTURE: CPT

## 2023-11-25 PROCEDURE — 92526 ORAL FUNCTION THERAPY: CPT

## 2023-11-25 PROCEDURE — 6370000000 HC RX 637 (ALT 250 FOR IP): Performed by: INTERNAL MEDICINE

## 2023-11-25 PROCEDURE — 80048 BASIC METABOLIC PNL TOTAL CA: CPT

## 2023-11-25 PROCEDURE — 6370000000 HC RX 637 (ALT 250 FOR IP)

## 2023-11-25 PROCEDURE — 2580000003 HC RX 258

## 2023-11-25 PROCEDURE — 85025 COMPLETE CBC W/AUTO DIFF WBC: CPT

## 2023-11-25 PROCEDURE — 6370000000 HC RX 637 (ALT 250 FOR IP): Performed by: SURGERY

## 2023-11-25 PROCEDURE — 83735 ASSAY OF MAGNESIUM: CPT

## 2023-11-25 PROCEDURE — 6360000002 HC RX W HCPCS

## 2023-11-25 PROCEDURE — 1210000000 HC MED SURG R&B

## 2023-11-25 RX ORDER — POTASSIUM CHLORIDE 20 MEQ/1
20 TABLET, EXTENDED RELEASE ORAL
Status: DISCONTINUED | OUTPATIENT
Start: 2023-11-25 | End: 2023-11-28 | Stop reason: HOSPADM

## 2023-11-25 RX ADMIN — FINASTERIDE 5 MG: 5 TABLET, FILM COATED ORAL at 10:50

## 2023-11-25 RX ADMIN — AMIODARONE HYDROCHLORIDE 200 MG: 200 TABLET ORAL at 21:23

## 2023-11-25 RX ADMIN — SODIUM CHLORIDE, PRESERVATIVE FREE 10 ML: 5 INJECTION INTRAVENOUS at 21:26

## 2023-11-25 RX ADMIN — AMIODARONE HYDROCHLORIDE 200 MG: 200 TABLET ORAL at 10:50

## 2023-11-25 RX ADMIN — ASPIRIN 81 MG: 81 TABLET, COATED ORAL at 10:49

## 2023-11-25 RX ADMIN — FUROSEMIDE 20 MG: 10 INJECTION, SOLUTION INTRAMUSCULAR; INTRAVENOUS at 10:50

## 2023-11-25 RX ADMIN — SODIUM CHLORIDE, PRESERVATIVE FREE 10 ML: 5 INJECTION INTRAVENOUS at 10:51

## 2023-11-25 RX ADMIN — POTASSIUM CHLORIDE 20 MEQ: 1500 TABLET, EXTENDED RELEASE ORAL at 11:24

## 2023-11-25 RX ADMIN — GUAIFENESIN 600 MG: 600 TABLET, EXTENDED RELEASE ORAL at 21:23

## 2023-11-25 RX ADMIN — ENOXAPARIN SODIUM 30 MG: 100 INJECTION SUBCUTANEOUS at 10:50

## 2023-11-25 RX ADMIN — CEFEPIME 2000 MG: 2 INJECTION, POWDER, FOR SOLUTION INTRAVENOUS at 21:28

## 2023-11-25 RX ADMIN — METOPROLOL TARTRATE 25 MG: 25 TABLET, FILM COATED ORAL at 10:50

## 2023-11-25 RX ADMIN — METOPROLOL TARTRATE 25 MG: 25 TABLET, FILM COATED ORAL at 21:23

## 2023-11-25 RX ADMIN — GUAIFENESIN 600 MG: 600 TABLET, EXTENDED RELEASE ORAL at 10:49

## 2023-11-25 RX ADMIN — ATORVASTATIN CALCIUM 10 MG: 10 TABLET, FILM COATED ORAL at 10:49

## 2023-11-25 ASSESSMENT — PAIN SCALES - GENERAL: PAINLEVEL_OUTOF10: 0

## 2023-11-26 ENCOUNTER — APPOINTMENT (OUTPATIENT)
Dept: GENERAL RADIOLOGY | Age: 88
DRG: 871 | End: 2023-11-26
Payer: COMMERCIAL

## 2023-11-26 LAB
ANION GAP SERPL CALCULATED.3IONS-SCNC: 7 MMOL/L (ref 9–17)
BASOPHILS # BLD: 0 K/UL (ref 0–0.2)
BASOPHILS NFR BLD: 0 % (ref 0–2)
BUN SERPL-MCNC: 24 MG/DL (ref 8–23)
CALCIUM SERPL-MCNC: 8.2 MG/DL (ref 8.6–10.4)
CHLORIDE SERPL-SCNC: 103 MMOL/L (ref 98–107)
CO2 SERPL-SCNC: 31 MMOL/L (ref 20–31)
CREAT SERPL-MCNC: 0.8 MG/DL (ref 0.7–1.2)
EOSINOPHIL # BLD: 0.1 K/UL (ref 0–0.4)
EOSINOPHILS RELATIVE PERCENT: 1 % (ref 1–4)
ERYTHROCYTE [DISTWIDTH] IN BLOOD BY AUTOMATED COUNT: 16 % (ref 12.5–15.4)
GFR SERPL CREATININE-BSD FRML MDRD: >60 ML/MIN/1.73M2
GLUCOSE SERPL-MCNC: 94 MG/DL (ref 70–99)
HCT VFR BLD AUTO: 37 % (ref 41–53)
HGB BLD-MCNC: 12.5 G/DL (ref 13.5–17.5)
LYMPHOCYTES NFR BLD: 1.9 K/UL (ref 1–4.8)
LYMPHOCYTES RELATIVE PERCENT: 25 % (ref 24–44)
MCH RBC QN AUTO: 31 PG (ref 26–34)
MCHC RBC AUTO-ENTMCNC: 33.8 G/DL (ref 31–37)
MCV RBC AUTO: 91.7 FL (ref 80–100)
MICROORGANISM SPEC CULT: NORMAL
MICROORGANISM SPEC CULT: NORMAL
MONOCYTES NFR BLD: 0.9 K/UL (ref 0.1–1.2)
MONOCYTES NFR BLD: 12 % (ref 2–11)
NEUTROPHILS NFR BLD: 62 % (ref 36–66)
NEUTS SEG NFR BLD: 4.8 K/UL (ref 1.8–7.7)
PLATELET # BLD AUTO: 169 K/UL (ref 140–450)
PMV BLD AUTO: 10.1 FL (ref 6–12)
POTASSIUM SERPL-SCNC: 3.7 MMOL/L (ref 3.7–5.3)
RBC # BLD AUTO: 4.03 M/UL (ref 4.5–5.9)
SERVICE CMNT-IMP: NORMAL
SERVICE CMNT-IMP: NORMAL
SODIUM SERPL-SCNC: 141 MMOL/L (ref 135–144)
SPECIMEN DESCRIPTION: NORMAL
SPECIMEN DESCRIPTION: NORMAL
WBC OTHER # BLD: 7.7 K/UL (ref 3.5–11)

## 2023-11-26 PROCEDURE — 2580000003 HC RX 258

## 2023-11-26 PROCEDURE — 36415 COLL VENOUS BLD VENIPUNCTURE: CPT

## 2023-11-26 PROCEDURE — 6370000000 HC RX 637 (ALT 250 FOR IP)

## 2023-11-26 PROCEDURE — 71046 X-RAY EXAM CHEST 2 VIEWS: CPT

## 2023-11-26 PROCEDURE — 6360000002 HC RX W HCPCS

## 2023-11-26 PROCEDURE — 6370000000 HC RX 637 (ALT 250 FOR IP): Performed by: SURGERY

## 2023-11-26 PROCEDURE — 6370000000 HC RX 637 (ALT 250 FOR IP): Performed by: INTERNAL MEDICINE

## 2023-11-26 PROCEDURE — 85025 COMPLETE CBC W/AUTO DIFF WBC: CPT

## 2023-11-26 PROCEDURE — 80048 BASIC METABOLIC PNL TOTAL CA: CPT

## 2023-11-26 PROCEDURE — 1210000000 HC MED SURG R&B

## 2023-11-26 PROCEDURE — 99233 SBSQ HOSP IP/OBS HIGH 50: CPT | Performed by: FAMILY MEDICINE

## 2023-11-26 RX ORDER — ENOXAPARIN SODIUM 100 MG/ML
40 INJECTION SUBCUTANEOUS DAILY
Status: DISCONTINUED | OUTPATIENT
Start: 2023-11-27 | End: 2023-11-28 | Stop reason: HOSPADM

## 2023-11-26 RX ADMIN — CEFEPIME 2000 MG: 2 INJECTION, POWDER, FOR SOLUTION INTRAVENOUS at 13:52

## 2023-11-26 RX ADMIN — ENOXAPARIN SODIUM 30 MG: 100 INJECTION SUBCUTANEOUS at 08:05

## 2023-11-26 RX ADMIN — GUAIFENESIN 600 MG: 600 TABLET, EXTENDED RELEASE ORAL at 08:05

## 2023-11-26 RX ADMIN — SODIUM CHLORIDE, PRESERVATIVE FREE 10 ML: 5 INJECTION INTRAVENOUS at 21:05

## 2023-11-26 RX ADMIN — GUAIFENESIN 600 MG: 600 TABLET, EXTENDED RELEASE ORAL at 21:06

## 2023-11-26 RX ADMIN — FINASTERIDE 5 MG: 5 TABLET, FILM COATED ORAL at 08:05

## 2023-11-26 RX ADMIN — AMIODARONE HYDROCHLORIDE 200 MG: 200 TABLET ORAL at 08:05

## 2023-11-26 RX ADMIN — METOPROLOL TARTRATE 25 MG: 25 TABLET, FILM COATED ORAL at 21:06

## 2023-11-26 RX ADMIN — SODIUM CHLORIDE, PRESERVATIVE FREE 10 ML: 5 INJECTION INTRAVENOUS at 08:05

## 2023-11-26 RX ADMIN — POTASSIUM CHLORIDE 20 MEQ: 1500 TABLET, EXTENDED RELEASE ORAL at 08:05

## 2023-11-26 RX ADMIN — ASPIRIN 81 MG: 81 TABLET, COATED ORAL at 08:05

## 2023-11-26 RX ADMIN — FUROSEMIDE 20 MG: 10 INJECTION, SOLUTION INTRAMUSCULAR; INTRAVENOUS at 08:05

## 2023-11-26 RX ADMIN — AMIODARONE HYDROCHLORIDE 200 MG: 200 TABLET ORAL at 21:06

## 2023-11-26 RX ADMIN — ATORVASTATIN CALCIUM 10 MG: 10 TABLET, FILM COATED ORAL at 08:05

## 2023-11-26 RX ADMIN — METOPROLOL TARTRATE 25 MG: 25 TABLET, FILM COATED ORAL at 08:05

## 2023-11-27 PROBLEM — J18.9 SEPSIS DUE TO PNEUMONIA (HCC): Status: RESOLVED | Noted: 2023-11-21 | Resolved: 2023-11-27

## 2023-11-27 PROBLEM — A41.9 SEPSIS DUE TO PNEUMONIA (HCC): Status: RESOLVED | Noted: 2023-11-21 | Resolved: 2023-11-27

## 2023-11-27 LAB
ANION GAP SERPL CALCULATED.3IONS-SCNC: 9 MMOL/L (ref 9–17)
BASOPHILS # BLD: 0 K/UL (ref 0–0.2)
BASOPHILS NFR BLD: 1 % (ref 0–2)
BUN SERPL-MCNC: 25 MG/DL (ref 8–23)
CALCIUM SERPL-MCNC: 8.2 MG/DL (ref 8.6–10.4)
CHLORIDE SERPL-SCNC: 102 MMOL/L (ref 98–107)
CO2 SERPL-SCNC: 28 MMOL/L (ref 20–31)
CREAT SERPL-MCNC: 0.8 MG/DL (ref 0.7–1.2)
EOSINOPHIL # BLD: 0.1 K/UL (ref 0–0.4)
EOSINOPHILS RELATIVE PERCENT: 1 % (ref 1–4)
ERYTHROCYTE [DISTWIDTH] IN BLOOD BY AUTOMATED COUNT: 15.7 % (ref 12.5–15.4)
GFR SERPL CREATININE-BSD FRML MDRD: >60 ML/MIN/1.73M2
GLUCOSE SERPL-MCNC: 102 MG/DL (ref 70–99)
HCT VFR BLD AUTO: 38 % (ref 41–53)
HGB BLD-MCNC: 12.6 G/DL (ref 13.5–17.5)
LYMPHOCYTES NFR BLD: 1.7 K/UL (ref 1–4.8)
LYMPHOCYTES RELATIVE PERCENT: 23 % (ref 24–44)
MCH RBC QN AUTO: 30.7 PG (ref 26–34)
MCHC RBC AUTO-ENTMCNC: 33.1 G/DL (ref 31–37)
MCV RBC AUTO: 92.5 FL (ref 80–100)
MONOCYTES NFR BLD: 1 K/UL (ref 0.1–1.2)
MONOCYTES NFR BLD: 14 % (ref 2–11)
NEUTROPHILS NFR BLD: 61 % (ref 36–66)
NEUTS SEG NFR BLD: 4.5 K/UL (ref 1.8–7.7)
PLATELET # BLD AUTO: 169 K/UL (ref 140–450)
PMV BLD AUTO: 10.1 FL (ref 6–12)
POTASSIUM SERPL-SCNC: 3.8 MMOL/L (ref 3.7–5.3)
RBC # BLD AUTO: 4.1 M/UL (ref 4.5–5.9)
SODIUM SERPL-SCNC: 139 MMOL/L (ref 135–144)
WBC OTHER # BLD: 7.4 K/UL (ref 3.5–11)

## 2023-11-27 PROCEDURE — 85025 COMPLETE CBC W/AUTO DIFF WBC: CPT

## 2023-11-27 PROCEDURE — 2580000003 HC RX 258

## 2023-11-27 PROCEDURE — 80048 BASIC METABOLIC PNL TOTAL CA: CPT

## 2023-11-27 PROCEDURE — 36415 COLL VENOUS BLD VENIPUNCTURE: CPT

## 2023-11-27 PROCEDURE — 6370000000 HC RX 637 (ALT 250 FOR IP): Performed by: SURGERY

## 2023-11-27 PROCEDURE — 97530 THERAPEUTIC ACTIVITIES: CPT

## 2023-11-27 PROCEDURE — 6360000002 HC RX W HCPCS

## 2023-11-27 PROCEDURE — 1210000000 HC MED SURG R&B

## 2023-11-27 PROCEDURE — 97110 THERAPEUTIC EXERCISES: CPT

## 2023-11-27 PROCEDURE — 6370000000 HC RX 637 (ALT 250 FOR IP)

## 2023-11-27 PROCEDURE — 97535 SELF CARE MNGMENT TRAINING: CPT

## 2023-11-27 PROCEDURE — 6370000000 HC RX 637 (ALT 250 FOR IP): Performed by: INTERNAL MEDICINE

## 2023-11-27 PROCEDURE — 99232 SBSQ HOSP IP/OBS MODERATE 35: CPT | Performed by: FAMILY MEDICINE

## 2023-11-27 RX ADMIN — AMIODARONE HYDROCHLORIDE 200 MG: 200 TABLET ORAL at 20:36

## 2023-11-27 RX ADMIN — GUAIFENESIN 600 MG: 600 TABLET, EXTENDED RELEASE ORAL at 09:52

## 2023-11-27 RX ADMIN — METOPROLOL TARTRATE 25 MG: 25 TABLET, FILM COATED ORAL at 09:52

## 2023-11-27 RX ADMIN — ENOXAPARIN SODIUM 40 MG: 100 INJECTION SUBCUTANEOUS at 09:51

## 2023-11-27 RX ADMIN — METOPROLOL TARTRATE 25 MG: 25 TABLET, FILM COATED ORAL at 20:36

## 2023-11-27 RX ADMIN — SODIUM CHLORIDE, PRESERVATIVE FREE 10 ML: 5 INJECTION INTRAVENOUS at 20:37

## 2023-11-27 RX ADMIN — FUROSEMIDE 20 MG: 10 INJECTION, SOLUTION INTRAMUSCULAR; INTRAVENOUS at 09:55

## 2023-11-27 RX ADMIN — ATORVASTATIN CALCIUM 10 MG: 10 TABLET, FILM COATED ORAL at 09:54

## 2023-11-27 RX ADMIN — GUAIFENESIN 600 MG: 600 TABLET, EXTENDED RELEASE ORAL at 20:36

## 2023-11-27 RX ADMIN — POTASSIUM CHLORIDE 20 MEQ: 1500 TABLET, EXTENDED RELEASE ORAL at 09:52

## 2023-11-27 RX ADMIN — CEFEPIME 2000 MG: 2 INJECTION, POWDER, FOR SOLUTION INTRAVENOUS at 13:54

## 2023-11-27 RX ADMIN — FINASTERIDE 5 MG: 5 TABLET, FILM COATED ORAL at 09:52

## 2023-11-27 RX ADMIN — AMIODARONE HYDROCHLORIDE 200 MG: 200 TABLET ORAL at 09:53

## 2023-11-27 RX ADMIN — SODIUM CHLORIDE, PRESERVATIVE FREE 10 ML: 5 INJECTION INTRAVENOUS at 10:14

## 2023-11-27 RX ADMIN — ASPIRIN 81 MG: 81 TABLET, COATED ORAL at 09:51

## 2023-11-27 RX ADMIN — CEFEPIME 2000 MG: 2 INJECTION, POWDER, FOR SOLUTION INTRAVENOUS at 01:59

## 2023-11-27 NOTE — CARE COORDINATION
Patient adamant on not going to a SNF, gave family Homecare list to review. Post Acute Facility/Agency List     Provided patient with the following list, the list includes the overall star ratings obtained from CMS per the Medicare Web site (www.Medicare.gov):     [] [address]  [] Acute Inpatient Rehabilitation Facilities  [] Skilled Nursing Facilities  [x] Home Care    Provided verbal instructions on how to utilize the QR Code to obtain additional detailed star ratings from www. Medicare. gov     offered to print and provide the detailed list:    []Accepted   [x]Declined      [id number] Referrals made to #1 [address], #2 [facility], #3 [address] and #4 [address]. [facility] and [facility] do not his insurance, [address] does and spoke with Robert Montague and they accepted him as a patient and will come to see tomorrow. Plan O2 evaluation by respiratory over night.

## 2023-11-27 NOTE — DISCHARGE SUMMARY
medications      aspirin 81 MG EC tablet  Replaced by: aspirin 81 MG chewable tablet     simvastatin 20 MG tablet  Commonly known as: ZOCOR               Where to Get Your Medications        These medications were sent to [facility] [id number], [address] [place] [address] [address]      Phone: [phone]   amiodarone 200 MG tablet  aspirin 81 MG chewable tablet  furosemide 20 MG tablet  metoprolol tartrate 25 MG tablet       Time spent on discharge is less than 30 minutes in patient examination, evaluation, counseling as well as medication reconciliation, prescriptions for required medications, discharge plan and follow up. Discharge plan was discussed with MD Chelsy Cabral MD  Family Medicine Resident, PGY-1   11/29/2023  8:56 PM       Curt Ramsey MD  Family Medicine Resident, PGY-3  11/30/2023       Thank you Dr. Yecenia Fry MD for the opportunity to be involved in this patient's care.

## 2023-11-27 NOTE — DISCHARGE INSTR - COC
Continuity of Care Form    Patient Name: Santa Malone   :  1925  MRN:  [id number]    Admit date:  2023  Discharge date:  2023    Code Status Order: DNR-CCA   Advance Directives:     Admitting Physician:  Glenda Santiago MD  PCP: Prerna Anthony MD    Discharging Nurse: Clifm Lack  [facility]/Room#: 327/327-01  Discharging Unit Phone Number: [phone]    Emergency Contact:   Extended Emergency Contact Information  Primary Emergency Contact: Ashley Leon  Home Phone: [phone]  Mobile Phone: [phone]  Relation: Spouse    Past Surgical History:  Past Surgical History:   Procedure Laterality Date    CORONARY ARTERY BYPASS GRAFT      EYE SURGERY      Cataracts    KNEE SURGERY Bilateral     TONSILLECTOMY         Immunization History:   Immunization History   Administered Date(s) Administered    COVID-19, PFIZER GRAY top, DO NOT Dilute, (age 15 y+), IM, 30 mcg/0.3 mL 2022    COVID-19, PFIZER PURPLE top, DILUTE for use, (age 15 y+), 30mcg/0.3mL 2021, 10/10/2021    Influenza, FLUAD, (age 72 y+), Adjuvanted, 0.5mL 2023    Influenza, FLUARIX, FLULAVAL, FLUZONE (age 10 mo+) AND AFLURIA, (age 1 y+), PF, 0.5mL 10/26/2022    Influenza, FLUZONE (age 72 y+), High Dose, 0.7mL 10/05/2020, 10/08/2021    Influenza, High Dose (Fluzone 65 yrs and older) 10/06/2017, 10/01/2018, 10/07/2019    Pneumococcal, PCV-13, PREVNAR 15, (age 6w+), IM, 0.5mL 2016, 2019    Zoster Live (Zostavax) 2015       Active Problems:  Patient Active Problem List   Diagnosis Code    Hyperlipidemia E78.5    Hypertensive heart and chronic kidney disease with heart failure (HCC) 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

## 2023-11-28 VITALS
DIASTOLIC BLOOD PRESSURE: 72 MMHG | TEMPERATURE: 97.3 F | SYSTOLIC BLOOD PRESSURE: 144 MMHG | WEIGHT: 115.08 LBS | OXYGEN SATURATION: 88 % | RESPIRATION RATE: 16 BRPM | BODY MASS INDEX: 18.49 KG/M2 | HEART RATE: 60 BPM | HEIGHT: 66 IN

## 2023-11-28 LAB
ANION GAP SERPL CALCULATED.3IONS-SCNC: 6 MMOL/L (ref 9–17)
BASOPHILS # BLD: 0.1 K/UL (ref 0–0.2)
BASOPHILS NFR BLD: 1 % (ref 0–2)
BUN SERPL-MCNC: 29 MG/DL (ref 8–23)
CALCIUM SERPL-MCNC: 8.2 MG/DL (ref 8.6–10.4)
CHLORIDE SERPL-SCNC: 102 MMOL/L (ref 98–107)
CO2 SERPL-SCNC: 32 MMOL/L (ref 20–31)
CREAT SERPL-MCNC: 0.8 MG/DL (ref 0.7–1.2)
EOSINOPHIL # BLD: 0.1 K/UL (ref 0–0.4)
EOSINOPHILS RELATIVE PERCENT: 1 % (ref 1–4)
ERYTHROCYTE [DISTWIDTH] IN BLOOD BY AUTOMATED COUNT: 15.8 % (ref 12.5–15.4)
GFR SERPL CREATININE-BSD FRML MDRD: >60 ML/MIN/1.73M2
GLUCOSE SERPL-MCNC: 98 MG/DL (ref 70–99)
HCT VFR BLD AUTO: 36.6 % (ref 41–53)
HGB BLD-MCNC: 12.3 G/DL (ref 13.5–17.5)
LYMPHOCYTES NFR BLD: 2 K/UL (ref 1–4.8)
LYMPHOCYTES RELATIVE PERCENT: 26 % (ref 24–44)
MCH RBC QN AUTO: 30.9 PG (ref 26–34)
MCHC RBC AUTO-ENTMCNC: 33.8 G/DL (ref 31–37)
MCV RBC AUTO: 91.6 FL (ref 80–100)
MONOCYTES NFR BLD: 0.9 K/UL (ref 0.1–1.2)
MONOCYTES NFR BLD: 12 % (ref 2–11)
NEUTROPHILS NFR BLD: 60 % (ref 36–66)
NEUTS SEG NFR BLD: 4.7 K/UL (ref 1.8–7.7)
PLATELET # BLD AUTO: 189 K/UL (ref 140–450)
PMV BLD AUTO: 10.3 FL (ref 6–12)
POTASSIUM SERPL-SCNC: 3.7 MMOL/L (ref 3.7–5.3)
RBC # BLD AUTO: 3.99 M/UL (ref 4.5–5.9)
SODIUM SERPL-SCNC: 140 MMOL/L (ref 135–144)
WBC OTHER # BLD: 7.7 K/UL (ref 3.5–11)

## 2023-11-28 PROCEDURE — 85025 COMPLETE CBC W/AUTO DIFF WBC: CPT

## 2023-11-28 PROCEDURE — 36415 COLL VENOUS BLD VENIPUNCTURE: CPT

## 2023-11-28 PROCEDURE — 97535 SELF CARE MNGMENT TRAINING: CPT

## 2023-11-28 PROCEDURE — 97116 GAIT TRAINING THERAPY: CPT

## 2023-11-28 PROCEDURE — 97110 THERAPEUTIC EXERCISES: CPT

## 2023-11-28 PROCEDURE — 6360000002 HC RX W HCPCS

## 2023-11-28 PROCEDURE — 99238 HOSP IP/OBS DSCHRG MGMT 30/<: CPT | Performed by: FAMILY MEDICINE

## 2023-11-28 PROCEDURE — 6360000002 HC RX W HCPCS: Performed by: FAMILY MEDICINE

## 2023-11-28 PROCEDURE — 80048 BASIC METABOLIC PNL TOTAL CA: CPT

## 2023-11-28 PROCEDURE — 2580000003 HC RX 258: Performed by: FAMILY MEDICINE

## 2023-11-28 PROCEDURE — 6370000000 HC RX 637 (ALT 250 FOR IP): Performed by: INTERNAL MEDICINE

## 2023-11-28 PROCEDURE — 2580000003 HC RX 258

## 2023-11-28 PROCEDURE — 6370000000 HC RX 637 (ALT 250 FOR IP)

## 2023-11-28 PROCEDURE — 92526 ORAL FUNCTION THERAPY: CPT

## 2023-11-28 PROCEDURE — 6370000000 HC RX 637 (ALT 250 FOR IP): Performed by: SURGERY

## 2023-11-28 RX ORDER — FUROSEMIDE 20 MG/1
20 TABLET ORAL DAILY
Qty: 30 TABLET | Refills: 0 | Status: SHIPPED | OUTPATIENT
Start: 2023-11-28 | End: 2023-12-28

## 2023-11-28 RX ORDER — ASPIRIN 81 MG/1
81 TABLET, CHEWABLE ORAL DAILY
Qty: 30 TABLET | Refills: 0 | Status: SHIPPED | OUTPATIENT
Start: 2023-11-28 | End: 2023-12-28

## 2023-11-28 RX ORDER — ASPIRIN 81 MG/1
81 TABLET, CHEWABLE ORAL DAILY
Status: DISCONTINUED | OUTPATIENT
Start: 2023-11-28 | End: 2023-11-28 | Stop reason: HOSPADM

## 2023-11-28 RX ORDER — AMIODARONE HYDROCHLORIDE 200 MG/1
200 TABLET ORAL 2 TIMES DAILY
Qty: 60 TABLET | Refills: 0 | Status: SHIPPED | OUTPATIENT
Start: 2023-11-28 | End: 2023-12-28

## 2023-11-28 RX ORDER — FUROSEMIDE 20 MG/1
20 TABLET ORAL DAILY
Qty: 30 TABLET | Refills: 0 | Status: SHIPPED | OUTPATIENT
Start: 2023-11-28 | End: 2023-11-28 | Stop reason: HOSPADM

## 2023-11-28 RX ORDER — FUROSEMIDE 10 MG/ML
20 INJECTION INTRAMUSCULAR; INTRAVENOUS DAILY
Qty: 60 ML | Refills: 0 | Status: SHIPPED | OUTPATIENT
Start: 2023-11-28 | End: 2023-11-28 | Stop reason: HOSPADM

## 2023-11-28 RX ORDER — ASPIRIN 81 MG/1
81 TABLET, CHEWABLE ORAL DAILY
Qty: 30 TABLET | Refills: 0 | Status: SHIPPED | OUTPATIENT
Start: 2023-11-28 | End: 2023-11-28 | Stop reason: HOSPADM

## 2023-11-28 RX ORDER — AMIODARONE HYDROCHLORIDE 200 MG/1
200 TABLET ORAL 2 TIMES DAILY
Qty: 60 TABLET | Refills: 0 | Status: SHIPPED | OUTPATIENT
Start: 2023-11-28 | End: 2023-11-28 | Stop reason: HOSPADM

## 2023-11-28 RX ADMIN — METOPROLOL TARTRATE 25 MG: 25 TABLET, FILM COATED ORAL at 10:40

## 2023-11-28 RX ADMIN — ENOXAPARIN SODIUM 40 MG: 100 INJECTION SUBCUTANEOUS at 10:42

## 2023-11-28 RX ADMIN — ATORVASTATIN CALCIUM 10 MG: 10 TABLET, FILM COATED ORAL at 10:40

## 2023-11-28 RX ADMIN — FINASTERIDE 5 MG: 5 TABLET, FILM COATED ORAL at 10:40

## 2023-11-28 RX ADMIN — AMIODARONE HYDROCHLORIDE 200 MG: 200 TABLET ORAL at 10:40

## 2023-11-28 RX ADMIN — FUROSEMIDE 20 MG: 10 INJECTION, SOLUTION INTRAMUSCULAR; INTRAVENOUS at 10:58

## 2023-11-28 RX ADMIN — ASPIRIN 81 MG: 81 TABLET, CHEWABLE ORAL at 14:28

## 2023-11-28 RX ADMIN — CEFEPIME 2000 MG: 2 INJECTION, POWDER, FOR SOLUTION INTRAVENOUS at 01:49

## 2023-11-28 RX ADMIN — SODIUM CHLORIDE, PRESERVATIVE FREE 10 ML: 5 INJECTION INTRAVENOUS at 10:59

## 2023-11-28 RX ADMIN — CEFEPIME 2000 MG: 2 INJECTION, POWDER, FOR SOLUTION INTRAVENOUS at 14:21

## 2023-11-28 RX ADMIN — SODIUM CHLORIDE 25 ML: 9 INJECTION, SOLUTION INTRAVENOUS at 14:19

## 2023-11-28 ASSESSMENT — PAIN SCALES - GENERAL
PAINLEVEL_OUTOF10: 0
PAINLEVEL_OUTOF10: 0

## 2023-11-28 NOTE — PLAN OF CARE
Nutrition Problem #1:  (mild malnutrition)  Intervention: Food and/or Nutrient Delivery: Continue Current Diet, Start Oral Nutrition Supplement  Nutritional Goal: At least 50% PO intake prior to discharge
Problem: Discharge Planning  Goal: Discharge to home or other facility with appropriate resources  11/24/2023 0038 by Tolu Will RN  Outcome: Progressing  11/23/2023 1902 by Garth Vickers RN  Outcome: Progressing  Flowsheets  Taken 11/23/2023 1600  Discharge to home or other facility with appropriate resources:   Identify barriers to discharge with patient and caregiver   Arrange for needed discharge resources and transportation as appropriate   Identify discharge learning needs (meds, wound care, etc)   Refer to discharge planning if patient needs post-hospital services based on physician order or complex needs related to functional status, cognitive ability or social support system  Taken 11/23/2023 1200  Discharge to home or other facility with appropriate resources:   Identify barriers to discharge with patient and caregiver   Arrange for needed discharge resources and transportation as appropriate   Identify discharge learning needs (meds, wound care, etc)   Refer to discharge planning if patient needs post-hospital services based on physician order or complex needs related to functional status, cognitive ability or social support system  Taken 11/23/2023 0814  Discharge to home or other facility with appropriate resources:   Identify barriers to discharge with patient and caregiver   Arrange for needed discharge resources and transportation as appropriate   Identify discharge learning needs (meds, wound care, etc)   Refer to discharge planning if patient needs post-hospital services based on physician order or complex needs related to functional status, cognitive ability or social support system     Problem: Safety - Adult  Goal: Free from fall injury  11/24/2023 0038 by Tolu Will RN  Outcome: Progressing  11/23/2023 1902 by Garth Vickers RN  Outcome: Progressing     Problem: Pain  Goal: Verbalizes/displays adequate comfort level or baseline comfort level  11/24/2023 0038 by Tolu Will
Problem: Discharge Planning  Goal: Discharge to home or other facility with appropriate resources  11/27/2023 1428 by Flory Marsh RN  Outcome: Progressing  Flowsheets (Taken 11/27/2023 1300)  Discharge to home or other facility with appropriate resources: Identify barriers to discharge with patient and caregiver  11/27/2023 0310 by Corey Arana RN  Outcome: Progressing     Problem: Safety - Adult  Goal: Free from fall injury  11/27/2023 1428 by Flory Marsh RN  Outcome: Progressing  11/27/2023 0310 by Corey Arana RN  Outcome: Progressing     Problem: Pain  Goal: Verbalizes/displays adequate comfort level or baseline comfort level  11/27/2023 1428 by Flory Marsh RN  Outcome: Progressing  11/27/2023 0310 by Corey Arana RN  Outcome: Progressing     Problem: ABCDS Injury Assessment  Goal: Absence of physical injury  Outcome: Progressing     Problem: Skin/Tissue Integrity  Goal: Absence of new skin breakdown  Description: 1. Monitor for areas of redness and/or skin breakdown  2. Assess vascular access sites hourly  3. Every 4-6 hours minimum:  Change oxygen saturation probe site  4. Every 4-6 hours:  If on nasal continuous positive airway pressure, respiratory therapy assess nares and determine need for appliance change or resting period.   11/27/2023 1428 by Flory Marsh RN  Outcome: Progressing  11/27/2023 0310 by Corey Arana RN  Outcome: Progressing     Problem: Metabolic/Fluid and Electrolytes - Adult  Goal: Electrolytes maintained within normal limits  Outcome: Progressing  Flowsheets (Taken 11/27/2023 1300)  Electrolytes maintained within normal limits: Monitor labs and assess patient for signs and symptoms of electrolyte imbalances  Goal: Hemodynamic stability and optimal renal function maintained  Outcome: Progressing  Flowsheets (Taken 11/27/2023 1300)  Hemodynamic stability and optimal renal function maintained:   Monitor intake, output and patient weight   Monitor labs and
Problem: Discharge Planning  Goal: Discharge to home or other facility with appropriate resources  11/28/2023 0319 by Ramona uGtierrez RN  Outcome: Progressing   Patient actively participates in ADLs, and decision making regarding plan of care   Problem: Chronic Conditions and Co-morbidities  Goal: Patient's chronic conditions and co-morbidity symptoms are monitored and maintained or improved  11/28/2023 0319 by Ramona Gutierrez RN  Outcome: Progressing   Problem: Safety - Adult  Goal: Free from fall injury  11/28/2023 0319 by Ramona Gutierrez RN  Outcome: Progressing   No falls/ injuries this shift, bed in lowest position, brakes on, bed alarm on, call light in reach, side rails up x2   Problem: Pain  Goal: Verbalizes/displays adequate comfort level or baseline comfort level  11/28/2023 0319 by Ramona Gutierrez RN  Outcome: Progressing   No new signs/symptoms of pain noted, pain rating < 3 on scale of 0-10, pain controlled with medication/ repositioning   Problem: Skin/Tissue Integrity  Goal: Absence of new skin breakdown  Description: 1. Monitor for areas of redness and/or skin breakdown  2. Assess vascular access sites hourly  3. Every 4-6 hours minimum:  Change oxygen saturation probe site  4. Every 4-6 hours:  If on nasal continuous positive airway pressure, respiratory therapy assess nares and determine need for appliance change or resting period.   11/28/2023 0319 by Ramona Gutierrez RN  Outcome: Progressing   No new skin breakdown noted, no new signs/symptoms of infection, continue to monitor lab work including WBC, medications administered per physician orders   Problem: Respiratory - Adult  Goal: Achieves optimal ventilation and oxygenation  11/28/2023 0319 by Ramona Gutierrez RN  Outcome: Progressing   HOB elevated to promote optimal oxygenation, no changes in oxygenation/mentation this shift, nocturnal O2 study completed this shift, O2 saturation maintained >90% on RA
Problem: Discharge Planning  Goal: Discharge to home or other facility with appropriate resources  11/28/2023 1615 by Treva Kerr RN  Outcome: Completed  11/28/2023 0319 by Selena Mitchell RN  Outcome: Progressing     Problem: Safety - Adult  Goal: Free from fall injury  11/28/2023 1615 by Treva Kerr RN  Outcome: Completed  11/28/2023 0319 by Selena Mitchell RN  Outcome: Progressing     Problem: Pain  Goal: Verbalizes/displays adequate comfort level or baseline comfort level  11/28/2023 1615 by Treva Kerr RN  Outcome: Completed  11/28/2023 0319 by Selena Mitchell RN  Outcome: Progressing     Problem: ABCDS Injury Assessment  Goal: Absence of physical injury  11/28/2023 1615 by Treva Kerr RN  Outcome: Completed  11/28/2023 0319 by Selena Mitchell RN  Outcome: Progressing     Problem: Skin/Tissue Integrity  Goal: Absence of new skin breakdown  Description: 1. Monitor for areas of redness and/or skin breakdown  2. Assess vascular access sites hourly  3. Every 4-6 hours minimum:  Change oxygen saturation probe site  4. Every 4-6 hours:  If on nasal continuous positive airway pressure, respiratory therapy assess nares and determine need for appliance change or resting period.   11/28/2023 1615 by Treva Kerr RN  Outcome: Completed  11/28/2023 0319 by Selena Mitchell RN  Outcome: Progressing     Problem: Metabolic/Fluid and Electrolytes - Adult  Goal: Electrolytes maintained within normal limits  11/28/2023 1615 by Treva Kerr RN  Outcome: Completed  11/28/2023 0319 by Selena Mitchell RN  Outcome: Progressing  Goal: Hemodynamic stability and optimal renal function maintained  11/28/2023 1615 by Treva Kerr RN  Outcome: Completed  11/28/2023 0319 by Selena Mitchell RN  Outcome: Progressing     Problem: Respiratory - Adult  Goal: Achieves optimal ventilation and oxygenation  11/28/2023 1615 by Treva Kerr RN  Outcome: Completed  11/28/2023 0319 by Selena Mitchell RN  Outcome: Progressing
Problem: Discharge Planning  Goal: Discharge to home or other facility with appropriate resources  Outcome: Progressing     Problem: Safety - Adult  Goal: Free from fall injury  Outcome: Progressing     Problem: Pain  Goal: Verbalizes/displays adequate comfort level or baseline comfort level  Outcome: Progressing
Problem: Discharge Planning  Goal: Discharge to home or other facility with appropriate resources  Outcome: Progressing     Problem: Safety - Adult  Goal: Free from fall injury  Outcome: Progressing     Problem: Pain  Goal: Verbalizes/displays adequate comfort level or baseline comfort level  Outcome: Progressing     Problem: ABCDS Injury Assessment  Goal: Absence of physical injury  Outcome: Progressing     Problem: Skin/Tissue Integrity  Goal: Absence of new skin breakdown  Description: 1. Monitor for areas of redness and/or skin breakdown  2. Assess vascular access sites hourly  3. Every 4-6 hours minimum:  Change oxygen saturation probe site  4. Every 4-6 hours:  If on nasal continuous positive airway pressure, respiratory therapy assess nares and determine need for appliance change or resting period.   Outcome: Progressing     Problem: Metabolic/Fluid and Electrolytes - Adult  Goal: Electrolytes maintained within normal limits  Outcome: Progressing  Goal: Hemodynamic stability and optimal renal function maintained  Outcome: Progressing     Problem: Respiratory - Adult  Goal: Achieves optimal ventilation and oxygenation  Outcome: Progressing     Problem: Skin/Tissue Integrity - Adult  Goal: Skin integrity remains intact  Outcome: Progressing     Problem: Chronic Conditions and Co-morbidities  Goal: Patient's chronic conditions and co-morbidity symptoms are monitored and maintained or improved  Outcome: Progressing
Problem: Discharge Planning  Goal: Discharge to home or other facility with appropriate resources  Outcome: Progressing   Patient actively participates in ADLs, and decision making regarding plan of care   Problem: Safety - Adult  Goal: Free from fall injury  Outcome: Progressing   No falls/ injuries this shift, bed in lowest position, brakes on, bed alarm on, call light in reach, side rails up x2   Problem: Pain  Goal: Verbalizes/displays adequate comfort level or baseline comfort level  Outcome: Progressing   No new signs/symptoms of pain noted, pain rating < 3 on scale of 0-10, pain controlled with medication/ repositioning   Problem: Skin/Tissue Integrity  Goal: Absence of new skin breakdown  Description: 1. Monitor for areas of redness and/or skin breakdown  2. Assess vascular access sites hourly  3. Every 4-6 hours minimum:  Change oxygen saturation probe site  4. Every 4-6 hours:  If on nasal continuous positive airway pressure, respiratory therapy assess nares and determine need for appliance change or resting period.   Outcome: Progressing   No new skin breakdown noted, no new signs/symptoms of infection, continue to monitor lab work including WBC, medications administered per physician orders   Problem: Respiratory - Adult  Goal: Achieves optimal ventilation and oxygenation  Outcome: Progressing   HOB elevated to promote optimal oxygenation, no changes in oxygenation/ mentation this shift, O2 saturation maintained >95% on 1.5 L/NC
Problem: Discharge Planning  Goal: Discharge to home or other facility with appropriate resources  Outcome: Progressing  Flowsheets (Taken 11/25/2023 1200 by Mart Xiong RN)  Discharge to home or other facility with appropriate resources:   Arrange for needed discharge resources and transportation as appropriate   Identify discharge learning needs (meds, wound care, etc)     Problem: Safety - Adult  Goal: Free from fall injury  Outcome: Progressing     Problem: Pain  Goal: Verbalizes/displays adequate comfort level or baseline comfort level  Outcome: Progressing     Problem: ABCDS Injury Assessment  Goal: Absence of physical injury  Outcome: Progressing     Problem: Skin/Tissue Integrity  Goal: Absence of new skin breakdown  Description: 1. Monitor for areas of redness and/or skin breakdown  2. Assess vascular access sites hourly  3. Every 4-6 hours minimum:  Change oxygen saturation probe site  4. Every 4-6 hours:  If on nasal continuous positive airway pressure, respiratory therapy assess nares and determine need for appliance change or resting period.   Outcome: Progressing     Problem: Metabolic/Fluid and Electrolytes - Adult  Goal: Electrolytes maintained within normal limits  Outcome: Progressing  Flowsheets (Taken 11/25/2023 1200 by Mart Xiong RN)  Electrolytes maintained within normal limits:   Monitor labs and assess patient for signs and symptoms of electrolyte imbalances   Monitor response to electrolyte replacements, including repeat lab results as appropriate  Goal: Hemodynamic stability and optimal renal function maintained  Outcome: Progressing  Flowsheets (Taken 11/25/2023 1200 by Mart Xiong RN)  Hemodynamic stability and optimal renal function maintained:   Monitor labs and assess for signs and symptoms of volume excess or deficit   Monitor intake, output and patient weight   Monitor urine specific gravity, serum osmolarity and serum sodium as indicated or ordered     Problem:
signs and symptoms of electrolyte imbalances   Monitor response to electrolyte replacements, including repeat lab results as appropriate  Goal: Hemodynamic stability and optimal renal function maintained  11/26/2023 0716 by General Kath RN  Outcome: Progressing  11/26/2023 0005 by Horace Longoria RN  Outcome: Progressing  Flowsheets (Taken 11/25/2023 1200 by Clint Carter RN)  Hemodynamic stability and optimal renal function maintained:   Monitor labs and assess for signs and symptoms of volume excess or deficit   Monitor intake, output and patient weight   Monitor urine specific gravity, serum osmolarity and serum sodium as indicated or ordered     Problem: Respiratory - Adult  Goal: Achieves optimal ventilation and oxygenation  11/26/2023 0716 by General Kath RN  Outcome: Progressing  11/26/2023 0005 by Horace Longoria RN  Outcome: Progressing  Flowsheets (Taken 11/25/2023 1200 by Clint Carter RN)  Achieves optimal ventilation and oxygenation:   Assess for changes in respiratory status   Assess for changes in mentation and behavior   Position to facilitate oxygenation and minimize respiratory effort   Oxygen supplementation based on oxygen saturation or arterial blood gases   Respiratory therapy support as indicated   Assess and instruct to report shortness of breath or any respiratory difficulty     Problem: Skin/Tissue Integrity - Adult  Goal: Skin integrity remains intact  11/26/2023 0716 by General Kath RN  Outcome: Progressing  11/26/2023 0005 by Horace Longoria RN  Outcome: Progressing  Flowsheets (Taken 11/25/2023 1200 by Clint Carter RN)  Skin Integrity Remains Intact:   Monitor for areas of redness and/or skin breakdown   Assess vascular access sites hourly     Problem: Chronic Conditions and Co-morbidities  Goal: Patient's chronic conditions and co-morbidity symptoms are monitored and maintained or improved  11/26/2023 0716 by General Kath RN  Outcome: Progressing  11/26/2023 0005 by Horace Longoria

## 2023-12-08 ENCOUNTER — OFFICE VISIT (OUTPATIENT)
Age: 88
End: 2023-12-08

## 2023-12-08 VITALS
TEMPERATURE: 97.8 F | HEIGHT: 69 IN | RESPIRATION RATE: 16 BRPM | BODY MASS INDEX: 15.7 KG/M2 | SYSTOLIC BLOOD PRESSURE: 124 MMHG | WEIGHT: 106 LBS | DIASTOLIC BLOOD PRESSURE: 52 MMHG | HEART RATE: 49 BPM

## 2023-12-08 DIAGNOSIS — E46 PROTEIN-CALORIE MALNUTRITION, UNSPECIFIED SEVERITY (HCC): ICD-10-CM

## 2023-12-08 DIAGNOSIS — Z87.891 FORMER SMOKER: ICD-10-CM

## 2023-12-08 DIAGNOSIS — R63.0 DECREASED APPETITE: ICD-10-CM

## 2023-12-08 DIAGNOSIS — I50.22 CHRONIC SYSTOLIC CONGESTIVE HEART FAILURE (HCC): ICD-10-CM

## 2023-12-08 DIAGNOSIS — R53.81 DEBILITATED: ICD-10-CM

## 2023-12-08 DIAGNOSIS — K59.00 CONSTIPATION, UNSPECIFIED CONSTIPATION TYPE: ICD-10-CM

## 2023-12-08 DIAGNOSIS — Z09 HOSPITAL DISCHARGE FOLLOW-UP: Primary | ICD-10-CM

## 2023-12-08 DIAGNOSIS — R53.1 WEAKNESS: ICD-10-CM

## 2023-12-08 DIAGNOSIS — J69.0 ASPIRATION PNEUMONIA OF RIGHT MIDDLE LOBE, UNSPECIFIED ASPIRATION PNEUMONIA TYPE (HCC): ICD-10-CM

## 2023-12-08 NOTE — PROGRESS NOTES
[facility]  [address] [place] [address]  Phone: [phone]  Fax: [phone]      Date of Visit:  2023  Patient Name: Suhas Leon   Patient :  1925     Assessment:     1. Hospital discharge follow-up    2. Aspiration pneumonia of right middle lobe, unspecified aspiration pneumonia type (HCC)    3. Chronic systolic congestive heart failure (HCC)    4. Debilitated    5. Weakness    6. Decreased appetite    7. Protein-calorie malnutrition, unspecified severity (HCC)    8. Constipation, unspecified constipation type    9. Former smoker        Plan:      Problem List:  1. Hospital Discharge TCM follow up for aspiration PNA R middle lobe, debilitated, weak, decreased appetite, low BMI- 15.51, protein calorie malnutrition:  Skilled nursing facility for rehab was recommended for patient during hospital stay and patient declined due to potential cost.     - increase calories to [id number] (incorporate meal replacement drinks)     - Patient to eat soft pureed diet with thickened liquids as recommended after swallow study at hospital to help prevent aspiration    - Order given to zhanen to physical therapy and occupational therapy- patient instructed to call to schedule for PT/OT at their home    - Labs ordered BMP and Magnesium- office will contact patient with results    - Patient to follow up with PCP for medical concerns, also has concerns related to mood and being scared that their health has declined.   =======  2. CHF  Echo- LVEF 20%  - Continue cardiac medications- refilled 1 month until patient sees cardiologist  - patient instructed to schedule appointment with cardiologist Dr. Christensen- referral given    =======  3. Constipation  - Miralax daily  - Mineral oil Fleet Suppository instructions recommended and described by Dr. Yeung: 1 suppository and hold as long as you can and then use another suppository in 10-minute after first in

## 2023-12-08 NOTE — PATIENT INSTRUCTIONS
Thank you for coming in today.  It was nice to see you again  - As discussed be sure to increase your calories to a minimum of 1600 to 1800 baljinder daily.  -Continue taking your heart medications, I will refill these for 1 month until you see cardiologist Dr. Christensen and Dr. Elise in January.  -Referral given for cardiologist Dr. Christensen that you saw at the hospital  -For constipation-take MiraLAX daily, also can use mineral oil Fleet enema as Dr. Yeung explained how to use-use 1 suppository and hold as long as you can and then use another suppository in 10-minute after first in the shower.   Please get your labs done for BMP and magnesium  -Order for home health care for physical therapy and Occupational Therapy given please schedule with them to come back provide care if your insurance allows if not contact our office and we will try to find somewhere that can help you.  -Follow-up with Dr. Elise in January for your appointment on January 9 at 2 PM.  If you have any questions or concerns please call the office.

## 2023-12-10 ENCOUNTER — HOSPITAL ENCOUNTER (INPATIENT)
Age: 88
LOS: 5 days | Discharge: OTHER INSTITUTION | DRG: 177 | End: 2023-12-16
Attending: EMERGENCY MEDICINE | Admitting: FAMILY MEDICINE
Payer: COMMERCIAL

## 2023-12-10 DIAGNOSIS — R19.7 DIARRHEA, UNSPECIFIED TYPE: Primary | ICD-10-CM

## 2023-12-10 DIAGNOSIS — J69.0 ASPIRATION PNEUMONIA OF BOTH UPPER LOBES, UNSPECIFIED ASPIRATION PNEUMONIA TYPE (HCC): ICD-10-CM

## 2023-12-10 LAB
ALBUMIN SERPL-MCNC: 3 G/DL (ref 3.5–5.2)
ALBUMIN/GLOB SERPL: 0.9 {RATIO} (ref 1–2.5)
ALP SERPL-CCNC: 83 U/L (ref 40–129)
ALT SERPL-CCNC: 12 U/L (ref 5–41)
ANION GAP SERPL CALCULATED.3IONS-SCNC: 8 MMOL/L (ref 9–17)
AST SERPL-CCNC: 18 U/L
BASOPHILS # BLD: 0.1 K/UL (ref 0–0.2)
BASOPHILS NFR BLD: 1 % (ref 0–2)
BILIRUB SERPL-MCNC: 1 MG/DL (ref 0.3–1.2)
BUN SERPL-MCNC: 26 MG/DL (ref 8–23)
CALCIUM SERPL-MCNC: 8.6 MG/DL (ref 8.6–10.4)
CHLORIDE SERPL-SCNC: 93 MMOL/L (ref 98–107)
CO2 SERPL-SCNC: 34 MMOL/L (ref 20–31)
CREAT SERPL-MCNC: 1.4 MG/DL (ref 0.7–1.2)
EOSINOPHIL # BLD: 0 K/UL (ref 0–0.4)
EOSINOPHILS RELATIVE PERCENT: 0 % (ref 1–4)
ERYTHROCYTE [DISTWIDTH] IN BLOOD BY AUTOMATED COUNT: 16.2 % (ref 12.5–15.4)
GFR SERPL CREATININE-BSD FRML MDRD: 45 ML/MIN/1.73M2
GLUCOSE SERPL-MCNC: 123 MG/DL (ref 70–99)
HCT VFR BLD AUTO: 35.1 % (ref 41–53)
HGB BLD-MCNC: 12 G/DL (ref 13.5–17.5)
LACTATE BLDV-SCNC: 1.3 MMOL/L (ref 0.5–2.2)
LYMPHOCYTES NFR BLD: 1.4 K/UL (ref 1–4.8)
LYMPHOCYTES RELATIVE PERCENT: 15 % (ref 24–44)
MCH RBC QN AUTO: 31.6 PG (ref 26–34)
MCHC RBC AUTO-ENTMCNC: 34.3 G/DL (ref 31–37)
MCV RBC AUTO: 92.1 FL (ref 80–100)
MONOCYTES NFR BLD: 1 K/UL (ref 0.1–1.2)
MONOCYTES NFR BLD: 11 % (ref 2–11)
NEUTROPHILS NFR BLD: 73 % (ref 36–66)
NEUTS SEG NFR BLD: 6.9 K/UL (ref 1.8–7.7)
PLATELET # BLD AUTO: 225 K/UL (ref 140–450)
PMV BLD AUTO: 10.6 FL (ref 6–12)
POTASSIUM SERPL-SCNC: 3.3 MMOL/L (ref 3.7–5.3)
PROT SERPL-MCNC: 6.3 G/DL (ref 6.4–8.3)
RBC # BLD AUTO: 3.81 M/UL (ref 4.5–5.9)
SODIUM SERPL-SCNC: 135 MMOL/L (ref 135–144)
TROPONIN I SERPL HS-MCNC: 31 NG/L (ref 0–22)
WBC OTHER # BLD: 9.3 K/UL (ref 3.5–11)

## 2023-12-10 PROCEDURE — 96374 THER/PROPH/DIAG INJ IV PUSH: CPT

## 2023-12-10 PROCEDURE — 94664 DEMO&/EVAL PT USE INHALER: CPT

## 2023-12-10 PROCEDURE — 85025 COMPLETE CBC W/AUTO DIFF WBC: CPT

## 2023-12-10 PROCEDURE — 6370000000 HC RX 637 (ALT 250 FOR IP)

## 2023-12-10 PROCEDURE — 2580000003 HC RX 258

## 2023-12-10 PROCEDURE — 80053 COMPREHEN METABOLIC PANEL: CPT

## 2023-12-10 PROCEDURE — 84484 ASSAY OF TROPONIN QUANT: CPT

## 2023-12-10 PROCEDURE — 94640 AIRWAY INHALATION TREATMENT: CPT

## 2023-12-10 PROCEDURE — 83605 ASSAY OF LACTIC ACID: CPT

## 2023-12-10 PROCEDURE — 36415 COLL VENOUS BLD VENIPUNCTURE: CPT

## 2023-12-10 PROCEDURE — 99285 EMERGENCY DEPT VISIT HI MDM: CPT

## 2023-12-10 RX ORDER — 0.9 % SODIUM CHLORIDE 0.9 %
500 INTRAVENOUS SOLUTION INTRAVENOUS ONCE
Status: COMPLETED | OUTPATIENT
Start: 2023-12-10 | End: 2023-12-11

## 2023-12-10 RX ORDER — IPRATROPIUM BROMIDE AND ALBUTEROL SULFATE 2.5; .5 MG/3ML; MG/3ML
1 SOLUTION RESPIRATORY (INHALATION) ONCE
Status: COMPLETED | OUTPATIENT
Start: 2023-12-10 | End: 2023-12-10

## 2023-12-10 RX ORDER — IPRATROPIUM BROMIDE AND ALBUTEROL SULFATE 2.5; .5 MG/3ML; MG/3ML
1 SOLUTION RESPIRATORY (INHALATION)
Status: DISCONTINUED | OUTPATIENT
Start: 2023-12-11 | End: 2023-12-10

## 2023-12-10 RX ADMIN — IPRATROPIUM BROMIDE AND ALBUTEROL SULFATE 1 DOSE: 2.5; .5 SOLUTION RESPIRATORY (INHALATION) at 23:27

## 2023-12-10 RX ADMIN — SODIUM CHLORIDE 500 ML: 9 INJECTION, SOLUTION INTRAVENOUS at 23:55

## 2023-12-10 ASSESSMENT — PAIN - FUNCTIONAL ASSESSMENT: PAIN_FUNCTIONAL_ASSESSMENT: NONE - DENIES PAIN

## 2023-12-11 ENCOUNTER — APPOINTMENT (OUTPATIENT)
Dept: GENERAL RADIOLOGY | Age: 88
DRG: 177 | End: 2023-12-11
Payer: COMMERCIAL

## 2023-12-11 ENCOUNTER — APPOINTMENT (OUTPATIENT)
Dept: CT IMAGING | Age: 88
DRG: 177 | End: 2023-12-11
Payer: COMMERCIAL

## 2023-12-11 PROBLEM — J96.01 ACUTE HYPOXIC RESPIRATORY FAILURE (HCC): Status: ACTIVE | Noted: 2023-12-11

## 2023-12-11 PROBLEM — K59.1 FUNCTIONAL DIARRHEA: Status: ACTIVE | Noted: 2023-12-11

## 2023-12-11 PROBLEM — N18.9 ACUTE KIDNEY INJURY SUPERIMPOSED ON CHRONIC KIDNEY DISEASE (HCC): Status: ACTIVE | Noted: 2023-12-11

## 2023-12-11 PROBLEM — E43 SEVERE PROTEIN-CALORIE MALNUTRITION (HCC): Status: ACTIVE | Noted: 2023-12-11

## 2023-12-11 PROBLEM — I50.42 CHRONIC COMBINED SYSTOLIC AND DIASTOLIC CONGESTIVE HEART FAILURE (HCC): Status: ACTIVE | Noted: 2023-05-22

## 2023-12-11 PROBLEM — J69.0 ASPIRATION PNEUMONIA OF LEFT UPPER LOBE (HCC): Status: ACTIVE | Noted: 2023-12-11

## 2023-12-11 PROBLEM — R09.02 HYPOXIA: Status: ACTIVE | Noted: 2023-12-11

## 2023-12-11 PROBLEM — N17.9 ACUTE KIDNEY INJURY SUPERIMPOSED ON CHRONIC KIDNEY DISEASE (HCC): Status: ACTIVE | Noted: 2023-12-11

## 2023-12-11 PROBLEM — E44.0 MODERATE PROTEIN-CALORIE MALNUTRITION (HCC): Status: ACTIVE | Noted: 2023-12-11

## 2023-12-11 PROBLEM — E87.6 HYPOKALEMIA: Status: ACTIVE | Noted: 2023-12-11

## 2023-12-11 PROBLEM — R09.02 HYPOXIA: Status: RESOLVED | Noted: 2023-12-11 | Resolved: 2023-12-11

## 2023-12-11 LAB
ALBUMIN SERPL-MCNC: 2.7 G/DL (ref 3.5–5.2)
ALBUMIN/GLOB SERPL: 0.8 {RATIO} (ref 1–2.5)
ALP SERPL-CCNC: 77 U/L (ref 40–129)
ALT SERPL-CCNC: 11 U/L (ref 5–41)
ANION GAP SERPL CALCULATED.3IONS-SCNC: 12 MMOL/L (ref 9–17)
AST SERPL-CCNC: 20 U/L
BASOPHILS # BLD: 0.01 K/UL (ref 0–0.2)
BASOPHILS NFR BLD: 0 % (ref 0–2)
BILIRUB SERPL-MCNC: 0.7 MG/DL (ref 0.3–1.2)
BUN SERPL-MCNC: 22 MG/DL (ref 8–23)
CALCIUM SERPL-MCNC: 8.2 MG/DL (ref 8.6–10.4)
CHLORIDE SERPL-SCNC: 95 MMOL/L (ref 98–107)
CO2 SERPL-SCNC: 28 MMOL/L (ref 20–31)
CREAT SERPL-MCNC: 1.2 MG/DL (ref 0.7–1.2)
EOSINOPHIL # BLD: 0.04 K/UL (ref 0–0.4)
EOSINOPHILS RELATIVE PERCENT: 0 % (ref 1–4)
ERYTHROCYTE [DISTWIDTH] IN BLOOD BY AUTOMATED COUNT: 16.3 % (ref 12.5–15.4)
GFR SERPL CREATININE-BSD FRML MDRD: 55 ML/MIN/1.73M2
GLUCOSE SERPL-MCNC: 83 MG/DL (ref 70–99)
HCT VFR BLD AUTO: 37.9 % (ref 41–53)
HGB BLD-MCNC: 12.2 G/DL (ref 13.5–17.5)
LYMPHOCYTES NFR BLD: 1.43 K/UL (ref 1–4.8)
LYMPHOCYTES RELATIVE PERCENT: 14 % (ref 24–44)
MCH RBC QN AUTO: 31.2 PG (ref 26–34)
MCHC RBC AUTO-ENTMCNC: 32.2 G/DL (ref 31–37)
MCV RBC AUTO: 96.9 FL (ref 80–100)
MONOCYTES NFR BLD: 1.02 K/UL (ref 0.1–1.2)
MONOCYTES NFR BLD: 10 % (ref 2–11)
NEUTROPHILS NFR BLD: 76 % (ref 36–66)
NEUTS SEG NFR BLD: 7.47 K/UL (ref 1.8–7.7)
PLATELET # BLD AUTO: 164 K/UL (ref 140–450)
PMV BLD AUTO: 12.8 FL (ref 8–14)
POTASSIUM SERPL-SCNC: 3.8 MMOL/L (ref 3.7–5.3)
PROT SERPL-MCNC: 6 G/DL (ref 6.4–8.3)
RBC # BLD AUTO: 3.91 M/UL (ref 4.5–5.9)
SARS-COV-2 RDRP RESP QL NAA+PROBE: NOT DETECTED
SODIUM SERPL-SCNC: 135 MMOL/L (ref 135–144)
SPECIMEN DESCRIPTION: NORMAL
TROPONIN I SERPL HS-MCNC: 26 NG/L (ref 0–22)
WBC OTHER # BLD: 10 K/UL (ref 3.5–11)

## 2023-12-11 PROCEDURE — 2700000000 HC OXYGEN THERAPY PER DAY

## 2023-12-11 PROCEDURE — 6360000002 HC RX W HCPCS: Performed by: NURSE PRACTITIONER

## 2023-12-11 PROCEDURE — 94640 AIRWAY INHALATION TREATMENT: CPT

## 2023-12-11 PROCEDURE — 71045 X-RAY EXAM CHEST 1 VIEW: CPT

## 2023-12-11 PROCEDURE — 97116 GAIT TRAINING THERAPY: CPT

## 2023-12-11 PROCEDURE — 99222 1ST HOSP IP/OBS MODERATE 55: CPT | Performed by: FAMILY MEDICINE

## 2023-12-11 PROCEDURE — 6360000002 HC RX W HCPCS

## 2023-12-11 PROCEDURE — 6370000000 HC RX 637 (ALT 250 FOR IP)

## 2023-12-11 PROCEDURE — 80053 COMPREHEN METABOLIC PANEL: CPT

## 2023-12-11 PROCEDURE — 94668 MNPJ CHEST WALL SBSQ: CPT

## 2023-12-11 PROCEDURE — 87635 SARS-COV-2 COVID-19 AMP PRB: CPT

## 2023-12-11 PROCEDURE — 85025 COMPLETE CBC W/AUTO DIFF WBC: CPT

## 2023-12-11 PROCEDURE — 2580000003 HC RX 258: Performed by: NURSE PRACTITIONER

## 2023-12-11 PROCEDURE — 2580000003 HC RX 258: Performed by: EMERGENCY MEDICINE

## 2023-12-11 PROCEDURE — 36415 COLL VENOUS BLD VENIPUNCTURE: CPT

## 2023-12-11 PROCEDURE — 84484 ASSAY OF TROPONIN QUANT: CPT

## 2023-12-11 PROCEDURE — 97166 OT EVAL MOD COMPLEX 45 MIN: CPT

## 2023-12-11 PROCEDURE — 97535 SELF CARE MNGMENT TRAINING: CPT

## 2023-12-11 PROCEDURE — 6360000004 HC RX CONTRAST MEDICATION: Performed by: EMERGENCY MEDICINE

## 2023-12-11 PROCEDURE — 94667 MNPJ CHEST WALL 1ST: CPT

## 2023-12-11 PROCEDURE — 1210000000 HC MED SURG R&B

## 2023-12-11 PROCEDURE — 94761 N-INVAS EAR/PLS OXIMETRY MLT: CPT

## 2023-12-11 PROCEDURE — 93005 ELECTROCARDIOGRAM TRACING: CPT

## 2023-12-11 PROCEDURE — 71260 CT THORAX DX C+: CPT

## 2023-12-11 PROCEDURE — 2580000003 HC RX 258

## 2023-12-11 PROCEDURE — 97162 PT EVAL MOD COMPLEX 30 MIN: CPT

## 2023-12-11 RX ORDER — ONDANSETRON 2 MG/ML
4 INJECTION INTRAMUSCULAR; INTRAVENOUS EVERY 6 HOURS PRN
Status: DISCONTINUED | OUTPATIENT
Start: 2023-12-11 | End: 2023-12-16 | Stop reason: HOSPADM

## 2023-12-11 RX ORDER — AMIODARONE HYDROCHLORIDE 200 MG/1
200 TABLET ORAL 2 TIMES DAILY
Status: DISCONTINUED | OUTPATIENT
Start: 2023-12-11 | End: 2023-12-16 | Stop reason: HOSPADM

## 2023-12-11 RX ORDER — POTASSIUM CHLORIDE 7.45 MG/ML
10 INJECTION INTRAVENOUS
Status: DISCONTINUED | OUTPATIENT
Start: 2023-12-11 | End: 2023-12-11

## 2023-12-11 RX ORDER — ASPIRIN 81 MG/1
81 TABLET, CHEWABLE ORAL DAILY
Status: DISCONTINUED | OUTPATIENT
Start: 2023-12-11 | End: 2023-12-16 | Stop reason: HOSPADM

## 2023-12-11 RX ORDER — POTASSIUM CHLORIDE 7.45 MG/ML
10 INJECTION INTRAVENOUS PRN
Status: DISCONTINUED | OUTPATIENT
Start: 2023-12-11 | End: 2023-12-11

## 2023-12-11 RX ORDER — TAMSULOSIN HYDROCHLORIDE 0.4 MG/1
0.4 CAPSULE ORAL DAILY
Status: DISCONTINUED | OUTPATIENT
Start: 2023-12-11 | End: 2023-12-11

## 2023-12-11 RX ORDER — ACETAMINOPHEN 650 MG/1
650 SUPPOSITORY RECTAL EVERY 6 HOURS PRN
Status: DISCONTINUED | OUTPATIENT
Start: 2023-12-11 | End: 2023-12-16 | Stop reason: HOSPADM

## 2023-12-11 RX ORDER — SODIUM CHLORIDE 0.9 % (FLUSH) 0.9 %
5-40 SYRINGE (ML) INJECTION EVERY 12 HOURS SCHEDULED
Status: DISCONTINUED | OUTPATIENT
Start: 2023-12-11 | End: 2023-12-16 | Stop reason: HOSPADM

## 2023-12-11 RX ORDER — 0.9 % SODIUM CHLORIDE 0.9 %
80 INTRAVENOUS SOLUTION INTRAVENOUS ONCE
Status: COMPLETED | OUTPATIENT
Start: 2023-12-11 | End: 2023-12-11

## 2023-12-11 RX ORDER — SODIUM CHLORIDE 0.9 % (FLUSH) 0.9 %
10 SYRINGE (ML) INJECTION PRN
Status: COMPLETED | OUTPATIENT
Start: 2023-12-11 | End: 2023-12-11

## 2023-12-11 RX ORDER — 0.9 % SODIUM CHLORIDE 0.9 %
90 INTRAVENOUS SOLUTION INTRAVENOUS ONCE
Status: DISCONTINUED | OUTPATIENT
Start: 2023-12-11 | End: 2023-12-11

## 2023-12-11 RX ORDER — 0.9 % SODIUM CHLORIDE 0.9 %
100 INTRAVENOUS SOLUTION INTRAVENOUS ONCE
Status: DISCONTINUED | OUTPATIENT
Start: 2023-12-11 | End: 2023-12-11

## 2023-12-11 RX ORDER — TAMSULOSIN HYDROCHLORIDE 0.4 MG/1
0.4 CAPSULE ORAL DAILY
COMMUNITY

## 2023-12-11 RX ORDER — FUROSEMIDE 20 MG/1
20 TABLET ORAL DAILY
Status: DISCONTINUED | OUTPATIENT
Start: 2023-12-11 | End: 2023-12-16 | Stop reason: HOSPADM

## 2023-12-11 RX ORDER — ALBUTEROL SULFATE 2.5 MG/3ML
2.5 SOLUTION RESPIRATORY (INHALATION) EVERY 4 HOURS PRN
Status: DISCONTINUED | OUTPATIENT
Start: 2023-12-11 | End: 2023-12-16 | Stop reason: HOSPADM

## 2023-12-11 RX ORDER — SODIUM CHLORIDE 9 MG/ML
INJECTION, SOLUTION INTRAVENOUS CONTINUOUS
Status: DISCONTINUED | OUTPATIENT
Start: 2023-12-11 | End: 2023-12-13

## 2023-12-11 RX ORDER — ONDANSETRON 4 MG/1
4 TABLET, ORALLY DISINTEGRATING ORAL EVERY 8 HOURS PRN
Status: DISCONTINUED | OUTPATIENT
Start: 2023-12-11 | End: 2023-12-16 | Stop reason: HOSPADM

## 2023-12-11 RX ORDER — FINASTERIDE 5 MG/1
5 TABLET, FILM COATED ORAL DAILY
Status: DISCONTINUED | OUTPATIENT
Start: 2023-12-11 | End: 2023-12-16 | Stop reason: HOSPADM

## 2023-12-11 RX ORDER — HEPARIN SODIUM 5000 [USP'U]/ML
5000 INJECTION, SOLUTION INTRAVENOUS; SUBCUTANEOUS 2 TIMES DAILY
Status: DISCONTINUED | OUTPATIENT
Start: 2023-12-11 | End: 2023-12-16 | Stop reason: HOSPADM

## 2023-12-11 RX ORDER — ACETAMINOPHEN 325 MG/1
650 TABLET ORAL EVERY 6 HOURS PRN
Status: DISCONTINUED | OUTPATIENT
Start: 2023-12-11 | End: 2023-12-16 | Stop reason: HOSPADM

## 2023-12-11 RX ORDER — SIMVASTATIN 20 MG
20 TABLET ORAL NIGHTLY
Status: ON HOLD | COMMUNITY
End: 2023-12-11

## 2023-12-11 RX ORDER — SODIUM CHLORIDE 9 MG/ML
INJECTION, SOLUTION INTRAVENOUS PRN
Status: DISCONTINUED | OUTPATIENT
Start: 2023-12-11 | End: 2023-12-16 | Stop reason: HOSPADM

## 2023-12-11 RX ORDER — MAGNESIUM SULFATE IN WATER 40 MG/ML
2000 INJECTION, SOLUTION INTRAVENOUS PRN
Status: DISCONTINUED | OUTPATIENT
Start: 2023-12-11 | End: 2023-12-11

## 2023-12-11 RX ORDER — POLYETHYLENE GLYCOL 3350 17 G/17G
17 POWDER, FOR SOLUTION ORAL DAILY PRN
Status: DISCONTINUED | OUTPATIENT
Start: 2023-12-11 | End: 2023-12-16 | Stop reason: HOSPADM

## 2023-12-11 RX ORDER — POTASSIUM CHLORIDE 20 MEQ/1
40 TABLET, EXTENDED RELEASE ORAL PRN
Status: DISCONTINUED | OUTPATIENT
Start: 2023-12-11 | End: 2023-12-11

## 2023-12-11 RX ORDER — SODIUM CHLORIDE 0.9 % (FLUSH) 0.9 %
10 SYRINGE (ML) INJECTION PRN
Status: DISCONTINUED | OUTPATIENT
Start: 2023-12-11 | End: 2023-12-16 | Stop reason: HOSPADM

## 2023-12-11 RX ORDER — CEFTRIAXONE 1 G/1
1000 INJECTION, POWDER, FOR SOLUTION INTRAMUSCULAR; INTRAVENOUS EVERY 24 HOURS
Status: DISCONTINUED | OUTPATIENT
Start: 2023-12-11 | End: 2023-12-11

## 2023-12-11 RX ORDER — ALBUTEROL SULFATE 2.5 MG/3ML
2.5 SOLUTION RESPIRATORY (INHALATION)
Status: DISCONTINUED | OUTPATIENT
Start: 2023-12-11 | End: 2023-12-14

## 2023-12-11 RX ADMIN — ALBUTEROL SULFATE 2.5 MG: 2.5 SOLUTION RESPIRATORY (INHALATION) at 16:45

## 2023-12-11 RX ADMIN — POTASSIUM CHLORIDE 10 MEQ: 7.46 INJECTION, SOLUTION INTRAVENOUS at 01:39

## 2023-12-11 RX ADMIN — SODIUM CHLORIDE: 9 INJECTION, SOLUTION INTRAVENOUS at 11:41

## 2023-12-11 RX ADMIN — ASPIRIN 81 MG CHEWABLE TABLET 81 MG: 81 TABLET CHEWABLE at 09:58

## 2023-12-11 RX ADMIN — SODIUM CHLORIDE, PRESERVATIVE FREE 10 ML: 5 INJECTION INTRAVENOUS at 10:04

## 2023-12-11 RX ADMIN — METOPROLOL TARTRATE 25 MG: 25 TABLET, FILM COATED ORAL at 09:58

## 2023-12-11 RX ADMIN — SODIUM CHLORIDE 80 ML: 9 INJECTION, SOLUTION INTRAVENOUS at 02:07

## 2023-12-11 RX ADMIN — AMIODARONE HYDROCHLORIDE 200 MG: 200 TABLET ORAL at 21:33

## 2023-12-11 RX ADMIN — HEPARIN SODIUM 5000 UNITS: 5000 INJECTION INTRAVENOUS; SUBCUTANEOUS at 09:57

## 2023-12-11 RX ADMIN — PIPERACILLIN AND TAZOBACTAM 3375 MG: 3; .375 INJECTION, POWDER, LYOPHILIZED, FOR SOLUTION INTRAVENOUS at 17:22

## 2023-12-11 RX ADMIN — SODIUM CHLORIDE, PRESERVATIVE FREE 10 ML: 5 INJECTION INTRAVENOUS at 02:07

## 2023-12-11 RX ADMIN — AZITHROMYCIN MONOHYDRATE 500 MG: 500 INJECTION, POWDER, LYOPHILIZED, FOR SOLUTION INTRAVENOUS at 06:29

## 2023-12-11 RX ADMIN — PIPERACILLIN AND TAZOBACTAM 4500 MG: 4; .5 INJECTION, POWDER, LYOPHILIZED, FOR SOLUTION INTRAVENOUS at 10:02

## 2023-12-11 RX ADMIN — IOPAMIDOL 75 ML: 755 INJECTION, SOLUTION INTRAVENOUS at 02:07

## 2023-12-11 RX ADMIN — AMIODARONE HYDROCHLORIDE 200 MG: 200 TABLET ORAL at 09:58

## 2023-12-11 RX ADMIN — HEPARIN SODIUM 5000 UNITS: 5000 INJECTION INTRAVENOUS; SUBCUTANEOUS at 21:33

## 2023-12-11 RX ADMIN — CEFTRIAXONE SODIUM 1000 MG: 1 INJECTION, POWDER, FOR SOLUTION INTRAMUSCULAR; INTRAVENOUS at 05:50

## 2023-12-11 RX ADMIN — POTASSIUM BICARBONATE 40 MEQ: 782 TABLET, EFFERVESCENT ORAL at 09:57

## 2023-12-11 RX ADMIN — FINASTERIDE 5 MG: 5 TABLET, FILM COATED ORAL at 10:04

## 2023-12-11 NOTE — PROGRESS NOTES
Electrolyte Replacement Protocols for Magnesium and Potassium have been discontinued due to CrCl = 21 ml/min  Currently no magnesium level  K = 3.3  so will administer 40 mEq oral x 1 dose    all future levels will dayanara to be monitored. yes

## 2023-12-11 NOTE — CONSULTS
[address] [facility] PULMONARY, CRITICAL CARE & SLEEP   Syeda Merino MD/ Jayjay Lloyd MD/ Camryn Bunch MD/ Dr Lauren Prescott APRN AGACNP-BC NP-C  Jenelle Hernandez APRN NP-C  [address] Amber JOSHUA NP-C   CONSULT NOTE      Date of Admission: 12/10/2023 10:47 PM    Chief complaint: \"I have a cough\"    Referring Physician: * No referring provider recorded for this case *  PCP: Leona Iniguez MD     History of Present Illness: Roselie Schaumann is a 80 y.o. White (non-)   male who presents to hospital after 4 days of not feeling well. He states he has had a cough after having episodes of constipation then diarrhea. He states he always has chronic cough and mucus. He is a former smoker but he quit over 50 years ago. He does not see a pulmonologist, he has no prior history of COPD or asthma. He denies any chest pain or pleuritic pain. He does have some chest congestion, which she did not notice that much over the last several days. He has not had any fevers. He states he has never had COVID. He has had vaccines in the past.    He did have a CT angiogram of the chest which was negative for any pulmonary embolism but did show mild to moderate right-sided pleural effusion as well as left mainstem mucous plug. No obvious infiltrates noted. He is not normally on oxygen at home, he currently is on 4 L with adequate saturations. He does not appear to be in distress but he does have a persistent congested cough. He did have a swallow eval back in November, showed penetration without aspiration. Initial labs showed a normal white count, hemoglobin of 12, platelet count of 631, mildly elevated bicarb of 34, potassium 3.3, creatinine of 1.4, normal lactate, COVID was negative. He was placed empirically on Zosyn as well as ceftriaxone as there is concern of prior aspiration. He is currently on a moderately thickened diet with thickened liquids as well.     He did have echocardiogram in November, 2023 that shows an EF of 20 to 25%, as well as abnormal diastolic function.  No intra-atrial shunt.  Right ventricular size and function was normal.    He did work at [employer] planned working as a  as well as many other positions, he denies any asbestos exposure.  He states he did have some exposure to tar fumes in the past.  No prior history of DVT or pulmonary embolism.    He currently has been stable on 4 L.  No acute issues or concerns per nursing or respiratory.      Problem:  Principal Problem: Left-sided pleural effusion and possible aspiration pneumonia    PMH:   Past Medical History:   Diagnosis Date    BPH (benign prostatic hyperplasia)     Cataracts, bilateral     COPD (chronic obstructive pulmonary disease) ([facility])     Former smoker     Hearing loss     History of intermittent claudication     Hyperlipidemia     Intestinal infection due to Clostridium difficile 7/16/2019    OA (osteoarthritis) of knee     Pneumonia     Prediabetes     Spinal stenosis        PSH:   Past Surgical History:   Procedure Laterality Date    CORONARY ARTERY BYPASS GRAFT  1989    EYE SURGERY  1991    Cataracts    KNEE SURGERY Bilateral     TONSILLECTOMY  1931       Allergies: No Known Allergies    Home Meds:  Medications Prior to Admission: tamsulosin (FLOMAX) 0.4 MG capsule, Take 1 capsule by mouth daily  [DISCONTINUED] simvastatin (ZOCOR) 20 MG tablet, Take 1 tablet by mouth nightly  aspirin (ASPIRIN 81) 81 MG chewable tablet, Take 1 tablet by mouth daily (Patient not taking: Reported on 12/11/2023)  amiodarone (CORDARONE) 200 MG tablet, Take 1 tablet by mouth 2 times daily (Patient not taking: Reported on 12/11/2023)  metoprolol tartrate (LOPRESSOR) 25 MG tablet, Take 1 tablet by mouth 2 times daily (Patient not taking: Reported on 12/11/2023)  furosemide (LASIX) 20 MG tablet, Take 1 tablet by mouth daily (Patient not taking: Reported on 12/11/2023)  finasteride (PROSCAR) 5 MG tablet, Take 1 tablet by mouth

## 2023-12-11 NOTE — ED PROVIDER NOTES
[address]  Emergency Department Encounter  Mid Level Provider     Pt Name: Kelly Farrar  MRN: [id number]  Birthdate 8/14/1925  Date of evaluation: 12/10/23  PCP:  MD Tanja Martinez       Chief Complaint   Patient presents with    Diarrhea     Patient states having brown watery diarrhea for two days. Patient states being taking to the hospital for constipation was giving a enema and been having diarrhea ever since        HISTORY OF PRESENT ILLNESS  (Location/Symptom, Timing/Onset,Context/Setting, Quality, Duration, Modifying Factors, Severity.)      Kelly Farrar is a 80 y.o. male who presents with complaints of diarrhea that has been ongoing for 2 days. He reports he had a recent ER visit for constipation where he was given an enema and has been having diarrhea since the enema. He also complaining of some shortness of breath. His wife reports he has a recent hospital admission for pneumonia. He denies any fevers, chills, nausea, vomiting. He denies any recent choking events. On examination he has a wet gurgling voice with diminished breath sounds on the right and rhonchi on the left. His SpO2 saturation was 80% on room air without any significant respiratory distress. He was placed on nasal cannula oxygen with plans to initiate BiPAP if he does not maintain adequate SpO2 saturation. PAST MEDICAL /SURGICAL / SOCIAL / FAMILY HISTORY      has a past medical history of BPH (benign prostatic hyperplasia), Cataracts, bilateral, COPD (chronic obstructive pulmonary disease) ([address]), Former smoker, Hearing loss, History of intermittent claudication, Hyperlipidemia, Intestinal infection due to Clostridium difficile, OA (osteoarthritis) of knee, Pneumonia, Prediabetes, and Spinal stenosis. has a past surgical history that includes Coronary artery bypass graft (1989); Eye surgery (1991); knee surgery (Bilateral); and Tonsillectomy (1931).     Social History     Socioeconomic History mg/dL    Creatinine 1.2 0.7 - 1.2 mg/dL    Est, Glom Filt Rate 55 (L) >60 mL/min/1.73m2    Calcium 8.2 (L) 8.6 - 10.4 mg/dL    Total Protein 6.0 (L) 6.4 - 8.3 g/dL    Albumin 2.7 (L) 3.5 - 5.2 g/dL    Albumin/Globulin Ratio 0.8 (L) 1.0 - 2.5    Total Bilirubin 0.7 0.3 - 1.2 mg/dL    Alkaline Phosphatase 77 40 - 129 U/L    ALT 11 5 - 41 U/L    AST 20 <40 U/L   Troponin   Result Value Ref Range    Troponin, High Sensitivity 26 (H) 0 - 22 ng/L   Protime-INR   Result Value Ref Range    Protime 11.5 9.4 - 12.6 sec    INR 1.1    Procalcitonin   Result Value Ref Range    Procalcitonin 0.11 (H) <0.09 ng/mL   Brain Natriuretic Peptide   Result Value Ref Range    Pro-BNP 5,471 (H) <300 pg/mL   Basic Metabolic Panel   Result Value Ref Range    Sodium 140 135 - 144 mmol/L    Potassium 3.6 (L) 3.7 - 5.3 mmol/L    Chloride 98 98 - 107 mmol/L    CO2 32 (H) 20 - 31 mmol/L    Anion Gap 10 9 - 17 mmol/L    Glucose 87 70 - 99 mg/dL    BUN 17 8 - 23 mg/dL    Creatinine 1.1 0.7 - 1.2 mg/dL    Est, Glom Filt Rate >60 >60 mL/min/1.73m2    Calcium 8.2 (L) 8.6 - 10.4 mg/dL   CBC   Result Value Ref Range    WBC 8.7 3.5 - 11.0 k/uL    RBC 3.80 (L) 4.5 - 5.9 m/uL    Hemoglobin 11.9 (L) 13.5 - 17.5 g/dL    Hematocrit 35.2 (L) 41 - 53 %    MCV 92.6 80 - 100 fL    MCH 31.2 26 - 34 pg    MCHC 33.7 31 - 37 g/dL    RDW 16.7 (H) 12.5 - 15.4 %    Platelets 195 140 - 450 k/uL    MPV 10.5 6.0 - 12.0 fL   CBC with Auto Differential   Result Value Ref Range    WBC 5.9 3.5 - 11.0 k/uL    RBC 3.88 (L) 4.5 - 5.9 m/uL    Hemoglobin 12.1 (L) 13.5 - 17.5 g/dL    Hematocrit 35.8 (L) 41 - 53 %    MCV 92.4 80 - 100 fL    MCH 31.1 26 - 34 pg    MCHC 33.7 31 - 37 g/dL    RDW 16.5 (H) 12.5 - 15.4 %    Platelets 194 140 - 450 k/uL    MPV 10.4 6.0 - 12.0 fL    Neutrophils % 69 (H) 36 - 66 %    Lymphocytes % 18 (L) 24 - 44 %    Monocytes % 10 2 - 11 %    Eosinophils % 2 1 - 4 %    Basophils % 1 0 - 2 %    Neutrophils Absolute 4.10 1.8 - 7.7 k/uL    Lymphocytes Absolute  1. 10 1.0 - 4.8 k/uL    Monocytes Absolute 0.60 0.1 - 1.2 k/uL    Eosinophils Absolute 0.10 0.0 - 0.4 k/uL    Basophils Absolute 0.00 0.0 - 0.2 k/uL   Basic Metabolic Panel w/ Reflex to MG   Result Value Ref Range    Sodium 138 135 - 144 mmol/L    Potassium 3.5 (L) 3.7 - 5.3 mmol/L    Chloride 98 98 - 107 mmol/L    CO2 33 (H) 20 - 31 mmol/L    Anion Gap 7 (L) 9 - 17 mmol/L    Glucose 107 (H) 70 - 99 mg/dL    BUN 14 8 - 23 mg/dL    Creatinine 1.0 0.7 - 1.2 mg/dL    Est, Glom Filt Rate >60 >60 mL/min/1.73m2    Calcium 8.1 (L) 8.6 - 10.4 mg/dL   Magnesium   Result Value Ref Range    Magnesium 1.8 1.6 - 2.6 mg/dL   EKG 12 Lead   Result Value Ref Range    Ventricular Rate 67 BPM    Atrial Rate 67 BPM    P-R Interval 440 ms    QRS Duration 126 ms    Q-T Interval 438 ms    QTc Calculation (Bazett) 462 ms    R Axis 29 degrees    T Axis 76 degrees       Consults:      Procedures:      Re-Evaluation & Disposition:  See attending physicians documentation for re evaluation and disposition      FINAL IMPRESSION      No diagnosis found. DISPOSITION / PLAN     Disposition Admitted    Patient Refferred to: No follow-up provider specified.     Discharge Medications:  Current Discharge Medication List          JOSHUA Matias CNP   Emergency Medicine Nurse Practitioner    (Please note that portions of this note were completed with a voice recognitionprogram.  Efforts were made to edit the dictations but occasionally words are mis-transcribed.)      JOSHUA Matias CNP  12/13/23 [id number]

## 2023-12-11 NOTE — ED PROVIDER NOTES
ATTENDING ATTESTATION OF SHARED VISIT          Roselie Schaumann is a 80 y.o. male who was seen with a NPP,  I preformed a face to face encounter with the patient and actively participated in at least two of the following:  History, Physical Exam, Medical Decision making. Please refer to NPP note for expanded documentation       Chief Complaint   Patient presents with    Diarrhea     Patient states having brown watery diarrhea for two days. Patient states being taking to the hospital for constipation was giving a enema and been having diarrhea ever since        [address] [employer] ILLNESS/MDM       Roselie Schaumann is a 80 y.o. male who presents to the emergency department with diarrhea after taking laxative for constipation. Found to have low Oxygen sats. Ct done which shows left bronchus occluded with likely aspirate. Will admit to medicine.         Renuka Peña MD  Attending Emergency Physician           Renuka Peña MD  12/11/23 [id number]

## 2023-12-11 NOTE — PLAN OF CARE
Problem: Discharge Planning  Goal: Discharge to home or other facility with appropriate resources  Outcome: Progressing  Flowsheets (Taken 12/11/2023 0800)  Discharge to home or other facility with appropriate resources:   Identify barriers to discharge with patient and caregiver   Arrange for needed discharge resources and transportation as appropriate   Identify discharge learning needs (meds, wound care, etc)   Refer to discharge planning if patient needs post-hospital services based on physician order or complex needs related to functional status, cognitive ability or social support system     Problem: Chronic Conditions and Co-morbidities  Goal: Patient's chronic conditions and co-morbidity symptoms are monitored and maintained or improved  Outcome: Progressing  Flowsheets (Taken 12/11/2023 0800)  Care Plan - Patient's Chronic Conditions and Co-Morbidity Symptoms are Monitored and Maintained or Improved:   Monitor and assess patient's chronic conditions and comorbid symptoms for stability, deterioration, or improvement   Update acute care plan with appropriate goals if chronic or comorbid symptoms are exacerbated and prevent overall improvement and discharge   Collaborate with multidisciplinary team to address chronic and comorbid conditions and prevent exacerbation or deterioration     Problem: Safety - Adult  Goal: Free from fall injury  Outcome: Progressing     Problem: ABCDS Injury Assessment  Goal: Absence of physical injury  Outcome: Progressing

## 2023-12-11 NOTE — H&P
[address] [facility]  Inpatient Service     HISTORY AND PHYSICAL EXAMINATION            Date:   12/11/2023  Patient name:  Carlotta Dee  Date of admission:  12/10/2023 10:47 PM  MRN:   [id number]  Account:  [de-identified]  YOB: 1925  PCP:    Deshaun Loyd MD  Room:   823/109-02  Code Status:    DNR-CCA  Emergency contact Adolph [phone] spouse  History Obtained From:     Patient and EMR    History of Present Illness:     Carlotta Dee is a 80 y.o. Non- / non  male who presents with Diarrhea (Patient states having brown watery diarrhea for two days. Patient states being taking to the hospital for constipation was giving a enema and been having diarrhea ever since )  and is admitted to the hospital for the management of Aspiration pneumonia of left upper lobe ([address] [facility]). Patient is a 79 yo male with significant past medical history of BPH, COPD, intermittent claudication, HLD, osteoarthritis, prediabetes, and spinal stenosis who presented with complaints of diarrhea. Patient reports having diarrhea for 2 days, with recent Urgent Care visit on 12/1 due to constipation and was given an Enema. Since at PCP office on 12/8 was told to take Miralax daily and enema. Patient reported he has been having diarrhea yesterday every 10 minutes. Pt also had a recent admission on 11/21 for severe sepsis, CAP and HFrEF (20-25%). He denies any blood in his stool. He has also had trouble breathing. ED course  In the ED, patient became hypoxic on room air, saturating at 80% and was placed 5L O2 NC. Received one Duoneb treatment. Patient has been afebrile, mildly hypertensive, and mildly bradycardia. COVID negative. CXR showed small right effusion that is unchanged from previous. CT chest pulmonary embolism reported negative for PE.  New finding of complete occlusion of the left mainstem bronchus proximally extending well into inferior branches and General: No swelling, tenderness or deformity.   Skin:     Capillary Refill: Capillary refill takes less than 2 seconds.      Coloration: Skin is not jaundiced or pale.      Findings: No bruising or erythema.   Neurological:      Mental Status: He is alert.        Investigations:      Laboratory Testing:  Recent Results (from the past 24 hour(s))   CBC with Auto Differential    Collection Time: 12/10/23 11:00 PM   Result Value Ref Range    WBC 9.3 3.5 - 11.0 k/uL    RBC 3.81 (L) 4.5 - 5.9 m/uL    Hemoglobin 12.0 (L) 13.5 - 17.5 g/dL    Hematocrit 35.1 (L) 41 - 53 %    MCV 92.1 80 - 100 fL    MCH 31.6 26 - 34 pg    MCHC 34.3 31 - 37 g/dL    RDW 16.2 (H) 12.5 - 15.4 %    Platelets 225 140 - 450 k/uL    MPV 10.6 6.0 - 12.0 fL    Neutrophils % 73 (H) 36 - 66 %    Lymphocytes % 15 (L) 24 - 44 %    Monocytes % 11 2 - 11 %    Eosinophils % 0 (L) 1 - 4 %    Basophils % 1 0 - 2 %    Neutrophils Absolute 6.90 1.8 - 7.7 k/uL    Lymphocytes Absolute 1.40 1.0 - 4.8 k/uL    Monocytes Absolute 1.00 0.1 - 1.2 k/uL    Eosinophils Absolute 0.00 0.0 - 0.4 k/uL    Basophils Absolute 0.10 0.0 - 0.2 k/uL   CMP    Collection Time: 12/10/23 11:00 PM   Result Value Ref Range    Sodium 135 135 - 144 mmol/L    Potassium 3.3 (L) 3.7 - 5.3 mmol/L    Chloride 93 (L) 98 - 107 mmol/L    CO2 34 (H) 20 - 31 mmol/L    Anion Gap 8 (L) 9 - 17 mmol/L    Glucose 123 (H) 70 - 99 mg/dL    BUN 26 (H) 8 - 23 mg/dL    Creatinine 1.4 (H) 0.7 - 1.2 mg/dL    Est, Glom Filt Rate 45 (L) >60 mL/min/1.73m2    Calcium 8.6 8.6 - 10.4 mg/dL    Total Protein 6.3 (L) 6.4 - 8.3 g/dL    Albumin 3.0 (L) 3.5 - 5.2 g/dL    Albumin/Globulin Ratio 0.9 (L) 1.0 - 2.5    Total Bilirubin 1.0 0.3 - 1.2 mg/dL    Alkaline Phosphatase 83 40 - 129 U/L    ALT 12 5 - 41 U/L    AST 18 <40 U/L   Lactic Acid    Collection Time: 12/10/23 11:00 PM   Result Value Ref Range    Lactic Acid 1.3 0.5 - 2.2 mmol/L   Troponin    Collection Time: 12/10/23 11:00 PM   Result Value Ref Range    Troponin,  tolerated  Currently on 4L O2   Functional diarrhea  Pt received an enema recently   IVMF NS @ 90 mL/hr   Reports improvement- differential of infectious diarrhea is less likely, if worsening will consider GI panel and C.diff due to recent abx use on admission on 11/21   Chronic kidney disease, stage ll   OWEN superimposed on CKD   Initially Cr: 1.4, BUM 26, eGFR 45   Received IV NS 500ml bolus in ED   Repeat labs show OWEN resolved- eGFR 55   Hypokalemia   Potassium 3.3  Received one dose of IV 10 mEq Potassium   Potassium replacement protocol >4.0  Hypertension   HLD  Congestive Heart Failure  Continue home metoprolol  Continue home ASA 81mg  Continue home amio 200mg   Severe Protein- calorie malnutrition   Total protein 6.0, albumin 2.7, BMI 16.25  BPH  Continue home finasteride  [facility]    Patient is admitted as inpatient status because of co-morbidities listed above, severity of signs and symptoms as outlined, requirement for current medical therapies and most importantly because of direct risk to patient if care not provided in a hospital setting. Expected length of stay > 48 hours. Patient is admitted in the Med/Surge    Telemetry: ordered   Anticoagulation: Heparin 5000 SubQ injections   Dispo: [place]     Patient was seen, evaluated and discussed with DO Carrie Noyola MD  Family Medicine Resident, PGY-1   12/11/2023  11:09 AM    Copy sent to Dr. Dede Galvez MD     Senior Resident Attestation:    I have independently seen Daniel Plasencia and discussed the assessment and plan with the intern listed above and the attending Paola Hampton DO. I have reviewed the note and have included any edits or additional information above. Garret Feng MD   Family Medicine Resident, PGY-2   12/11/2023  6:07 PM    Attending Physician Statement  I have seen and discussed the care of Daniel Plasencia including pertinent history and exam findings, with the resident.  I have

## 2023-12-11 NOTE — PLAN OF CARE
[address] [facility] PULMONARY, CRITICAL CARE & SLEEP  Seth Locket, MD Mannie Ahmadi, MD Maryella Duane MD Ethridge Rosebush MD  [facility]   Brief note      Date of Admission: 12/10/2023 10:47 PM    Chief complaint: SOB    Referring Physician: * No referring provider recorded for this case *  PCP: Brian Miguel MD     Subject: Notified of consult. CT reviewed which does show mucus plugging bilaterally. Assessment:  Mucus plugging with near complete opacification of left mainstem. LLL and RLL also involved. Bilateral pleural effusion R>>L       Plan:  Start vest therapy  Probable thoracentesis on the right  Full consult later today    Critical Care Time: 0 minutes    Maryella Duane, MD, [facility]  [phone] (Cell)  [phone] (office/answering service)  [phone] (fax)      Electronically signed by Princess Benavides MD on 12/11/23     This progress note was completed using a voice transcription system. Every effort was made to ensure accuracy. However, inadvertent computerized transcription errors may be present.     [phone] (office/answering service)

## 2023-12-11 NOTE — PROGRESS NOTES
Physical Therapy  Facility/Department: [facility] ICU  Physical Therapy Initial Assessment    Name: Femi Branch  : 1925  MRN: [id number]  Date of Service: 2023  Chief Complaint   Patient presents with    Diarrhea     Patient states having brown watery diarrhea for two days. Patient states being taking to the hospital for constipation was giving a enema and been having diarrhea ever since          Discharge Recommendations:  Patient would benefit from continued therapy after discharge   PT Equipment Recommendations  Equipment Needed: No      Patient Diagnosis(es): There were no encounter diagnoses. Past Medical History:  has a past medical history of BPH (benign prostatic hyperplasia), Cataracts, bilateral, COPD (chronic obstructive pulmonary disease) ([address]), Former smoker, Hearing loss, History of intermittent claudication, Hyperlipidemia, Intestinal infection due to Clostridium difficile, OA (osteoarthritis) of knee, Pneumonia, Prediabetes, and Spinal stenosis. Past Surgical History:  has a past surgical history that includes Coronary artery bypass graft (); Eye surgery (); knee surgery (Bilateral); and Tonsillectomy (). Assessment   Body Structures, Functions, Activity Limitations Requiring Skilled Therapeutic Intervention: Decreased functional mobility ; Decreased safe awareness;Decreased endurance;Decreased balance;Decreased high-level IADLs; Increased pain;Decreased posture  Assessment: Pt normally lives in independent living at [facility] with his wife and walks with RW. Currently, was SBA out of bed using R bedrail and CGA transfers with RW. He walked 40ft with RW and CGA for balance but min A line management of O2 at 4L/M with cues/education. He had prior hospital stay with discharge home 23 although therapy recommended further therapy.  Pt had safety and balance issues with poor insight especially with toileting and dressing at that time, and now pt has O2 line

## 2023-12-11 NOTE — PROGRESS NOTES
Occupational Therapy  Facility/Department: [facility] [address] [facility] ICU  Occupational Therapy Initial Assessment    Name: Cam Delgado  : 1925  MRN: [id number]  Date of Service: 2023    Chief Complaint   Patient presents with    Diarrhea     Patient states having brown watery diarrhea for two days. Patient states being taking to the hospital for constipation was giving a enema and been having diarrhea ever since      Discharge Recommendations:  Pt would benefit from continued therapy services following discharge    OT Equipment Recommendations  Equipment Needed:  (AE/DME recommendations TBD)     Patient Diagnosis(es): There were no encounter diagnoses. Past Medical History:  has a past medical history of BPH (benign prostatic hyperplasia), Cataracts, bilateral, COPD (chronic obstructive pulmonary disease) ([address]), Former smoker, Hearing loss, History of intermittent claudication, Hyperlipidemia, Intestinal infection due to Clostridium difficile, OA (osteoarthritis) of knee, Pneumonia, Prediabetes, and Spinal stenosis. Past Surgical History:  has a past surgical history that includes Coronary artery bypass graft (); Eye surgery (); knee surgery (Bilateral); and Tonsillectomy (). Assessment   Performance deficits / Impairments: Decreased functional mobility ; Decreased safe awareness;Decreased balance;Decreased ADL status; Decreased cognition;Decreased posture;Decreased endurance;Decreased strength    Assessment: Pt seen for OT eval s/p admission due to bouts of diarrhea at home. Per ER MD note \"Found to have low Oxygen sats. Ct done which shows left bronchus occluded with likely aspirate. \"  Per pt report pt lives in [address] [place] [address] living with wife and is IND for ADLs and does drive (receives assist for additional IADLs) with use of RW for mobility. Pt currently requires MOD A bed mobility, MIN A sit<>stand, MOD A LB dressing, MOD A UB dressing and MAX A toileting.  Pt reporting increased fatigue this ADL  AM-PAC Daily Activity - Inpatient   How much help is needed for putting on and taking off regular lower body clothing?: A Lot  How much help is needed for bathing (which includes washing, rinsing, drying)?: A Lot  How much help is needed for toileting (which includes using toilet, bedpan, or urinal)?: A Lot  How much help is needed for putting on and taking off regular upper body clothing?: A Lot  How much help is needed for taking care of personal grooming?: A Little  How much help for eating meals?: A Little  AM-[facility] Inpatient Daily Activity Raw Score: 14  AM-PAC Inpatient ADL T-Scale Score : 33.39  ADL Inpatient CMS 0-100% Score: 59.67  ADL Inpatient CMS G-Code Modifier : CK    Goals  Short Term Goals  Time Frame for Short Term Goals: 14 visits  Short Term Goal 1: Pt to complete all ADL transfers at MOD I with use of LRAD  Short Term Goal 2: Pt to complete toileting at MOD I  Short Term Goal 3: Pt to complete standing ADL for >10 mins with no LOB and no rest break  Short Term Goal 4: Pt to be IND with BUE HEP to support improved functional strength and endurance needed for ADLs and ADL transfers    Therapy Time   Individual Concurrent Group Co-treatment   Time In 0920         Time Out 0948         Minutes 28         Timed Code Treatment Minutes: [address]

## 2023-12-12 ENCOUNTER — APPOINTMENT (OUTPATIENT)
Dept: GENERAL RADIOLOGY | Age: 88
DRG: 177 | End: 2023-12-12
Payer: COMMERCIAL

## 2023-12-12 PROBLEM — I50.21 ACUTE SYSTOLIC CONGESTIVE HEART FAILURE (HCC): Status: ACTIVE | Noted: 2023-12-12

## 2023-12-12 LAB
ANION GAP SERPL CALCULATED.3IONS-SCNC: 10 MMOL/L (ref 9–17)
BNP SERPL-MCNC: 5471 PG/ML
BUN SERPL-MCNC: 17 MG/DL (ref 8–23)
CALCIUM SERPL-MCNC: 8.2 MG/DL (ref 8.6–10.4)
CHLORIDE SERPL-SCNC: 98 MMOL/L (ref 98–107)
CO2 SERPL-SCNC: 32 MMOL/L (ref 20–31)
CREAT SERPL-MCNC: 1.1 MG/DL (ref 0.7–1.2)
EKG ATRIAL RATE: 67 BPM
EKG P-R INTERVAL: 440 MS
EKG Q-T INTERVAL: 438 MS
EKG QRS DURATION: 126 MS
EKG QTC CALCULATION (BAZETT): 462 MS
EKG R AXIS: 29 DEGREES
EKG T AXIS: 76 DEGREES
EKG VENTRICULAR RATE: 67 BPM
ERYTHROCYTE [DISTWIDTH] IN BLOOD BY AUTOMATED COUNT: 16.7 % (ref 12.5–15.4)
GFR SERPL CREATININE-BSD FRML MDRD: >60 ML/MIN/1.73M2
GLUCOSE SERPL-MCNC: 87 MG/DL (ref 70–99)
HCT VFR BLD AUTO: 35.2 % (ref 41–53)
HGB BLD-MCNC: 11.9 G/DL (ref 13.5–17.5)
INR PPP: 1.1
MCH RBC QN AUTO: 31.2 PG (ref 26–34)
MCHC RBC AUTO-ENTMCNC: 33.7 G/DL (ref 31–37)
MCV RBC AUTO: 92.6 FL (ref 80–100)
PLATELET # BLD AUTO: 195 K/UL (ref 140–450)
PMV BLD AUTO: 10.5 FL (ref 6–12)
POTASSIUM SERPL-SCNC: 3.6 MMOL/L (ref 3.7–5.3)
PROCALCITONIN SERPL-MCNC: 0.11 NG/ML
PROTHROMBIN TIME: 11.5 SEC (ref 9.4–12.6)
RBC # BLD AUTO: 3.8 M/UL (ref 4.5–5.9)
SODIUM SERPL-SCNC: 140 MMOL/L (ref 135–144)
WBC OTHER # BLD: 8.7 K/UL (ref 3.5–11)

## 2023-12-12 PROCEDURE — 1210000000 HC MED SURG R&B

## 2023-12-12 PROCEDURE — 94761 N-INVAS EAR/PLS OXIMETRY MLT: CPT

## 2023-12-12 PROCEDURE — 94668 MNPJ CHEST WALL SBSQ: CPT

## 2023-12-12 PROCEDURE — 84145 PROCALCITONIN (PCT): CPT

## 2023-12-12 PROCEDURE — 6370000000 HC RX 637 (ALT 250 FOR IP)

## 2023-12-12 PROCEDURE — 85610 PROTHROMBIN TIME: CPT

## 2023-12-12 PROCEDURE — 6370000000 HC RX 637 (ALT 250 FOR IP): Performed by: STUDENT IN AN ORGANIZED HEALTH CARE EDUCATION/TRAINING PROGRAM

## 2023-12-12 PROCEDURE — 71045 X-RAY EXAM CHEST 1 VIEW: CPT

## 2023-12-12 PROCEDURE — 80048 BASIC METABOLIC PNL TOTAL CA: CPT

## 2023-12-12 PROCEDURE — 83880 ASSAY OF NATRIURETIC PEPTIDE: CPT

## 2023-12-12 PROCEDURE — 6360000002 HC RX W HCPCS: Performed by: NURSE PRACTITIONER

## 2023-12-12 PROCEDURE — 87205 SMEAR GRAM STAIN: CPT

## 2023-12-12 PROCEDURE — 2580000003 HC RX 258: Performed by: NURSE PRACTITIONER

## 2023-12-12 PROCEDURE — 36415 COLL VENOUS BLD VENIPUNCTURE: CPT

## 2023-12-12 PROCEDURE — 85027 COMPLETE CBC AUTOMATED: CPT

## 2023-12-12 PROCEDURE — 94640 AIRWAY INHALATION TREATMENT: CPT

## 2023-12-12 PROCEDURE — 2580000003 HC RX 258

## 2023-12-12 PROCEDURE — 97116 GAIT TRAINING THERAPY: CPT

## 2023-12-12 PROCEDURE — 6360000002 HC RX W HCPCS

## 2023-12-12 PROCEDURE — 99232 SBSQ HOSP IP/OBS MODERATE 35: CPT | Performed by: FAMILY MEDICINE

## 2023-12-12 PROCEDURE — 2700000000 HC OXYGEN THERAPY PER DAY

## 2023-12-12 PROCEDURE — 87070 CULTURE OTHR SPECIMN AEROBIC: CPT

## 2023-12-12 RX ADMIN — PIPERACILLIN AND TAZOBACTAM 3375 MG: 3; .375 INJECTION, POWDER, LYOPHILIZED, FOR SOLUTION INTRAVENOUS at 08:08

## 2023-12-12 RX ADMIN — PIPERACILLIN AND TAZOBACTAM 3375 MG: 3; .375 INJECTION, POWDER, LYOPHILIZED, FOR SOLUTION INTRAVENOUS at 01:07

## 2023-12-12 RX ADMIN — ALBUTEROL SULFATE 2.5 MG: 2.5 SOLUTION RESPIRATORY (INHALATION) at 15:07

## 2023-12-12 RX ADMIN — ALBUTEROL SULFATE 2.5 MG: 2.5 SOLUTION RESPIRATORY (INHALATION) at 08:56

## 2023-12-12 RX ADMIN — AMIODARONE HYDROCHLORIDE 200 MG: 200 TABLET ORAL at 08:06

## 2023-12-12 RX ADMIN — ALBUTEROL SULFATE 2.5 MG: 2.5 SOLUTION RESPIRATORY (INHALATION) at 20:19

## 2023-12-12 RX ADMIN — HEPARIN SODIUM 5000 UNITS: 5000 INJECTION INTRAVENOUS; SUBCUTANEOUS at 08:05

## 2023-12-12 RX ADMIN — ASPIRIN 81 MG CHEWABLE TABLET 81 MG: 81 TABLET CHEWABLE at 08:05

## 2023-12-12 RX ADMIN — CEFTRIAXONE SODIUM 1000 MG: 1 INJECTION, POWDER, FOR SOLUTION INTRAMUSCULAR; INTRAVENOUS at 05:14

## 2023-12-12 RX ADMIN — METOPROLOL TARTRATE 25 MG: 25 TABLET, FILM COATED ORAL at 08:06

## 2023-12-12 RX ADMIN — AMIODARONE HYDROCHLORIDE 200 MG: 200 TABLET ORAL at 20:47

## 2023-12-12 RX ADMIN — FINASTERIDE 5 MG: 5 TABLET, FILM COATED ORAL at 08:06

## 2023-12-12 RX ADMIN — SODIUM CHLORIDE: 9 INJECTION, SOLUTION INTRAVENOUS at 22:31

## 2023-12-12 RX ADMIN — HEPARIN SODIUM 5000 UNITS: 5000 INJECTION INTRAVENOUS; SUBCUTANEOUS at 20:49

## 2023-12-12 RX ADMIN — METOPROLOL TARTRATE 25 MG: 25 TABLET, FILM COATED ORAL at 20:47

## 2023-12-12 RX ADMIN — PIPERACILLIN AND TAZOBACTAM 3375 MG: 3; .375 INJECTION, POWDER, LYOPHILIZED, FOR SOLUTION INTRAVENOUS at 23:19

## 2023-12-12 RX ADMIN — PIPERACILLIN AND TAZOBACTAM 3375 MG: 3; .375 INJECTION, POWDER, LYOPHILIZED, FOR SOLUTION INTRAVENOUS at 15:24

## 2023-12-12 NOTE — DISCHARGE INSTR - COC
Continuity of Care Form    Patient Name: Smitha Braun   :  1925  MRN:  [id number]    Admit date:  12/10/2023  Discharge date:  2023      Code Status Order: DNR-CCA   Advance Directives:     Admitting Physician:  Imer Lauren DO  PCP: Maye Brooks MD    Discharging Nurse: [facility] Unit/Room#: 707/762-36  Discharging Unit Phone Number: [phone]    Emergency Contact:   Extended Emergency Contact Information  Primary Emergency Contact: Ashley Leon  Home Phone: [phone]  Mobile Phone: [phone]  Relation: Spouse    Past Surgical History:  Past Surgical History:   Procedure Laterality Date    CORONARY ARTERY BYPASS GRAFT      EYE SURGERY      Cataracts    KNEE SURGERY Bilateral     TONSILLECTOMY         Immunization History:   Immunization History   Administered Date(s) Administered    COVID-19, PFIZER GRAY top, DO NOT Dilute, (age 15 y+), IM, 30 mcg/0.3 mL 2022    COVID-19, PFIZER PURPLE top, DILUTE for use, (age 15 y+), 30mcg/0.3mL 2021, 10/10/2021    Influenza, FLUAD, (age 72 y+), Adjuvanted, 0.5mL 2023    Influenza, FLUARIX, FLULAVAL, FLUZONE (age 10 mo+) AND AFLURIA, (age 1 y+), PF, 0.5mL 10/26/2022    Influenza, FLUZONE (age 72 y+), High Dose, 0.7mL 10/05/2020, 10/08/2021    Influenza, High Dose (Fluzone 65 yrs and older) 10/06/2017, 10/01/2018, 10/07/2019    Pneumococcal, PCV-13, PREVNAR 15, (age 6w+), IM, 0.5mL 2016, 2019    Zoster Live (Zostavax) 2015       Active Problems:  Patient Active Problem List   Diagnosis Code    Hyperlipidemia E78.5    Hypertensive heart and chronic kidney disease with heart failure (HCC) I13.0    Chronic combined systolic and diastolic congestive heart failure (HCC) I50.42    Pressure ulcer of sacral region, unspecified stage L89.159    OA (osteoarthritis) of knee M17.9    Cataracts, bilateral H26.9    Community acquired pneumonia of right lower lobe of lung J18.9    BPH (benign prostatic

## 2023-12-12 NOTE — PROGRESS NOTES
[facility]  Inpatient Service     Progress Note  2023    9:41 AM    Name:   Suhas Leon  MRN:     [id number]     Acct:      [id number]   Room:   Count includes the [facility] Day:  1  Admit Date:  12/10/2023 10:47 PM    PCP:   Marcello Elise MD  Code Status:  DNR-CCA    Subjective:     Overnight events: HR 55; Metoprolol was held     Pt appear to be laying down comfortably. Currently on 3L O2 NC. Patient reports he feels \"okay.\" Diarrhea has improved and no difficulty in breathing. Patient denies chest pain, palpitations, abdominal pain, n/v, or chills.     Medications:     Allergies:  No Known Allergies    Current Meds:   Scheduled Meds:    sodium chloride flush  5-40 mL IntraVENous 2 times per day    heparin (porcine)  5,000 Units SubCUTAneous BID    cefTRIAXone (ROCEPHIN) IV  1,000 mg IntraVENous Q24H    amiodarone  200 mg Oral BID    aspirin  81 mg Oral Daily    [Held by provider] furosemide  20 mg Oral Daily    metoprolol tartrate  25 mg Oral BID    finasteride  5 mg Oral Daily    piperacillin-tazobactam  3,375 mg IntraVENous Q8H    albuterol  2.5 mg Nebulization 4x Daily RT     Continuous Infusions:    sodium chloride      sodium chloride 90 mL/hr at 23 0508     PRN Meds: sodium chloride flush, sodium chloride, ondansetron **OR** ondansetron, polyethylene glycol, acetaminophen **OR** acetaminophen, albuterol    ROS:   ROS is negative except for points noted above.    Data:     Vitals:  BP (!) 179/81   Pulse 58   Temp 97.8 °F (36.6 °C) (Oral)   Resp 16   Ht 1.753 m (5' 9\")   Wt 49.9 kg (110 lb 0.2 oz)   SpO2 97%   BMI 16.25 kg/m²   Temp (24hrs), Av.1 °F (36.7 °C), Min:97.8 °F (36.6 °C), Max:98.4 °F (36.9 °C)    No results for input(s): \"POCGLU\" in the last 72 hours.    I/O (24Hr):    Intake/Output Summary (Last 24 hours) at 2023 0941  Last data filed at 2023 0727  Gross per 24 hour   Intake 1168.43 ml   Output --   Net 1168.43 ml  distress. Breath sounds: Wheezing, rhonchi and rales present. Chest:      Chest wall: No tenderness. Abdominal:      General: Abdomen is flat. Bowel sounds are normal. There is no distension. Palpations: Abdomen is soft. There is no mass. Tenderness: There is no abdominal tenderness. There is no guarding or rebound. Hernia: No hernia is present. Musculoskeletal:         General: No swelling, tenderness or deformity. Skin:     General: Skin is warm. Capillary Refill: Capillary refill takes less than 2 seconds. Coloration: Skin is not jaundiced or pale. Findings: No bruising or erythema. Neurological:      General: No focal deficit present. Mental Status: He is alert and oriented to person, place, and time. Psychiatric:         Mood and Affect: Mood normal.         Behavior: Behavior is cooperative.           Assessment:     Hospital Problems             Last Modified POA    * (Principal) Aspiration pneumonia of left upper lobe ([address]) 12/11/2023 Yes    Hyperlipidemia 12/11/2023 Yes    Hypertensive heart and chronic kidney disease with heart failure ([address]) 12/11/2023 Yes    Chronic combined systolic and diastolic congestive heart failure ([address]) 12/11/2023 Yes    Cataracts, bilateral 12/11/2023 Yes    BPH (benign prostatic hyperplasia) 12/11/2023 Yes    Former smoker 12/11/2023 Yes    Chronic renal disease, stage II 12/11/2023 Yes    Coronary artery disease involving coronary bypass graft of native heart 12/11/2023 Yes    Acute kidney injury superimposed on chronic kidney disease ([address]) 12/11/2023 Yes    Functional diarrhea 12/11/2023 Yes    Hypokalemia 12/11/2023 Yes    Acute hypoxic respiratory failure ([address]) 12/11/2023 Yes    Severe protein-calorie malnutrition ([address]) 12/11/2023 Yes     Patient is a 79 yo male with significant past medical history of BPH, COPD, intermittent claudication, HLD, osteoarthritis, prediabetes, and spinal stenosis who presented with complaints of diarrhea. Patient reports having diarrhea for 2 days, with recent Urgent Care visit on 12/1 due to constipation and was given an Enema. In the ED, patient became hypoxic on room air, saturating at 80% and was placed 5L O2 NC. CT chest pulmonary embolism reported negative for PE. New finding of complete occlusion of the left mainstem bronchus proximally extending well into inferior branches and proximally into mid and upper branches. Potentially represent aspirated material. Mild to moderate right and tiny left pleural effusions both associated atelectasis most pronounced at the bases. Pertinent labs showed hypokalemia 3.3 trending up (3.6), OWEN Cr 1.4, BUN 26, eGFR 45 now resolved, total protein 6.3, hypoalbuminemia 3.0, troponin elevated 31, lactic acid normal, and Hbg 12.0 now 11.9. ProBNP 5471. Diarrhea has been improving significantly. Currently on 3L O2 NC will continue weaning off and breathing treatments with vest therapy. Pulmonology considering possible thoracocentesis of the right lung but not urgent at the moment. Plan:     Continue IV Rocephin and Zosyn  FU Blood cultures   FU CXR   Continue vest therapy with breathing treatments q4h   Wean off O2 as tolerated   IVMF NS @ 90 mL/hr- avoid overhydration to prevent worsening of bilateral pleural effusions   I/O's and weight daily   Deferring from GI panel and testing for C.diff due to diarrheal improvement   Potassium replacement protocol   Pulmonology on board- FU on further recommendations   PT/OT on board   Continue home medications for CHF and BPH     Telemetry: NSR 60 pm with few PVCs  Anticoagulation: Heparin 5000 Units SubQ  Dispo: cheerapp   Diet: ADULT DIET; Dysphagia - Pureed;  Moderately Thick (Honey)    Patient was seen, evaluated and discussed with DO Girma Cano MD   Family Medicine Resident, PGY-1   12/12/2023  9:41 AM      Senior Resident Attestation:    I have independently seen Melissa Parker

## 2023-12-12 NOTE — PROGRESS NOTES
Physical Therapy  Facility/Department: [facility] SURG ICU  Physical Therapy Daily Documentation Note  Name: Kelli Shi  : 1925  MRN: [id number]  Date of Service: 2023    Discharge Recommendations:  Patient would benefit from continued therapy after discharge   PT Equipment Recommendations  Equipment Needed: No      Patient Diagnosis(es): There were no encounter diagnoses. Past Medical History:  has a past medical history of BPH (benign prostatic hyperplasia), Cataracts, bilateral, COPD (chronic obstructive pulmonary disease) ([address]), Former smoker, Hearing loss, History of intermittent claudication, Hyperlipidemia, Intestinal infection due to Clostridium difficile, OA (osteoarthritis) of knee, Pneumonia, Prediabetes, and Spinal stenosis. Past Surgical History:  has a past surgical history that includes Coronary artery bypass graft (); Eye surgery (); knee surgery (Bilateral); and Tonsillectomy (). Assessment   Body Structures, Functions, Activity Limitations Requiring Skilled Therapeutic Intervention: Decreased functional mobility ; Decreased safe awareness;Decreased endurance;Decreased balance;Decreased high-level IADLs; Increased pain;Decreased posture  Assessment: Pt normally lives in independent living at [facility] with his wife and walks with RW. Currently, was Payton/SBA out of bed using R bedrail and CGA/Payton transfers with RW. He walked 30ft+40ft+25ft+25ft with RW and CGA for balance but min A line management of O2 at 3 L/M with cues/education. He had prior hospital stay with discharge home 23 although therapy recommended further therapy. Pt had safety and balance issues with poor insight especially with toileting and dressing at that time, and now pt has O2 line management issues with fall risk for mobility. He is requiring more assist with toileting currently than prior.  Pt currently not safe to return to prior living arrangements and needs 24hr assist and falls in the past year or any fall with injury in the past year?: No  Receives Help From: Family (Spouse or staff)  ADL Assistance: Independent  Homemaking Assistance: Needs assistance (Facility cooks and cleans, wife does laundry)  Homemaking Responsibilities: No  Ambulation Assistance: Independent (Reports since being discharged back home following last admission, pt is MOD I for mobility with RW)  Transfer Assistance: Independent  Active : Yes  Mode of Transportation: Car  Occupation: Retired  Type of Occupation: [employer]: Spend time with wife  IADL Comments: Receives assist for majority of IADLs however does drive; wife reports pt was dressing himself except maybe assist for his long johns and was able to walk from 3rd floor apt. to dining room on first floor for dinner. Cognition   Orientation  Overall Orientation Status: Impaired  Orientation Level: Oriented to place;Oriented to situation;Oriented to person (Unable to report year however states it is December)  Cognition  Overall Cognitive Status: Exceptions  Arousal/Alertness: Appropriate responses to stimuli  Following Commands: Follows one step commands with repetition; Follows multistep commands with repitition; Follows one step commands with increased time; Follows multistep commands with increased time; Inconsistently follows commands  Attention Span: Difficulty dividing attention  Memory: Decreased short term memory  Safety Judgement: Decreased awareness of need for assistance;Decreased awareness of need for safety  Problem Solving: Assistance required to generate solutions;Assistance required to identify errors made;Assistance required to implement solutions;Assistance required to correct errors made;Decreased awareness of errors  Insights: Decreased awareness of deficits  Initiation: Does not require cues  Sequencing: Requires cues for some     Objective                              Bed mobility  Rolling to Right:

## 2023-12-12 NOTE — PLAN OF CARE
Problem: Discharge Planning  Goal: Discharge to home or other facility with appropriate resources  Outcome: Progressing  Flowsheets (Taken 12/11/2023 0800 by TrainMiles delarosa, RN)  Discharge to home or other facility with appropriate resources:   Identify barriers to discharge with patient and caregiver   Arrange for needed discharge resources and transportation as appropriate   Identify discharge learning needs (meds, wound care, etc)   Refer to discharge planning if patient needs post-hospital services based on physician order or complex needs related to functional status, cognitive ability or social support system     Problem: Chronic Conditions and Co-morbidities  Goal: Patient's chronic conditions and co-morbidity symptoms are monitored and maintained or improved  Outcome: Progressing  Flowsheets (Taken 12/11/2023 0800 by Miles Pike, RN)  Care Plan - Patient's Chronic Conditions and Co-Morbidity Symptoms are Monitored and Maintained or Improved:   Monitor and assess patient's chronic conditions and comorbid symptoms for stability, deterioration, or improvement   Update acute care plan with appropriate goals if chronic or comorbid symptoms are exacerbated and prevent overall improvement and discharge   Collaborate with multidisciplinary team to address chronic and comorbid conditions and prevent exacerbation or deterioration     Problem: Safety - Adult  Goal: Free from fall injury  Outcome: Progressing  Flowsheets (Taken 12/12/2023 0451)  Free From Fall Injury: Instruct family/caregiver on patient safety     Problem: ABCDS Injury Assessment  Goal: Absence of physical injury  Outcome: Progressing  Flowsheets (Taken 12/12/2023 0451)  Absence of Physical Injury: Implement safety measures based on patient assessment     Problem: Skin/Tissue Integrity  Goal: Absence of new skin breakdown  Description: 1. Monitor for areas of redness and/or skin breakdown  2. Assess vascular access sites hourly  3.   Every 4-6 hours minimum:  Change oxygen saturation probe site  4. Every 4-6 hours:  If on nasal continuous positive airway pressure, respiratory therapy assess nares and determine need for appliance change or resting period.   Outcome: Progressing  Note: Turn q 2 hrs

## 2023-12-13 LAB
ANION GAP SERPL CALCULATED.3IONS-SCNC: 7 MMOL/L (ref 9–17)
BASOPHILS # BLD: 0 K/UL (ref 0–0.2)
BASOPHILS NFR BLD: 1 % (ref 0–2)
BUN SERPL-MCNC: 14 MG/DL (ref 8–23)
CALCIUM SERPL-MCNC: 8.1 MG/DL (ref 8.6–10.4)
CHLORIDE SERPL-SCNC: 98 MMOL/L (ref 98–107)
CO2 SERPL-SCNC: 33 MMOL/L (ref 20–31)
CREAT SERPL-MCNC: 1 MG/DL (ref 0.7–1.2)
EOSINOPHIL # BLD: 0.1 K/UL (ref 0–0.4)
EOSINOPHILS RELATIVE PERCENT: 2 % (ref 1–4)
ERYTHROCYTE [DISTWIDTH] IN BLOOD BY AUTOMATED COUNT: 16.5 % (ref 12.5–15.4)
GFR SERPL CREATININE-BSD FRML MDRD: >60 ML/MIN/1.73M2
GLUCOSE SERPL-MCNC: 107 MG/DL (ref 70–99)
HCT VFR BLD AUTO: 35.8 % (ref 41–53)
HGB BLD-MCNC: 12.1 G/DL (ref 13.5–17.5)
LYMPHOCYTES NFR BLD: 1.1 K/UL (ref 1–4.8)
LYMPHOCYTES RELATIVE PERCENT: 18 % (ref 24–44)
MAGNESIUM SERPL-MCNC: 1.8 MG/DL (ref 1.6–2.6)
MCH RBC QN AUTO: 31.1 PG (ref 26–34)
MCHC RBC AUTO-ENTMCNC: 33.7 G/DL (ref 31–37)
MCV RBC AUTO: 92.4 FL (ref 80–100)
MICROORGANISM SPEC CULT: NORMAL
MICROORGANISM/AGENT SPEC: NORMAL
MONOCYTES NFR BLD: 0.6 K/UL (ref 0.1–1.2)
MONOCYTES NFR BLD: 10 % (ref 2–11)
NEUTROPHILS NFR BLD: 69 % (ref 36–66)
NEUTS SEG NFR BLD: 4.1 K/UL (ref 1.8–7.7)
PLATELET # BLD AUTO: 194 K/UL (ref 140–450)
PMV BLD AUTO: 10.4 FL (ref 6–12)
POTASSIUM SERPL-SCNC: 3.5 MMOL/L (ref 3.7–5.3)
RBC # BLD AUTO: 3.88 M/UL (ref 4.5–5.9)
SODIUM SERPL-SCNC: 138 MMOL/L (ref 135–144)
SPECIMEN DESCRIPTION: NORMAL
WBC OTHER # BLD: 5.9 K/UL (ref 3.5–11)

## 2023-12-13 PROCEDURE — 6360000002 HC RX W HCPCS: Performed by: STUDENT IN AN ORGANIZED HEALTH CARE EDUCATION/TRAINING PROGRAM

## 2023-12-13 PROCEDURE — 94668 MNPJ CHEST WALL SBSQ: CPT

## 2023-12-13 PROCEDURE — 2580000003 HC RX 258: Performed by: NURSE PRACTITIONER

## 2023-12-13 PROCEDURE — 99232 SBSQ HOSP IP/OBS MODERATE 35: CPT | Performed by: FAMILY MEDICINE

## 2023-12-13 PROCEDURE — 1210000000 HC MED SURG R&B

## 2023-12-13 PROCEDURE — 83735 ASSAY OF MAGNESIUM: CPT

## 2023-12-13 PROCEDURE — 97116 GAIT TRAINING THERAPY: CPT

## 2023-12-13 PROCEDURE — 6360000002 HC RX W HCPCS: Performed by: NURSE PRACTITIONER

## 2023-12-13 PROCEDURE — 6370000000 HC RX 637 (ALT 250 FOR IP)

## 2023-12-13 PROCEDURE — 94640 AIRWAY INHALATION TREATMENT: CPT

## 2023-12-13 PROCEDURE — 2580000003 HC RX 258

## 2023-12-13 PROCEDURE — 97535 SELF CARE MNGMENT TRAINING: CPT

## 2023-12-13 PROCEDURE — 85025 COMPLETE CBC W/AUTO DIFF WBC: CPT

## 2023-12-13 PROCEDURE — 2700000000 HC OXYGEN THERAPY PER DAY

## 2023-12-13 PROCEDURE — 94760 N-INVAS EAR/PLS OXIMETRY 1: CPT

## 2023-12-13 PROCEDURE — 36415 COLL VENOUS BLD VENIPUNCTURE: CPT

## 2023-12-13 PROCEDURE — 6370000000 HC RX 637 (ALT 250 FOR IP): Performed by: STUDENT IN AN ORGANIZED HEALTH CARE EDUCATION/TRAINING PROGRAM

## 2023-12-13 PROCEDURE — 80048 BASIC METABOLIC PNL TOTAL CA: CPT

## 2023-12-13 PROCEDURE — 6360000002 HC RX W HCPCS

## 2023-12-13 PROCEDURE — 97530 THERAPEUTIC ACTIVITIES: CPT

## 2023-12-13 PROCEDURE — 97110 THERAPEUTIC EXERCISES: CPT

## 2023-12-13 RX ORDER — FUROSEMIDE 10 MG/ML
40 INJECTION INTRAMUSCULAR; INTRAVENOUS DAILY
Status: DISCONTINUED | OUTPATIENT
Start: 2023-12-13 | End: 2023-12-16 | Stop reason: HOSPADM

## 2023-12-13 RX ADMIN — ALBUTEROL SULFATE 2.5 MG: 2.5 SOLUTION RESPIRATORY (INHALATION) at 08:51

## 2023-12-13 RX ADMIN — ALBUTEROL SULFATE 2.5 MG: 2.5 SOLUTION RESPIRATORY (INHALATION) at 15:25

## 2023-12-13 RX ADMIN — CEFTRIAXONE SODIUM 1000 MG: 1 INJECTION, POWDER, FOR SOLUTION INTRAMUSCULAR; INTRAVENOUS at 03:41

## 2023-12-13 RX ADMIN — ALBUTEROL SULFATE 2.5 MG: 2.5 SOLUTION RESPIRATORY (INHALATION) at 12:04

## 2023-12-13 RX ADMIN — METOPROLOL TARTRATE 25 MG: 25 TABLET, FILM COATED ORAL at 20:45

## 2023-12-13 RX ADMIN — PIPERACILLIN AND TAZOBACTAM 3375 MG: 3; .375 INJECTION, POWDER, LYOPHILIZED, FOR SOLUTION INTRAVENOUS at 18:11

## 2023-12-13 RX ADMIN — HEPARIN SODIUM 5000 UNITS: 5000 INJECTION INTRAVENOUS; SUBCUTANEOUS at 20:45

## 2023-12-13 RX ADMIN — HEPARIN SODIUM 5000 UNITS: 5000 INJECTION INTRAVENOUS; SUBCUTANEOUS at 10:02

## 2023-12-13 RX ADMIN — FINASTERIDE 5 MG: 5 TABLET, FILM COATED ORAL at 10:02

## 2023-12-13 RX ADMIN — FUROSEMIDE 40 MG: 10 INJECTION, SOLUTION INTRAMUSCULAR; INTRAVENOUS at 13:49

## 2023-12-13 RX ADMIN — ASPIRIN 81 MG CHEWABLE TABLET 81 MG: 81 TABLET CHEWABLE at 10:03

## 2023-12-13 RX ADMIN — METOPROLOL TARTRATE 25 MG: 25 TABLET, FILM COATED ORAL at 10:02

## 2023-12-13 RX ADMIN — PIPERACILLIN AND TAZOBACTAM 3375 MG: 3; .375 INJECTION, POWDER, LYOPHILIZED, FOR SOLUTION INTRAVENOUS at 10:06

## 2023-12-13 RX ADMIN — ALBUTEROL SULFATE 2.5 MG: 2.5 SOLUTION RESPIRATORY (INHALATION) at 20:44

## 2023-12-13 RX ADMIN — SODIUM CHLORIDE, PRESERVATIVE FREE 10 ML: 5 INJECTION INTRAVENOUS at 10:02

## 2023-12-13 RX ADMIN — AMIODARONE HYDROCHLORIDE 200 MG: 200 TABLET ORAL at 10:02

## 2023-12-13 RX ADMIN — AMIODARONE HYDROCHLORIDE 200 MG: 200 TABLET ORAL at 20:45

## 2023-12-13 RX ADMIN — SODIUM CHLORIDE, PRESERVATIVE FREE 10 ML: 5 INJECTION INTRAVENOUS at 20:50

## 2023-12-13 NOTE — PROGRESS NOTES
Physical Therapy  Facility/Department: [address]  Physical Therapy Daily Progress Note    Name: Aj Montero  : 1925  MRN: [id number]  Date of Service: 2023    Discharge Recommendations:  Patient would benefit from continued therapy after discharge   PT Equipment Recommendations  Equipment Needed: No      Patient Diagnosis(es): There were no encounter diagnoses. Past Medical History:  has a past medical history of BPH (benign prostatic hyperplasia), Cataracts, bilateral, COPD (chronic obstructive pulmonary disease) ([address]), Former smoker, Hearing loss, History of intermittent claudication, Hyperlipidemia, Intestinal infection due to Clostridium difficile, OA (osteoarthritis) of knee, Pneumonia, Prediabetes, and Spinal stenosis. Past Surgical History:  has a past surgical history that includes Coronary artery bypass graft (); Eye surgery (); knee surgery (Bilateral); and Tonsillectomy (). Assessment   Body Structures, Functions, Activity Limitations Requiring Skilled Therapeutic Intervention: Decreased functional mobility ; Decreased safe awareness;Decreased endurance;Decreased balance;Decreased high-level IADLs; Increased pain;Decreased posture  Assessment: Pt normally lives in independent living at [facility] with his wife and walks with RW. Pt requires CGA for transfers and ambulated with RW 10' x 2 and 50' CGA; O2 at 2 L. Pt exhibits mild unsteadiness at times and fatigues quickly. Pt currently not safe to return to prior living arrangements and needs 24hr assist and continued therapy.   Therapy Prognosis: Good  Requires PT Follow-Up: Yes  Activity Tolerance  Activity Tolerance: Patient tolerated treatment well     Plan   Physical Therapy Plan  General Plan:  (5-6x/week)  Current Treatment Recommendations: Balance training, Functional mobility training, Transfer training, Endurance training, Therapeutic activities, Safety education & training, Patient/Caregiver education & training,

## 2023-12-13 NOTE — PROGRESS NOTES
[facility]  PROGRESS NOTE    Room # 334/334-01   Name: Santa Malone               Reason for visit: Routine    I visited the  patient and spouse . Admit Date & Time: 12/10/2023 10:47 PM    Assessment:  Santa Malone is a 80 y.o. male in the hospital because \"aspiration pneumonia\". Upon entering the room patient was lying in recliner chair. Pt's spouse was standing next to him. Patient shared about his spouse and expressed gratitude for her and for the support she provides him. Pt and spouse explained that they do not have any other family nearby. Spouse stated \"we out lived them all\" Patient and spouse both appeared to be coping. Pt and spouse invited prayer. Intervention:  I introduced myself and my title as  I offered space for patient  to express feelings, needs, and concerns and provided a ministry presence. This writer actively listened to patient and spouse and offered words of affirmation. This writer also offered a prayer for pt and spouse. Outcome:  Pt and spouse expressed gratitude for visit     Plan:  Chaplains will remain available to offer spiritual and emotional support as needed.     Electronically signed by Raul Milligan on 12/13/2023 at 8:26 AM.  [address] [place] [address] Drive        12/13/23 1980   Encounter Summary   Encounter Overview/Reason  Initial Encounter   Service Provided For: Patient and family together   Referral/Consult From: [facility]   Support System Spouse   Last Encounter  12/12/23   Complexity of Encounter Low   Begin Time 1430   End Time  1438   Total Time Calculated 8 min   Spiritual/Emotional needs   Type Spiritual Support   Assessment/Intervention/Outcome   Assessment Calm;Coping   Intervention Active listening;Sustaining Presence/Ministry of presence;Prayer (assurance of)/[place]   Outcome Engaged in conversation;Expressed Gratitude;Coping

## 2023-12-13 NOTE — PLAN OF CARE
Problem: Discharge Planning  Goal: Discharge to home or other facility with appropriate resources  Outcome: Progressing  Flowsheets (Taken 12/12/2023 2010)  Discharge to home or other facility with appropriate resources: Identify barriers to discharge with patient and caregiver     Problem: Chronic Conditions and Co-morbidities  Goal: Patient's chronic conditions and co-morbidity symptoms are monitored and maintained or improved  Outcome: Progressing  Flowsheets (Taken 12/12/2023 2010)  Care Plan - Patient's Chronic Conditions and Co-Morbidity Symptoms are Monitored and Maintained or Improved: Monitor and assess patient's chronic conditions and comorbid symptoms for stability, deterioration, or improvement     Problem: Safety - Adult  Goal: Free from fall injury  Outcome: Progressing     Problem: ABCDS Injury Assessment  Goal: Absence of physical injury  Outcome: Progressing     Problem: Skin/Tissue Integrity  Goal: Absence of new skin breakdown  Description: 1. Monitor for areas of redness and/or skin breakdown  2. Assess vascular access sites hourly  3. Every 4-6 hours minimum:  Change oxygen saturation probe site  4. Every 4-6 hours:  If on nasal continuous positive airway pressure, respiratory therapy assess nares and determine need for appliance change or resting period.   Outcome: Progressing

## 2023-12-13 NOTE — PLAN OF CARE
Problem: Respiratory - Adult  Goal: Achieves optimal ventilation and oxygenation  Flowsheets (Taken 12/13/2023 1533)  Achieves optimal ventilation and oxygenation: Assess for changes in respiratory status

## 2023-12-13 NOTE — PROGRESS NOTES
Occupational Therapy  Facility/Department: [address]  Occupational Therapy Daily Treatment Note    Name: Danna Mckenzie  : 1925  MRN: [id number]  Date of Service: 2023    Discharge Recommendations:  Patient would benefit from continued therapy after discharge    Patient Diagnosis(es): There were no encounter diagnoses. Past Medical History:  has a past medical history of BPH (benign prostatic hyperplasia), Cataracts, bilateral, COPD (chronic obstructive pulmonary disease) ([address]), Former smoker, Hearing loss, History of intermittent claudication, Hyperlipidemia, Intestinal infection due to Clostridium difficile, OA (osteoarthritis) of knee, Pneumonia, Prediabetes, and Spinal stenosis. Past Surgical History:  has a past surgical history that includes Coronary artery bypass graft (); Eye surgery (); knee surgery (Bilateral); and Tonsillectomy (). Assessment   Performance deficits / Impairments: Decreased functional mobility ; Decreased safe awareness;Decreased balance;Decreased ADL status; Decreased endurance;Decreased strength;Decreased coordination;Decreased posture  Assessment: Pt presents with decreased ADL status secondary to above noted deficits requiring assistance with functional tasks. Pt is most significantly limited by decreased balance and decreased safety awareness. Pt would benefit from continued skilled therapy while hospitalized to maximize pt's safety and independence with functional tasks. Pt would require 24hr assistance and continued skilled therapy after discharge. Prognosis: Good  Decision Making: Medium Complexity  REQUIRES OT FOLLOW-UP: Yes  Activity Tolerance  Activity Tolerance: Patient Tolerated treatment well      Safety Devices  Type of Devices: Call light within reach;Nurse notified;Gait belt; All fall risk precautions in place; Chair alarm in place; Left in chair  Restraints  Restraints Initially in Place: No    Plan   Occupational Therapy Plan  Times Per Week:

## 2023-12-14 LAB
ANION GAP SERPL CALCULATED.3IONS-SCNC: 10 MMOL/L (ref 9–17)
ANION GAP SERPL CALCULATED.3IONS-SCNC: 6 MMOL/L (ref 9–17)
BASOPHILS # BLD: 0.1 K/UL (ref 0–0.2)
BASOPHILS NFR BLD: 1 % (ref 0–2)
BUN SERPL-MCNC: 13 MG/DL (ref 8–23)
BUN SERPL-MCNC: 14 MG/DL (ref 8–23)
CALCIUM SERPL-MCNC: 7.8 MG/DL (ref 8.6–10.4)
CALCIUM SERPL-MCNC: 8 MG/DL (ref 8.6–10.4)
CHLORIDE SERPL-SCNC: 96 MMOL/L (ref 98–107)
CHLORIDE SERPL-SCNC: 99 MMOL/L (ref 98–107)
CO2 SERPL-SCNC: 33 MMOL/L (ref 20–31)
CO2 SERPL-SCNC: 36 MMOL/L (ref 20–31)
CREAT SERPL-MCNC: 1 MG/DL (ref 0.7–1.2)
CREAT SERPL-MCNC: 1.1 MG/DL (ref 0.7–1.2)
EOSINOPHIL # BLD: 0.1 K/UL (ref 0–0.4)
EOSINOPHILS RELATIVE PERCENT: 2 % (ref 1–4)
ERYTHROCYTE [DISTWIDTH] IN BLOOD BY AUTOMATED COUNT: 16.3 % (ref 12.5–15.4)
GFR SERPL CREATININE-BSD FRML MDRD: >60 ML/MIN/1.73M2
GFR SERPL CREATININE-BSD FRML MDRD: >60 ML/MIN/1.73M2
GLUCOSE SERPL-MCNC: 116 MG/DL (ref 70–99)
GLUCOSE SERPL-MCNC: 82 MG/DL (ref 70–99)
HCT VFR BLD AUTO: 35.8 % (ref 41–53)
HGB BLD-MCNC: 12 G/DL (ref 13.5–17.5)
LYMPHOCYTES NFR BLD: 2.4 K/UL (ref 1–4.8)
LYMPHOCYTES RELATIVE PERCENT: 39 % (ref 24–44)
MAGNESIUM SERPL-MCNC: 1.8 MG/DL (ref 1.6–2.6)
MCH RBC QN AUTO: 31 PG (ref 26–34)
MCHC RBC AUTO-ENTMCNC: 33.6 G/DL (ref 31–37)
MCV RBC AUTO: 92.2 FL (ref 80–100)
MONOCYTES NFR BLD: 0.7 K/UL (ref 0.1–1.2)
MONOCYTES NFR BLD: 11 % (ref 2–11)
NEUTROPHILS NFR BLD: 47 % (ref 36–66)
NEUTS SEG NFR BLD: 2.8 K/UL (ref 1.8–7.7)
PLATELET # BLD AUTO: 195 K/UL (ref 140–450)
PMV BLD AUTO: 9.8 FL (ref 6–12)
POTASSIUM SERPL-SCNC: 3 MMOL/L (ref 3.7–5.3)
POTASSIUM SERPL-SCNC: 4.2 MMOL/L (ref 3.7–5.3)
RBC # BLD AUTO: 3.89 M/UL (ref 4.5–5.9)
SODIUM SERPL-SCNC: 138 MMOL/L (ref 135–144)
SODIUM SERPL-SCNC: 142 MMOL/L (ref 135–144)
WBC OTHER # BLD: 6.1 K/UL (ref 3.5–11)

## 2023-12-14 PROCEDURE — 97110 THERAPEUTIC EXERCISES: CPT

## 2023-12-14 PROCEDURE — 2580000003 HC RX 258

## 2023-12-14 PROCEDURE — 97116 GAIT TRAINING THERAPY: CPT

## 2023-12-14 PROCEDURE — 6360000002 HC RX W HCPCS

## 2023-12-14 PROCEDURE — 6360000002 HC RX W HCPCS: Performed by: NURSE PRACTITIONER

## 2023-12-14 PROCEDURE — 36415 COLL VENOUS BLD VENIPUNCTURE: CPT

## 2023-12-14 PROCEDURE — 2580000003 HC RX 258: Performed by: NURSE PRACTITIONER

## 2023-12-14 PROCEDURE — 99232 SBSQ HOSP IP/OBS MODERATE 35: CPT | Performed by: FAMILY MEDICINE

## 2023-12-14 PROCEDURE — 6370000000 HC RX 637 (ALT 250 FOR IP): Performed by: STUDENT IN AN ORGANIZED HEALTH CARE EDUCATION/TRAINING PROGRAM

## 2023-12-14 PROCEDURE — 6360000002 HC RX W HCPCS: Performed by: STUDENT IN AN ORGANIZED HEALTH CARE EDUCATION/TRAINING PROGRAM

## 2023-12-14 PROCEDURE — 80048 BASIC METABOLIC PNL TOTAL CA: CPT

## 2023-12-14 PROCEDURE — 6370000000 HC RX 637 (ALT 250 FOR IP)

## 2023-12-14 PROCEDURE — 85025 COMPLETE CBC W/AUTO DIFF WBC: CPT

## 2023-12-14 PROCEDURE — 94761 N-INVAS EAR/PLS OXIMETRY MLT: CPT

## 2023-12-14 PROCEDURE — 94640 AIRWAY INHALATION TREATMENT: CPT

## 2023-12-14 PROCEDURE — 94668 MNPJ CHEST WALL SBSQ: CPT

## 2023-12-14 PROCEDURE — 83735 ASSAY OF MAGNESIUM: CPT

## 2023-12-14 PROCEDURE — 1210000000 HC MED SURG R&B

## 2023-12-14 PROCEDURE — 2700000000 HC OXYGEN THERAPY PER DAY

## 2023-12-14 RX ORDER — POTASSIUM CHLORIDE 20 MEQ/1
40 TABLET, EXTENDED RELEASE ORAL PRN
Status: DISCONTINUED | OUTPATIENT
Start: 2023-12-14 | End: 2023-12-16 | Stop reason: HOSPADM

## 2023-12-14 RX ORDER — ALBUTEROL SULFATE 2.5 MG/3ML
2.5 SOLUTION RESPIRATORY (INHALATION) EVERY 4 HOURS PRN
Status: DISCONTINUED | OUTPATIENT
Start: 2023-12-14 | End: 2023-12-16 | Stop reason: HOSPADM

## 2023-12-14 RX ORDER — POTASSIUM CHLORIDE 7.45 MG/ML
10 INJECTION INTRAVENOUS PRN
Status: DISCONTINUED | OUTPATIENT
Start: 2023-12-14 | End: 2023-12-16 | Stop reason: HOSPADM

## 2023-12-14 RX ADMIN — HEPARIN SODIUM 5000 UNITS: 5000 INJECTION INTRAVENOUS; SUBCUTANEOUS at 10:18

## 2023-12-14 RX ADMIN — AMIODARONE HYDROCHLORIDE 200 MG: 200 TABLET ORAL at 10:11

## 2023-12-14 RX ADMIN — CEFTRIAXONE SODIUM 1000 MG: 1 INJECTION, POWDER, FOR SOLUTION INTRAMUSCULAR; INTRAVENOUS at 03:40

## 2023-12-14 RX ADMIN — POTASSIUM CHLORIDE 10 MEQ: 7.46 INJECTION, SOLUTION INTRAVENOUS at 13:41

## 2023-12-14 RX ADMIN — ALBUTEROL SULFATE 2.5 MG: 2.5 SOLUTION RESPIRATORY (INHALATION) at 07:55

## 2023-12-14 RX ADMIN — METOPROLOL TARTRATE 25 MG: 25 TABLET, FILM COATED ORAL at 10:12

## 2023-12-14 RX ADMIN — FUROSEMIDE 40 MG: 10 INJECTION, SOLUTION INTRAMUSCULAR; INTRAVENOUS at 10:18

## 2023-12-14 RX ADMIN — METOPROLOL TARTRATE 25 MG: 25 TABLET, FILM COATED ORAL at 19:43

## 2023-12-14 RX ADMIN — POTASSIUM CHLORIDE 10 MEQ: 7.46 INJECTION, SOLUTION INTRAVENOUS at 15:58

## 2023-12-14 RX ADMIN — FINASTERIDE 5 MG: 5 TABLET, FILM COATED ORAL at 10:12

## 2023-12-14 RX ADMIN — HEPARIN SODIUM 5000 UNITS: 5000 INJECTION INTRAVENOUS; SUBCUTANEOUS at 19:44

## 2023-12-14 RX ADMIN — AMIODARONE HYDROCHLORIDE 200 MG: 200 TABLET ORAL at 19:43

## 2023-12-14 RX ADMIN — PIPERACILLIN AND TAZOBACTAM 3375 MG: 3; .375 INJECTION, POWDER, LYOPHILIZED, FOR SOLUTION INTRAVENOUS at 11:02

## 2023-12-14 RX ADMIN — SODIUM CHLORIDE, PRESERVATIVE FREE 10 ML: 5 INJECTION INTRAVENOUS at 10:19

## 2023-12-14 RX ADMIN — PIPERACILLIN AND TAZOBACTAM 3375 MG: 3; .375 INJECTION, POWDER, LYOPHILIZED, FOR SOLUTION INTRAVENOUS at 01:51

## 2023-12-14 RX ADMIN — PIPERACILLIN AND TAZOBACTAM 3375 MG: 3; .375 INJECTION, POWDER, LYOPHILIZED, FOR SOLUTION INTRAVENOUS at 19:22

## 2023-12-14 RX ADMIN — SODIUM CHLORIDE, PRESERVATIVE FREE 10 ML: 5 INJECTION INTRAVENOUS at 19:22

## 2023-12-14 RX ADMIN — POTASSIUM CHLORIDE 10 MEQ: 7.46 INJECTION, SOLUTION INTRAVENOUS at 17:21

## 2023-12-14 RX ADMIN — ASPIRIN 81 MG CHEWABLE TABLET 81 MG: 81 TABLET CHEWABLE at 10:12

## 2023-12-14 RX ADMIN — POTASSIUM CHLORIDE 10 MEQ: 7.46 INJECTION, SOLUTION INTRAVENOUS at 14:35

## 2023-12-14 RX ADMIN — POTASSIUM CHLORIDE 10 MEQ: 7.46 INJECTION, SOLUTION INTRAVENOUS at 12:38

## 2023-12-14 RX ADMIN — POTASSIUM CHLORIDE 10 MEQ: 7.46 INJECTION, SOLUTION INTRAVENOUS at 11:25

## 2023-12-14 RX ADMIN — SODIUM CHLORIDE: 9 INJECTION, SOLUTION INTRAVENOUS at 10:53

## 2023-12-14 NOTE — PLAN OF CARE
BRONCHOSPASM/BRONCHOCONSTRICTION     [x]         IMPROVE AERATION/BREATH SOUNDS  [x]   ADMINISTER BRONCHODILATOR THERAPY AS APPROPRIATE  [x]   ASSESS BREATH SOUNDS  [x]   IMPLEMENT AEROSOL/MDI PROTOCOL  [x]   PATIENT EDUCATION AS NEEDED   ATELECTASIS     [x]  PREVENT ATELECTASIS  [x]   ASSESS BREATH SOUNDS  [x]   IMPLEMENT HYPERINFLATION PROTOCOL  [x]  INCENTIVE SPIROMETRY INSTRUCTION  []   PATIENT EDUCATION AS NEEDED   PROVIDE ADEQUATE OXYGENATION WITH ACCEPTABLE SP02/ABG'S    [x]  IDENTIFY APPROPRIATE OXYGEN THERAPY  [x]   MONITOR SP02/ABG'S AS NEEDED   [x]   PATIENT EDUCATION AS NEEDED    CPT given . PC noted.     Problem: Respiratory - Adult  Goal: Achieves optimal ventilation and oxygenation  12/13/2023 2103 by Rose Marie Diaz RCP  Outcome: Progressing

## 2023-12-14 NOTE — PROGRESS NOTES
[facility] Residency  Inpatient Service     Progress Note  2023    9:02 AM    Name:   Suhas Leon  MRN:     [id number]     Acct:      [id number]   Room:   [address] [place] [address] Day:  3  Admit Date:  12/10/2023 10:47 PM    PCP:   Marcello Elise MD  Code Status:  DNR-CCA    Subjective:     Overnight events: none    Patient appeared to be laying down comfortably and currently on 2L O2 NC. Patient reports he is feeling relatively well. Denies chest pain, palpitations, SOB, chills or n/v.     Medications:     Allergies:  No Known Allergies    Current Meds:   Scheduled Meds:    furosemide  40 mg IntraVENous Daily    sodium chloride flush  5-40 mL IntraVENous 2 times per day    heparin (porcine)  5,000 Units SubCUTAneous BID    cefTRIAXone (ROCEPHIN) IV  1,000 mg IntraVENous Q24H    amiodarone  200 mg Oral BID    aspirin  81 mg Oral Daily    [Held by provider] furosemide  20 mg Oral Daily    metoprolol tartrate  25 mg Oral BID    finasteride  5 mg Oral Daily    piperacillin-tazobactam  3,375 mg IntraVENous Q8H    albuterol  2.5 mg Nebulization 4x Daily RT     Continuous Infusions:    sodium chloride       PRN Meds: sodium chloride flush, sodium chloride, ondansetron **OR** ondansetron, polyethylene glycol, acetaminophen **OR** acetaminophen, albuterol    ROS:   ROS is negative except for points noted above.    Data:     Vitals:  BP (!) 163/67   Pulse 74   Temp 97.3 °F (36.3 °C) (Oral)   Resp 18   Ht 1.753 m (5' 9\")   Wt 54.5 kg (120 lb 2.4 oz)   SpO2 96%   BMI 17.74 kg/m²   Temp (24hrs), Av.6 °F (36.4 °C), Min:97.3 °F (36.3 °C), Max:98.2 °F (36.8 °C)    No results for input(s): \"POCGLU\" in the last 72 hours.    I/O (24Hr):    Intake/Output Summary (Last 24 hours) at 2023 0902  Last data filed at 2023 0656  Gross per 24 hour   Intake 245 ml   Output 1000 ml   Net -755 ml         Labs:  Hematology:  Recent Labs     23  0637 23  0849  sounds are normal. There is no distension. Palpations: Abdomen is soft. There is no mass. Tenderness: There is no abdominal tenderness. There is no guarding or rebound. Hernia: No hernia is present. Musculoskeletal:         General: No swelling, tenderness or deformity. Skin:     General: Skin is warm. Capillary Refill: Capillary refill takes less than 2 seconds. Coloration: Skin is not jaundiced or pale. Findings: No bruising or erythema. Neurological:      General: No focal deficit present. Mental Status: He is alert and oriented to person, place, and time. Psychiatric:         Mood and Affect: Mood normal.         Behavior: Behavior is cooperative. 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 ([address]) 12/11/2023 Yes    Chronic combined systolic and diastolic congestive heart failure ([address]) 12/11/2023 Yes    Cataracts, bilateral 12/11/2023 Yes    BPH (benign prostatic hyperplasia) 12/11/2023 Yes    Former smoker 12/11/2023 Yes    Chronic renal disease, stage II 12/11/2023 Yes    Coronary artery disease involving coronary bypass graft of native heart 12/11/2023 Yes     Patient is a 81 yo male with significant past medical history of BPH, COPD, intermittent claudication, HLD, osteoarthritis, prediabetes, and spinal stenosis who presented with complaints of diarrhea. Patient reports having diarrhea for 2 days, with recent Urgent Care visit on 12/1 due to constipation and was given an Enema.  In the ED, patient became hypoxic on room air, saturating at 80% and was placed 5L O2 NC. CT chest pulmonary embolism reported negative for PE. New finding of complete occlusion of the left mainstem bronchus proximally extending well into inferior branches and proximally into mid and upper branches. Potentially represent aspirated material. Mild to moderate right and tiny left pleural effusions both associated atelectasis most pronounced at the bases. Pertinent labs showed hypokalemia 3.5 trending down (3.0), OWEN Cr 1.4, BUN 26, eGFR 45 now resolved, total protein 6.3, hypoalbuminemia 3.0, troponin elevated 31, lactic acid normal, and Hbg 12.0 now 12.1. ProBNP 5471. Diarrhea has been improving significantly. Currently on 2L O2 NC will continue weaning off and breathing treatments with vest therapy. Wheezing and crackles more prominent on the left upper lobe but is diffuse. Discontinued IV fluids and started diuresis. Plan:     Continue IV Zosyn and will DC rocephin  FU Blood cultures   Continue vest therapy with breathing treatments q4h   Wean off O2 as tolerated   Discontinue IVMF, started diuresis- IV Lasix 40 mg   Encourage to sitting up right, up on chair, and using flutter valve   I/O's and weight daily   Potassium replacement protocol   Pulmonology on board- FU on further recommendations   PT/OT on board   Continue home medications for CHF and BPH     Telemetry: sinus atilio 57 bpm  Anticoagulation: Heparin 5000 Units SubQ  Dispo: [email or web address]   Diet: ADULT DIET; Dysphagia - Pureed; Moderately Thick (Honey)    Patient was seen, evaluated and discussed with DO Gaetano Munoz MD   Family Medicine Resident, PGY-1   12/14/2023  9:02 AM      Senior Resident Attestation:    I have independently seen Michelle Serrato and discussed the assessment and plan with the intern listed above and the attending Loly Esquivel DO.  I have reviewed the note and have included any edits or additional information

## 2023-12-14 NOTE — PROGRESS NOTES
Treatment Recommendations: Balance training, Functional mobility training, Transfer training, Endurance training, Therapeutic activities, Safety education & training, Patient/Caregiver education & training, Gait training  Safety Devices  Type of Devices: Call light within reach, Nurse notified, Gait belt, All fall risk precautions in place, Chair alarm in place, Left in chair  Restraints  Restraints Initially in Place: No     Restrictions  Restrictions/Precautions  Restrictions/Precautions: Up as Tolerated, Fall Risk  Required Braces or Orthoses?: No  Implants present? : Metal implants  Position Activity Restriction  Other position/activity restrictions: Up with assistance     Subjective   General  Patient assessed for rehabilitation services?: Yes  Response To Previous Treatment: Patient with no complaints from previous session.  Family / Caregiver Present: No  Subjective  Subjective: RN & pt agreeable to PT. Pt supine in bed with HOB elevated upon arrival. Pt denies pain. Pt reports incontinent of bowel. When pt seated in recliner after first walk & seated ex', pt report need to go to bathroom again.                Cognition   Cognition  Overall Cognitive Status: Exceptions  Arousal/Alertness: Appropriate responses to stimuli  Following Commands: Follows multistep commands with increased time;Follows multistep commands with repitition;Inconsistently follows commands  Attention Span: Difficulty dividing attention  Memory: Decreased recall of precautions  Safety Judgement: Decreased awareness of need for safety;Decreased awareness of need for assistance  Problem Solving: Assistance required to correct errors made;Assistance required to generate solutions;Assistance required to identify errors made;Assistance required to implement solutions;Decreased awareness of errors  Insights: Decreased awareness of deficits  Initiation: Requires cues for some  Sequencing: Requires cues for some     Objective                     Bed mobility  Rolling to Right: Stand by assistance  Supine to Sit: Stand by assistance  Scooting: Stand by assistance  Bed Mobility Comments: dependent for hygiene care with new brief prior to mobility; HOB minimally elevated and R bedrail  Transfers  Sit to Stand: Contact guard assistance  Stand to Sit: Contact guard assistance  Comment: Transfers with RW. Verbal cues for hand placement; bed, recliner and toilet with dangelo grab bars; decreased safety at toilet with urgency with decreased awareness of lines and aligning to toilet; assist brief and hygiene care at toilet  Ambulation  Surface: Level tile  Device: Rolling Walker  Other Apparatus: O2 (2 L O2; IV)  Assistance: Contact guard assistance  Quality of Gait: fair alexa; decreased safety awareness of lines; decreased safety when urgency to toilet  Gait Deviations: Decreased step length;Decreased step height  Distance: 60ft with O2 tank then 25+25ft  Comments: decreased safety turning with RW in brun[place] and decreased safety turning at toilet with fall risk require max cues  More Ambulation?: No  Stairs/Curb  Stairs?: No             Seated LE exercise program: Long Arc Quads, hip abduction/adduction, heel/toe raises, and marches.  Reps: 10; min cues                                                    AM-PAC - Mobility    AM-PAC Basic Mobility - Inpatient   How much help is needed turning from your back to your side while in a flat bed without using bedrails?: A Little  How much help is needed moving from lying on your back to sitting on the side of a flat bed without using bedrails?: A Little  How much help is needed moving to and from a bed to a chair?: A Little  How much help is needed standing up from a chair using your arms?: A Little  How much help is needed walking in hospital room?: A Little  How much help is needed climbing 3-5 steps with a railing?: Total  AM-PAC Inpatient Mobility Raw Score : 16  AM-PAC Inpatient T-Scale Score : 40.78  Mobility

## 2023-12-14 NOTE — PLAN OF CARE
Problem: Discharge Planning  Goal: Discharge to home or other facility with appropriate resources  Outcome: Progressing  Flowsheets  Taken 12/14/2023 1600  Discharge to home or other facility with appropriate resources:   Identify barriers to discharge with patient and caregiver   Arrange for needed discharge resources and transportation as appropriate   Identify discharge learning needs (meds, wound care, etc)  Taken 12/14/2023 1200  Discharge to home or other facility with appropriate resources:   Identify barriers to discharge with patient and caregiver   Arrange for needed discharge resources and transportation as appropriate   Identify discharge learning needs (meds, wound care, etc)  Taken 12/14/2023 0830  Discharge to home or other facility with appropriate resources:   Identify barriers to discharge with patient and caregiver   Arrange for needed discharge resources and transportation as appropriate   Identify discharge learning needs (meds, wound care, etc)     Problem: Chronic Conditions and Co-morbidities  Goal: Patient's chronic conditions and co-morbidity symptoms are monitored and maintained or improved  Outcome: Progressing  Flowsheets  Taken 12/14/2023 1600  Care Plan - Patient's Chronic Conditions and Co-Morbidity Symptoms are Monitored and Maintained or Improved: Monitor and assess patient's chronic conditions and comorbid symptoms for stability, deterioration, or improvement  Taken 12/14/2023 1200  Care Plan - Patient's Chronic Conditions and Co-Morbidity Symptoms are Monitored and Maintained or Improved: Monitor and assess patient's chronic conditions and comorbid symptoms for stability, deterioration, or improvement  Taken 12/14/2023 0830  Care Plan - Patient's Chronic Conditions and Co-Morbidity Symptoms are Monitored and Maintained or Improved: Monitor and assess patient's chronic conditions and comorbid symptoms for stability, deterioration, or improvement     Problem: Safety - Adult  Goal:

## 2023-12-15 ENCOUNTER — APPOINTMENT (OUTPATIENT)
Dept: GENERAL RADIOLOGY | Age: 88
DRG: 177 | End: 2023-12-15
Payer: COMMERCIAL

## 2023-12-15 LAB
ANION GAP SERPL CALCULATED.3IONS-SCNC: 8 MMOL/L (ref 9–17)
BASOPHILS # BLD: 0 K/UL (ref 0–0.2)
BASOPHILS NFR BLD: 1 % (ref 0–2)
BUN SERPL-MCNC: 13 MG/DL (ref 8–23)
CALCIUM SERPL-MCNC: 7.9 MG/DL (ref 8.6–10.4)
CHLORIDE SERPL-SCNC: 98 MMOL/L (ref 98–107)
CO2 SERPL-SCNC: 33 MMOL/L (ref 20–31)
CREAT SERPL-MCNC: 1 MG/DL (ref 0.7–1.2)
EOSINOPHIL # BLD: 0.2 K/UL (ref 0–0.4)
EOSINOPHILS RELATIVE PERCENT: 4 % (ref 1–4)
ERYTHROCYTE [DISTWIDTH] IN BLOOD BY AUTOMATED COUNT: 16.1 % (ref 12.5–15.4)
GFR SERPL CREATININE-BSD FRML MDRD: >60 ML/MIN/1.73M2
GLUCOSE SERPL-MCNC: 86 MG/DL (ref 70–99)
HCT VFR BLD AUTO: 34.2 % (ref 41–53)
HGB BLD-MCNC: 11.5 G/DL (ref 13.5–17.5)
LYMPHOCYTES NFR BLD: 1.8 K/UL (ref 1–4.8)
LYMPHOCYTES RELATIVE PERCENT: 35 % (ref 24–44)
MCH RBC QN AUTO: 30.8 PG (ref 26–34)
MCHC RBC AUTO-ENTMCNC: 33.5 G/DL (ref 31–37)
MCV RBC AUTO: 92 FL (ref 80–100)
MONOCYTES NFR BLD: 0.6 K/UL (ref 0.1–1.2)
MONOCYTES NFR BLD: 12 % (ref 2–11)
NEUTROPHILS NFR BLD: 48 % (ref 36–66)
NEUTS SEG NFR BLD: 2.5 K/UL (ref 1.8–7.7)
PLATELET # BLD AUTO: 192 K/UL (ref 140–450)
PMV BLD AUTO: 10 FL (ref 6–12)
POTASSIUM SERPL-SCNC: 3.7 MMOL/L (ref 3.7–5.3)
RBC # BLD AUTO: 3.72 M/UL (ref 4.5–5.9)
SODIUM SERPL-SCNC: 139 MMOL/L (ref 135–144)
WBC OTHER # BLD: 5.1 K/UL (ref 3.5–11)

## 2023-12-15 PROCEDURE — 6360000002 HC RX W HCPCS: Performed by: STUDENT IN AN ORGANIZED HEALTH CARE EDUCATION/TRAINING PROGRAM

## 2023-12-15 PROCEDURE — 2580000003 HC RX 258: Performed by: NURSE PRACTITIONER

## 2023-12-15 PROCEDURE — 97110 THERAPEUTIC EXERCISES: CPT

## 2023-12-15 PROCEDURE — 6360000002 HC RX W HCPCS: Performed by: NURSE PRACTITIONER

## 2023-12-15 PROCEDURE — 2580000003 HC RX 258

## 2023-12-15 PROCEDURE — 1210000000 HC MED SURG R&B

## 2023-12-15 PROCEDURE — 97116 GAIT TRAINING THERAPY: CPT

## 2023-12-15 PROCEDURE — 6370000000 HC RX 637 (ALT 250 FOR IP): Performed by: STUDENT IN AN ORGANIZED HEALTH CARE EDUCATION/TRAINING PROGRAM

## 2023-12-15 PROCEDURE — 80048 BASIC METABOLIC PNL TOTAL CA: CPT

## 2023-12-15 PROCEDURE — 97535 SELF CARE MNGMENT TRAINING: CPT

## 2023-12-15 PROCEDURE — 97530 THERAPEUTIC ACTIVITIES: CPT

## 2023-12-15 PROCEDURE — 6370000000 HC RX 637 (ALT 250 FOR IP)

## 2023-12-15 PROCEDURE — 36415 COLL VENOUS BLD VENIPUNCTURE: CPT

## 2023-12-15 PROCEDURE — 99232 SBSQ HOSP IP/OBS MODERATE 35: CPT | Performed by: FAMILY MEDICINE

## 2023-12-15 PROCEDURE — 85025 COMPLETE CBC W/AUTO DIFF WBC: CPT

## 2023-12-15 PROCEDURE — 6360000002 HC RX W HCPCS

## 2023-12-15 PROCEDURE — 71045 X-RAY EXAM CHEST 1 VIEW: CPT

## 2023-12-15 RX ADMIN — AMIODARONE HYDROCHLORIDE 200 MG: 200 TABLET ORAL at 09:01

## 2023-12-15 RX ADMIN — HEPARIN SODIUM 5000 UNITS: 5000 INJECTION INTRAVENOUS; SUBCUTANEOUS at 09:01

## 2023-12-15 RX ADMIN — PIPERACILLIN AND TAZOBACTAM 3375 MG: 3; .375 INJECTION, POWDER, LYOPHILIZED, FOR SOLUTION INTRAVENOUS at 11:35

## 2023-12-15 RX ADMIN — FINASTERIDE 5 MG: 5 TABLET, FILM COATED ORAL at 09:01

## 2023-12-15 RX ADMIN — PIPERACILLIN AND TAZOBACTAM 3375 MG: 3; .375 INJECTION, POWDER, LYOPHILIZED, FOR SOLUTION INTRAVENOUS at 02:37

## 2023-12-15 RX ADMIN — SODIUM CHLORIDE, PRESERVATIVE FREE 10 ML: 5 INJECTION INTRAVENOUS at 21:00

## 2023-12-15 RX ADMIN — AMIODARONE HYDROCHLORIDE 200 MG: 200 TABLET ORAL at 21:00

## 2023-12-15 RX ADMIN — ASPIRIN 81 MG CHEWABLE TABLET 81 MG: 81 TABLET CHEWABLE at 09:01

## 2023-12-15 RX ADMIN — HEPARIN SODIUM 5000 UNITS: 5000 INJECTION INTRAVENOUS; SUBCUTANEOUS at 21:00

## 2023-12-15 RX ADMIN — METOPROLOL TARTRATE 25 MG: 25 TABLET, FILM COATED ORAL at 09:01

## 2023-12-15 RX ADMIN — FUROSEMIDE 40 MG: 10 INJECTION, SOLUTION INTRAMUSCULAR; INTRAVENOUS at 09:02

## 2023-12-15 RX ADMIN — PIPERACILLIN AND TAZOBACTAM 3375 MG: 3; .375 INJECTION, POWDER, LYOPHILIZED, FOR SOLUTION INTRAVENOUS at 18:41

## 2023-12-15 NOTE — PROGRESS NOTES
Physical Therapy  Facility/Department: [address]  Physical Therapy Daily Treatment Note    Name: Bethanie Serrano  : 1925  MRN: [id number]  Date of Service: 12/15/2023    Discharge Recommendations:  Patient would benefit from continued therapy after discharge   PT Equipment Recommendations  Equipment Needed: No      Patient Diagnosis(es): There were no encounter diagnoses. Past Medical History:  has a past medical history of BPH (benign prostatic hyperplasia), Cataracts, bilateral, COPD (chronic obstructive pulmonary disease) ([address]), Former smoker, Hearing loss, History of intermittent claudication, Hyperlipidemia, Intestinal infection due to Clostridium difficile, OA (osteoarthritis) of knee, Pneumonia, Prediabetes, and Spinal stenosis. Past Surgical History:  has a past surgical history that includes Coronary artery bypass graft (); Eye surgery (); knee surgery (Bilateral); and Tonsillectomy (). Assessment   Body Structures, Functions, Activity Limitations Requiring Skilled Therapeutic Intervention: Decreased functional mobility ; Decreased safe awareness;Decreased endurance;Decreased balance;Decreased high-level IADLs; Increased pain;Decreased posture    Assessment: Pt normally lives in independent living at [facility] with his wife and walks with RW. During this tx session, pt was CGA for transfers and ambulated with RW 75' and 25 ft x 2, CGA  for balance, O2 at 2 LPM. Pt exhibits poor safety awareness with turning and urgency during toilet transfers. Frequent rest breaks provided throughout session. Pt currently not safe to return to prior living arrangements and needs 24hr assist and continued therapy. Pt would benefit from continued therapy during this hospital stay and at discharge in order to work on functional mobility, gait, balance, strengthening, and endurance.     Therapy Prognosis: Good  Decision Making: Medium Complexity  Requires PT Follow-Up: Yes  Activity Tolerance  Activity walking in hospital room?: A Little  How much help is needed climbing 3-5 steps with a railing?: Total  AM-PAC Inpatient Mobility Raw Score : 16  AM-PAC Inpatient T-Scale Score : 40.78  Mobility Inpatient CMS 0-100% Score: 54.16  Mobility Inpatient CMS G-Code Modifier : CK       Goals  Short Term Goals  Time Frame for Short Term Goals: 14  Short Term Goal 1: Pt will be independent bed mobility. Short Term Goal 2: Pt will be modified independent transfers with RW  Short Term Goal 3: Pt will be modified independent gait with RW 200ft with supplemental O2. Short Term Goal 4: Pt will be modified indep manage O2 line while walk in room with RW. Patient Goals   Patient Goals : return home       Education  Patient Education  Education Given To: Patient; Family  Education Provided: Plan of Care;Transfer Training; Fall Prevention Strategies; Energy Conservation  Education Provided Comments: pacing during activities, safe mobility techniques  Education Method: Demonstration;Verbal;Teach Back  Barriers to Learning: Hearing  Education Outcome: Verbalized understanding;Continued education needed;Demonstrated understanding      Therapy Time   Individual Concurrent Group Co-treatment   Time In 1359         Time Out 1440         Minutes 41         Timed Code Treatment Minutes: 85 Kehinde [place], PT

## 2023-12-15 NOTE — PLAN OF CARE
Problem: Discharge Planning  Goal: Discharge to home or other facility with appropriate resources  Outcome: Progressing   Patient actively participates in ADLs and decision making regarding plan of care    Problem: Safety - Adult  Goal: Free from fall injury  Outcome: Progressing   No falls/injuries this shift, bed in lowest position, breaks on, bed alarm on, call light within reach, side rails up X2    Problem: ABCDS Injury Assessment  Goal: Absence of physical injury  Outcome: Progressing   Fall assessment preformed. Bed in low locked position with call light and tray table within reach. Education given. Problem: Skin/Tissue Integrity  Goal: Absence of new skin breakdown  Description: 1. Monitor for areas of redness and/or skin breakdown  2. Assess vascular access sites hourly  3. Every 4-6 hours minimum:  Change oxygen saturation probe site  4. Every 4-6 hours:  If on nasal continuous positive airway pressure, respiratory therapy assess nares and determine need for appliance change or resting period.   Outcome: Progressing   No new skin breakdown noted, no new signs/symptoms of infection, continue to monitor lab work including WBC, and medications administered per physicians orders

## 2023-12-15 NOTE — CARE COORDINATION
Patient refusing need for any rehabilitation at a facility. He is open to [facility] coming in to assist at home with Nursing, PT and OT as needed.
Issues/Barriers to RETURNING to current housing: None  Potential Assistance needed at discharge: Home Care            Potential DME:    Patient expects to discharge to: [address] [facility] [place] for transportation at discharge: [facility] [place]: [facility] / Plan: [facility] / Product Type: *No Product type* /     Does insurance require precert for SNF: Yes    Potential assistance Purchasing Medications: No  Meds-to-Beds request:        [facility] [place] [phone] Christy Rockwell [address] [address]  Phone: [phone] Fax: [phone]      Notes:    Factors facilitating achievement of predicted outcomes: Family support, Cooperative, and Pleasant    Barriers to discharge: Lower extremity weakness    Additional Case Management Notes: Patient from [facility]. Patient lives with wife in facility. Plan return at discharge. Declines skilled nursing facility at discharge. Patient set up with [facility] from physicians office. Agreeable to resume at discharge. The Plan for Transition of Care is related to the following treatment goals of Hypoxia [B77.82]    IF APPLICABLE: The Patient and/or patient representative Essie Ross and his family were provided with a choice of provider and agrees with the discharge plan. Freedom of choice list with basic dialogue that supports the patient's individualized plan of care/goals and shares the quality data associated with the providers was provided to:     Patient Representative Name:       The Patient and/or Patient Representative Agree with the Discharge Plan?       [facility]  Case Management Department  Ph: [phone] Fax:

## 2023-12-15 NOTE — PROGRESS NOTES
Occupational Therapy  Facility/Department: [address]  Occupational Therapy Daily Treatment Note     Name: Santa Malone  : 1925  MRN: [id number]  Date of Service: 12/15/2023    Discharge Recommendations:  Patient would benefit from continued therapy after discharge          Patient Diagnosis(es): There were no encounter diagnoses. Past Medical History:  has a past medical history of BPH (benign prostatic hyperplasia), Cataracts, bilateral, COPD (chronic obstructive pulmonary disease) ([address]), Former smoker, Hearing loss, History of intermittent claudication, Hyperlipidemia, Intestinal infection due to Clostridium difficile, OA (osteoarthritis) of knee, Pneumonia, Prediabetes, and Spinal stenosis. Past Surgical History:  has a past surgical history that includes Coronary artery bypass graft (); Eye surgery (); knee surgery (Bilateral); and Tonsillectomy (). Assessment   Performance deficits / Impairments: Decreased functional mobility ; Decreased safe awareness;Decreased balance;Decreased ADL status; Decreased endurance;Decreased strength;Decreased coordination;Decreased posture  Assessment: Pt presents with decreased ADL status secondary to above noted deficits requiring assistance with functional tasks. Pt is most significantly limited by decreased balance and decreased safety awareness. Pt would benefit from continued skilled therapy while hospitalized to maximize pt's safety and independence with functional tasks. Pt would require 24hr assistance and continued skilled therapy after discharge. Prognosis: Good  REQUIRES OT FOLLOW-UP: Yes  Activity Tolerance  Activity Tolerance: Patient Tolerated treatment well        Plan   Occupational Therapy Plan  Times Per Week: 5-6x/wk  Times Per Day:  Once a day  Current Treatment Recommendations: Strengthening, Balance training, Functional mobility training, Endurance training, Safety education & training, Patient/Caregiver education & training, much help is needed for taking care of personal grooming?: A Little  How much help for eating meals?: A Little  AM-PAC Inpatient Daily Activity Raw Score: 16  AM-PAC Inpatient ADL T-Scale Score : 35.96  ADL Inpatient CMS 0-100% Score: 53.32  ADL Inpatient CMS G-Code Modifier : CK           Goals  Short Term Goals  Time Frame for Short Term Goals: 14 visits  Short Term Goal 1: Pt to complete all ADL transfers at MOD I with use of LRAD  Short Term Goal 2: Pt to complete toileting at MOD I  Short Term Goal 3: Pt to complete standing ADL for >10 mins with no LOB and no rest break  Short Term Goal 4: Pt to be IND with BUE HEP to support improved functional strength and endurance needed for ADLs and ADL transfers       Therapy Time   Individual Concurrent Group Co-treatment   Time In 1105         Time Out 1126         Minutes 21         Timed Code Treatment Minutes: [id number] [facility]

## 2023-12-16 VITALS
DIASTOLIC BLOOD PRESSURE: 72 MMHG | TEMPERATURE: 97.3 F | WEIGHT: 112.43 LBS | HEART RATE: 45 BPM | HEIGHT: 69 IN | OXYGEN SATURATION: 95 % | BODY MASS INDEX: 16.65 KG/M2 | SYSTOLIC BLOOD PRESSURE: 157 MMHG | RESPIRATION RATE: 16 BRPM

## 2023-12-16 LAB
ANION GAP SERPL CALCULATED.3IONS-SCNC: 9 MMOL/L (ref 9–17)
BASOPHILS # BLD: 0.1 K/UL (ref 0–0.2)
BASOPHILS NFR BLD: 2 % (ref 0–2)
BUN SERPL-MCNC: 11 MG/DL (ref 8–23)
CALCIUM SERPL-MCNC: 8.1 MG/DL (ref 8.6–10.4)
CHLORIDE SERPL-SCNC: 96 MMOL/L (ref 98–107)
CO2 SERPL-SCNC: 34 MMOL/L (ref 20–31)
CREAT SERPL-MCNC: 1 MG/DL (ref 0.7–1.2)
EOSINOPHIL # BLD: 0.2 K/UL (ref 0–0.4)
EOSINOPHILS RELATIVE PERCENT: 3 % (ref 1–4)
ERYTHROCYTE [DISTWIDTH] IN BLOOD BY AUTOMATED COUNT: 15.8 % (ref 12.5–15.4)
FOLATE SERPL-MCNC: 8 NG/ML
GFR SERPL CREATININE-BSD FRML MDRD: >60 ML/MIN/1.73M2
GLUCOSE SERPL-MCNC: 83 MG/DL (ref 70–99)
HCT VFR BLD AUTO: 35.9 % (ref 41–53)
HGB BLD-MCNC: 12.2 G/DL (ref 13.5–17.5)
IRON SATN MFR SERPL: 50 % (ref 20–55)
IRON SERPL-MCNC: 65 UG/DL (ref 59–158)
LYMPHOCYTES NFR BLD: 1.8 K/UL (ref 1–4.8)
LYMPHOCYTES RELATIVE PERCENT: 33 % (ref 24–44)
MAGNESIUM SERPL-MCNC: 1.7 MG/DL (ref 1.6–2.6)
MCH RBC QN AUTO: 30.9 PG (ref 26–34)
MCHC RBC AUTO-ENTMCNC: 33.9 G/DL (ref 31–37)
MCV RBC AUTO: 91.2 FL (ref 80–100)
MONOCYTES NFR BLD: 0.6 K/UL (ref 0.1–1.2)
MONOCYTES NFR BLD: 11 % (ref 2–11)
NEUTROPHILS NFR BLD: 51 % (ref 36–66)
NEUTS SEG NFR BLD: 2.7 K/UL (ref 1.8–7.7)
PLATELET # BLD AUTO: 183 K/UL (ref 140–450)
PMV BLD AUTO: 9.8 FL (ref 6–12)
POTASSIUM SERPL-SCNC: 3.5 MMOL/L (ref 3.7–5.3)
RBC # BLD AUTO: 3.94 M/UL (ref 4.5–5.9)
SODIUM SERPL-SCNC: 139 MMOL/L (ref 135–144)
TIBC SERPL-MCNC: 129 UG/DL (ref 250–450)
UNSATURATED IRON BINDING CAPACITY: 64 UG/DL (ref 112–347)
VIT B12 SERPL-MCNC: 558 PG/ML (ref 232–1245)
WBC OTHER # BLD: 5.3 K/UL (ref 3.5–11)

## 2023-12-16 PROCEDURE — 83550 IRON BINDING TEST: CPT

## 2023-12-16 PROCEDURE — 6370000000 HC RX 637 (ALT 250 FOR IP)

## 2023-12-16 PROCEDURE — 36415 COLL VENOUS BLD VENIPUNCTURE: CPT

## 2023-12-16 PROCEDURE — 82746 ASSAY OF FOLIC ACID SERUM: CPT

## 2023-12-16 PROCEDURE — 82607 VITAMIN B-12: CPT

## 2023-12-16 PROCEDURE — 80048 BASIC METABOLIC PNL TOTAL CA: CPT

## 2023-12-16 PROCEDURE — 97530 THERAPEUTIC ACTIVITIES: CPT

## 2023-12-16 PROCEDURE — 2580000003 HC RX 258: Performed by: NURSE PRACTITIONER

## 2023-12-16 PROCEDURE — 85025 COMPLETE CBC W/AUTO DIFF WBC: CPT

## 2023-12-16 PROCEDURE — 6360000002 HC RX W HCPCS: Performed by: NURSE PRACTITIONER

## 2023-12-16 PROCEDURE — 6360000002 HC RX W HCPCS: Performed by: STUDENT IN AN ORGANIZED HEALTH CARE EDUCATION/TRAINING PROGRAM

## 2023-12-16 PROCEDURE — 2580000003 HC RX 258

## 2023-12-16 PROCEDURE — 83540 ASSAY OF IRON: CPT

## 2023-12-16 PROCEDURE — 6370000000 HC RX 637 (ALT 250 FOR IP): Performed by: STUDENT IN AN ORGANIZED HEALTH CARE EDUCATION/TRAINING PROGRAM

## 2023-12-16 PROCEDURE — 97535 SELF CARE MNGMENT TRAINING: CPT

## 2023-12-16 PROCEDURE — 94669 MECHANICAL CHEST WALL OSCILL: CPT

## 2023-12-16 PROCEDURE — 99239 HOSP IP/OBS DSCHRG MGMT >30: CPT | Performed by: FAMILY MEDICINE

## 2023-12-16 PROCEDURE — 83735 ASSAY OF MAGNESIUM: CPT

## 2023-12-16 PROCEDURE — 6360000002 HC RX W HCPCS

## 2023-12-16 RX ORDER — FUROSEMIDE 20 MG/1
20 TABLET ORAL DAILY
Qty: 60 TABLET | Refills: 3 | Status: SHIPPED | OUTPATIENT
Start: 2023-12-16

## 2023-12-16 RX ORDER — MAGNESIUM SULFATE IN WATER 40 MG/ML
2000 INJECTION, SOLUTION INTRAVENOUS PRN
Status: DISCONTINUED | OUTPATIENT
Start: 2023-12-16 | End: 2023-12-16 | Stop reason: HOSPADM

## 2023-12-16 RX ORDER — AMOXICILLIN AND CLAVULANATE POTASSIUM 875; 125 MG/1; MG/1
1 TABLET, FILM COATED ORAL 2 TIMES DAILY
Qty: 10 TABLET | Refills: 0 | Status: SHIPPED | OUTPATIENT
Start: 2023-12-16 | End: 2023-12-21

## 2023-12-16 RX ADMIN — FINASTERIDE 5 MG: 5 TABLET, FILM COATED ORAL at 09:17

## 2023-12-16 RX ADMIN — HEPARIN SODIUM 5000 UNITS: 5000 INJECTION INTRAVENOUS; SUBCUTANEOUS at 09:25

## 2023-12-16 RX ADMIN — METOPROLOL TARTRATE 25 MG: 25 TABLET, FILM COATED ORAL at 09:17

## 2023-12-16 RX ADMIN — AMIODARONE HYDROCHLORIDE 200 MG: 200 TABLET ORAL at 09:16

## 2023-12-16 RX ADMIN — SODIUM CHLORIDE, PRESERVATIVE FREE 10 ML: 5 INJECTION INTRAVENOUS at 09:25

## 2023-12-16 RX ADMIN — MAGNESIUM SULFATE HEPTAHYDRATE 2000 MG: 40 INJECTION, SOLUTION INTRAVENOUS at 11:14

## 2023-12-16 RX ADMIN — ASPIRIN 81 MG CHEWABLE TABLET 81 MG: 81 TABLET CHEWABLE at 09:16

## 2023-12-16 RX ADMIN — PIPERACILLIN AND TAZOBACTAM 3375 MG: 3; .375 INJECTION, POWDER, LYOPHILIZED, FOR SOLUTION INTRAVENOUS at 11:17

## 2023-12-16 RX ADMIN — POTASSIUM CHLORIDE 40 MEQ: 1500 TABLET, EXTENDED RELEASE ORAL at 09:16

## 2023-12-16 RX ADMIN — PIPERACILLIN AND TAZOBACTAM 3375 MG: 3; .375 INJECTION, POWDER, LYOPHILIZED, FOR SOLUTION INTRAVENOUS at 02:30

## 2023-12-16 RX ADMIN — FUROSEMIDE 40 MG: 10 INJECTION, SOLUTION INTRAMUSCULAR; INTRAVENOUS at 09:17

## 2023-12-16 NOTE — PROGRESS NOTES
Oral, Daily, Jerica Adame MD, 81 mg at 12/16/23 0916    [Held by provider] furosemide (LASIX) tablet 20 mg, 20 mg, Oral, Daily, Jerica Adame MD    metoprolol tartrate (LOPRESSOR) tablet 25 mg, 25 mg, Oral, BID, Santana Hunter MD, 25 mg at 12/16/23 0917    finasteride (PROSCAR) tablet 5 mg, 5 mg, Oral, Daily, Jerica Adame MD, 5 mg at 12/16/23 0917    [COMPLETED] piperacillin-tazobactam (ZOSYN) 4,500 mg in sodium chloride 0.9 % 100 mL IVPB (mini-bag), 4,500 mg, IntraVENous, Once, Stopped at 12/11/23 1032 **FOLLOWED BY** piperacillin-tazobactam (ZOSYN) 3,375 mg in sodium chloride 0.9 % 50 mL IVPB (mini-bag), 3,375 mg, IntraVENous, Q8H, Jerica Adame MD, Last Rate: 12.5 mL/hr at 12/16/23 1135, Rate Verify at 12/16/23 1135    albuterol (PROVENTIL) (2.5 MG/3ML) 0.083% nebulizer solution 2.5 mg, 2.5 mg, Nebulization, Q4H PRN, Miranda Kang APRN - CNP, 2.5 mg at 12/11/23 1645    Lab Results:     [unfilled]  Lab Results   Component Value Date    WBC 5.3 12/16/2023    HGB 12.2 (L) 12/16/2023    HCT 35.9 (L) 12/16/2023    MCV 91.2 12/16/2023     12/16/2023     Lab Results   Component Value Date    CALCIUM 8.1 (L) 12/16/2023     12/16/2023    K 3.5 (L) 12/16/2023    CO2 34 (H) 12/16/2023    CL 96 (L) 12/16/2023    BUN 11 12/16/2023    CREATININE 1.0 12/16/2023     No components found for: \"ABGSAMPLETYP\", \"ABGBODYTEMP\", \"ABGPHCORRFOR\", \"FMEYXV7WEULVH\", \"ABGPHCORRFOOR\", \"ABGPH\", \"ABGPCO2\", \"ABGPO2\", \"ABGBASEEXCES\", \"ABGBASEDEFIC\", \"ABGHCO3\", \"RKFL0GHC\", \"ABGENDTIDALC\", \"ABGALLENSTES\", \"ABGSPO2\", \"ABGSAMEPLESIT\", \"[id number]\", \"ABGOXYGENSOU\"  Lab Results   Component Value Date    INR 1.1 12/12/2023    PROTIME 11.5 12/12/2023       Radiology:   XR CHEST PORTABLE    Result Date: 12/15/2023  EXAMINATION: ONE XRAY VIEW OF THE CHEST 12/15/2023 6:06 am COMPARISON: 12/12/2023. HISTORY: ORDERING SYSTEM PROVIDED HISTORY: FU CHF/atelectasis TECHNOLOGIST PROVIDED HISTORY: FU CHF/atelectasis Reason  failure with EF 20 to 25%, also with some diastolic dysfunction  Mild mitral regurgitation  Pulmonary hypertension with RVSP 33, suspect who group 2  Questionable aspiration pneumonia, now on dysphagia diet  No intrinsic lung disease  COVID-negative  Severe protein calorie malnutrition  Acute kidney injury upon chronic kidney disease, creatinine 1.4  DNR CC arrest    PLAN:   Continue diuresis  Encourage p.o. intake  Encourage up to chair  Encourage incentive spirometer  No plans for thoracentesis  Wean oxygen tolerated maintain saturation greater than    Critical Care Time: 0 minutes    If you have any questions, please feel free to contact me directly. Loretta Jaramillo MDSoutheastern [facility] Services  [phone] --(Cell)  [phone] (office/answering service)  [phone] (fax)      Electronically signed by Neno Enamorado MD on 12/16/23     This progress note was completed using a voice transcription system. Every effort was made to ensure accuracy. However, inadvertent computerized transcription errors may be present.     [phone] (office/answering service)

## 2023-12-16 NOTE — PROGRESS NOTES
Occupational Therapy  Facility/Department: [address]  Occupational Therapy Daily Treatment Note      Name: Suhas Leon  : 1925  MRN: [id number]  Date of Service: 2023    Discharge Recommendations:  Patient would benefit from continued therapy after discharge  OT Equipment Recommendations  Other: TBD     Patient Diagnosis(es): There were no encounter diagnoses.  Past Medical History:  has a past medical history of BPH (benign prostatic hyperplasia), Cataracts, bilateral, COPD (chronic obstructive pulmonary disease) (HCC), Former smoker, Hearing loss, History of intermittent claudication, Hyperlipidemia, Intestinal infection due to Clostridium difficile, OA (osteoarthritis) of knee, Pneumonia, Prediabetes, and Spinal stenosis.  Past Surgical History:  has a past surgical history that includes Coronary artery bypass graft (); Eye surgery (); knee surgery (Bilateral); and Tonsillectomy ().      Assessment   Performance deficits / Impairments: Decreased functional mobility ;Decreased safe awareness;Decreased balance;Decreased ADL status;Decreased endurance;Decreased strength;Decreased coordination;Decreased posture  Assessment: Pt is highly motivated to participate in OT POC and regain functional independence.  At this time, Pt has ongoing functional deficits and impairments (as listed above).  Pt will benefit from continued participation in Acute OT Services in order to continue to improve Pts participation in functional tasks, improve // regain independence with self-care and daily tasks, and return to prior living situation.  For safety at MO, Pt will benefit from 24 hour Supervision and Assist at Discharge.  Prognosis: Good  Decision Making: Medium Complexity             Plan   Occupational Therapy Plan  Times Per Week: 5-6x/wk  Times Per Day: Once a day  Current Treatment Recommendations: Strengthening, Balance training, Functional mobility training, Endurance training, Safety education &  training, Patient/Caregiver education & training, Equipment evaluation, education, & procurement, Self-Care / ADL, Modalities, Positioning       Restrictions  Restrictions/Precautions  Restrictions/Precautions: Up as Tolerated, Fall Risk  Required Braces or Orthoses?: No  Implants present? : Metal implants  Position Activity Restriction  Other position/activity restrictions: Up with assistance      Subjective   General  Patient assessed for rehabilitation services?: Yes  Family / Caregiver Present: Yes (wife)  Subjective  Subjective: RN ok'd pt for OT eval this AM. Pt reporting no pain at time of eval and agreeable/cooperative  General Comment  Comments: RN ok'd for therapy this PM. Pt agreeable to participate in session and was pleasant/cooperative throughout. Pt denied pain/numbness/tingling.        Social/Functional History  Social/Functional History  Lives With: Spouse (Reports spouse is able to assist as needed))  Type of Home: Facility (Independent living at [facility])  Home Layout: One level  Home Access: Elevator  Bathroom Shower/Tub: Walk-in shower  Bathroom Toilet: Standard  Bathroom Equipment: Grab bars in shower, Grab bars around toilet  Bathroom Accessibility: Walker accessible  Home Equipment: Carolynne Felty, Walker, rolling, Grab bars  Has the patient had two or more falls in the past year or any fall with injury in the past year?: No  Receives Help From: Family (Spouse or staff)  ADL Assistance: Independent  Homemaking Assistance: Needs assistance (Facility cooks and cleans, wife does laundry)  Homemaking Responsibilities: No  Ambulation Assistance: Independent (Reports since being discharged back home following last admission, pt is MOD I for mobility with RW)  Transfer Assistance: Independent  Active : Yes  Mode of Transportation: Car  Occupation: Retired  Type of Occupation: [employer] home [employer] [place]: Spend time with wife  IADL Comments: Receives assist for majority of IADLs however eating meals?: None  AM-PAC Inpatient Daily Activity Raw Score: 16  AM-PAC Inpatient ADL T-Scale Score : 35.96  ADL Inpatient CMS 0-100% Score: 53.32  ADL Inpatient CMS G-Code Modifier : CK      Goals  Short Term Goals  Time Frame for Short Term Goals: 14 visits  Short Term Goal 1: Pt to complete all ADL transfers at MOD I with use of LRAD  Short Term Goal 2: Pt to complete toileting at MOD I  Short Term Goal 3: Pt to complete standing ADL for >10 mins with no LOB and no rest break  Short Term Goal 4: Pt to be IND with BUE HEP to support improved functional strength and endurance needed for ADLs and ADL transfers       Therapy Time   Individual Concurrent Group Co-treatment   Time In 0920         Time Out 1019         Minutes 59         Timed Code Treatment Minutes: 59 Minutes (ADL + TherAct)       JUSTYNA De La Cruz, OTR/L

## 2023-12-16 NOTE — DISCHARGE INSTRUCTIONS
Date of admission: 12/10/2023  Date of discharge: 12/16/23    Your care was provided by the attending and resident physicians of the [address] [place] [address] Residency Program. There were no encounter diagnoses. FOLLOW-UP  - Follow up with your primary care physician Lynne Tinsley in 3 days. MEDICATIONS  -  your medication from the pharmacy and take as directed. - Continue taking your other home medications as directed. ADDITIONAL INSTRUCTIONS  - please continue taking your antibiotic for 5 days  - Please return immediately to the [facility] Emergency Department if worsening shortness of breath occurs, chest pain, fevers, chills, or other concerning symptoms.

## 2023-12-16 NOTE — PROGRESS NOTES
Home Oxygen Evaluation    Room air SpO2 at Rest =90                Room air with exercise/exertion =94

## 2023-12-16 NOTE — PLAN OF CARE
Problem: Respiratory - Adult  Goal: Achieves optimal ventilation and oxygenation  12/16/2023 1443 by Lisa Valderrama RN  Outcome: Progressing     Problem: Skin/Tissue Integrity  Goal: Absence of new skin breakdown  Description: 1. Monitor for areas of redness and/or skin breakdown  2. Assess vascular access sites hourly  3. Every 4-6 hours minimum:  Change oxygen saturation probe site  4. Every 4-6 hours:  If on nasal continuous positive airway pressure, respiratory therapy assess nares and determine need for appliance change or resting period.   12/16/2023 1737 by Lisa Valderrama RN  Outcome: Adequate for Discharge     Problem: ABCDS Injury Assessment  Goal: Absence of physical injury  12/16/2023 1737 by Lisa Valderrama RN  Outcome: Adequate for Discharge     Problem: Safety - Adult  Goal: Free from fall injury  12/16/2023 1737 by Lisa Valderrama RN  Outcome: Adequate for Discharge     Problem: Chronic Conditions and Co-morbidities  Goal: Patient's chronic conditions and co-morbidity symptoms are monitored and maintained or improved  12/16/2023 1443 by Lisa Valderrama RN  Outcome: Progressing     Problem: Discharge Planning  Goal: Discharge to home or other facility with appropriate resources  12/16/2023 1737 by Lisa Valderrama RN  Outcome: Adequate for Discharge     Problem: Discharge Planning  Goal: Discharge to home or other facility with appropriate resources  12/16/2023 1443 by Lisa Valderrama RN  Outcome: Progressing

## 2023-12-16 NOTE — PLAN OF CARE
Problem: Respiratory - Adult  Goal: Achieves optimal ventilation and oxygenation  12/16/2023 1756 by Yareli Wick RN  Outcome: Adequate for Discharge

## 2023-12-16 NOTE — PLAN OF CARE
Problem: Discharge Planning  Goal: Discharge to home or other facility with appropriate resources  12/15/2023 2301 by Starla Gonsalves RN  Outcome: Progressing  12/15/2023 1215 by Gordo Main RN  Outcome: Progressing     Problem: Chronic Conditions and Co-morbidities  Goal: Patient's chronic conditions and co-morbidity symptoms are monitored and maintained or improved  12/15/2023 2301 by Starla Gonsalves RN  Outcome: Progressing  12/15/2023 1215 by Gordo Main RN  Outcome: Progressing     Problem: Safety - Adult  Goal: Free from fall injury  12/15/2023 2301 by Starla Gonsalves RN  Outcome: Progressing  12/15/2023 1215 by Gordo Main RN  Outcome: Progressing     Problem: ABCDS Injury Assessment  Goal: Absence of physical injury  12/15/2023 2301 by Starla Gonsalves RN  Outcome: Progressing  12/15/2023 1215 by Gordo Main RN  Outcome: Progressing     Problem: Skin/Tissue Integrity  Goal: Absence of new skin breakdown  Description: 1. Monitor for areas of redness and/or skin breakdown  2. Assess vascular access sites hourly  3. Every 4-6 hours minimum:  Change oxygen saturation probe site  4. Every 4-6 hours:  If on nasal continuous positive airway pressure, respiratory therapy assess nares and determine need for appliance change or resting period.   12/15/2023 2301 by Starla Gonsalves RN  Outcome: Progressing  12/15/2023 1215 by Gordo Main RN  Outcome: Progressing     Problem: Respiratory - Adult  Goal: Achieves optimal ventilation and oxygenation  12/15/2023 2301 by Starla Gonsalves RN  Outcome: Progressing  12/15/2023 1215 by Gordo Main RN  Outcome: Progressing

## 2023-12-16 NOTE — PLAN OF CARE
Problem: Discharge Planning  Goal: Discharge to home or other facility with appropriate resources  Outcome: Progressing     Problem: Chronic Conditions and Co-morbidities  Goal: Patient's chronic conditions and co-morbidity symptoms are monitored and maintained or improved  Outcome: Progressing     Problem: Safety - Adult  Goal: Free from fall injury  Outcome: Progressing     Problem: ABCDS Injury Assessment  Goal: Absence of physical injury  Outcome: Progressing     Problem: Skin/Tissue Integrity  Goal: Absence of new skin breakdown  Description: 1. Monitor for areas of redness and/or skin breakdown  2. Assess vascular access sites hourly  3. Every 4-6 hours minimum:  Change oxygen saturation probe site  4. Every 4-6 hours:  If on nasal continuous positive airway pressure, respiratory therapy assess nares and determine need for appliance change or resting period.   Outcome: Progressing     Problem: Respiratory - Adult  Goal: Achieves optimal ventilation and oxygenation  Outcome: Progressing

## 2023-12-16 NOTE — PLAN OF CARE
Problem: Discharge Planning  Goal: Discharge to home or other facility with appropriate resources  12/16/2023 1737 by Patricia Godoy RN  Outcome: Adequate for Discharge     Problem: Chronic Conditions and Co-morbidities  Goal: Patient's chronic conditions and co-morbidity symptoms are monitored and maintained or improved  12/16/2023 1737 by Patricia Godoy RN  Outcome: Adequate for Discharge     Problem: Safety - Adult  Goal: Free from fall injury  12/16/2023 1737 by Patricia Godoy RN  Outcome: Adequate for Discharge     Problem: ABCDS Injury Assessment  Goal: Absence of physical injury  12/16/2023 1737 by Patricia Godoy RN  Outcome: Adequate for Discharge     Problem: Skin/Tissue Integrity  Goal: Absence of new skin breakdown  Description: 1. Monitor for areas of redness and/or skin breakdown  2. Assess vascular access sites hourly  3. Every 4-6 hours minimum:  Change oxygen saturation probe site  4. Every 4-6 hours:  If on nasal continuous positive airway pressure, respiratory therapy assess nares and determine need for appliance change or resting period.   12/16/2023 1737 by Patricia Godoy RN  Outcome: Adequate for Discharge     Problem: Respiratory - Adult  Goal: Achieves optimal ventilation and oxygenation  12/16/2023 1737 by Patricia Godoy RN  Outcome: Adequate for Discharge

## 2023-12-19 NOTE — PROGRESS NOTES
Neurological:      Mental Status: He is alert and oriented to person, place, and time. Initial post-discharge communication occurred between physician and patient on 12/18/2023- see documentation in chart: telephone encounter. Assessment/Plan:  Anahi Ahumada was seen today for follow-up from hospital for aspiration Pneumonia. Patient currently living at home with home health care and physical therapy. Hospital team recommended skilled nursing facility although patient refused. Currently on augmentin for the aspiration PNA. Confused on medications he should be taken. Diagnoses and all orders for this visit: Hospital discharge followup, Aspiration pneumonia, weakness, debilitation, decreased appetite. Continue augmentin  Continue with physical therapy   Living at Home with home health care through [address] of pureed food and thickened liquids to help prevent aspiration. Try to limit salt intake  Take atorvastatin, Finasteride, Amiodarone, metoprolol, augmentin, lasix, and aspirin are the current home medications he should be taking  Do not take flomax or simvastatin  Repeat chest xray in 6 weeks  Continue taking boost for protien supplementation  Diagnostic test results reviewed:inpatient labs, post-discharge labs, chest x-ray, and CT angiogram-pulmonary  Referrals: to [facility] for home health care needs. Follow up with Dr. Ewa Morales in four weeks for follow up. Patient risk of morbidity and mortality: moderate/high    Medical Decision Making: high complexity   Patient was seen and discussed with Dr. Ewa Morales at the time of the encounter.     Evert Melendez DO PGY-1

## 2023-12-20 ENCOUNTER — OFFICE VISIT (OUTPATIENT)
Age: 88
End: 2023-12-20

## 2023-12-20 VITALS
HEIGHT: 69 IN | WEIGHT: 111 LBS | OXYGEN SATURATION: 96 % | SYSTOLIC BLOOD PRESSURE: 131 MMHG | DIASTOLIC BLOOD PRESSURE: 58 MMHG | HEART RATE: 44 BPM | TEMPERATURE: 97.9 F | RESPIRATION RATE: 16 BRPM | BODY MASS INDEX: 16.44 KG/M2

## 2023-12-20 DIAGNOSIS — R53.81 DEBILITATED: ICD-10-CM

## 2023-12-20 DIAGNOSIS — J69.0 ASPIRATION PNEUMONIA OF RIGHT MIDDLE LOBE, UNSPECIFIED ASPIRATION PNEUMONIA TYPE (HCC): ICD-10-CM

## 2023-12-20 DIAGNOSIS — R53.1 WEAKNESS: ICD-10-CM

## 2023-12-20 DIAGNOSIS — R63.0 DECREASED APPETITE: ICD-10-CM

## 2023-12-20 DIAGNOSIS — Z09 HOSPITAL DISCHARGE FOLLOW-UP: Primary | ICD-10-CM

## 2023-12-20 RX ORDER — AMIODARONE HYDROCHLORIDE 200 MG/1
200 TABLET ORAL 2 TIMES DAILY
COMMUNITY

## 2023-12-20 RX ORDER — ASPIRIN 81 MG/1
81 TABLET, CHEWABLE ORAL DAILY
COMMUNITY

## 2023-12-20 NOTE — PATIENT INSTRUCTIONS
Thank you for following up with us at [facility] outpatient residency clinic! It was a pleasure to meet you today! Our plan is the following:  - For your medications continue taking Atorvastatin, Finasteride, Amiodarone, metoprolol, augmentin, lasix, and aspirin  - DO NOT TAKE flomax or simvastatin   - Follow up with Dr. Alisha Pastor in 4 weeks  - take all of your antibiotics  - continue using the breathing machine  - Continue diet of pureed food and thickened liquids to help prevent aspiration  - do your best to avoid salt  - continue working with Physical therapy. If you have any additional questions or concerns, please call the office ([phone]) and speak to one of the staff. They will triage and forward the message to the doctors! Have a great rest of your day!

## 2023-12-28 ENCOUNTER — HOSPITAL ENCOUNTER (EMERGENCY)
Age: 88
Discharge: HOME | End: 2023-12-29
Attending: EMERGENCY MEDICINE
Payer: COMMERCIAL

## 2023-12-28 ENCOUNTER — APPOINTMENT (OUTPATIENT)
Dept: CT IMAGING | Age: 88
End: 2023-12-28
Payer: COMMERCIAL

## 2023-12-28 ENCOUNTER — APPOINTMENT (OUTPATIENT)
Dept: GENERAL RADIOLOGY | Age: 88
End: 2023-12-28
Payer: COMMERCIAL

## 2023-12-28 VITALS
DIASTOLIC BLOOD PRESSURE: 67 MMHG | HEART RATE: 43 BPM | HEIGHT: 69 IN | BODY MASS INDEX: 15.55 KG/M2 | RESPIRATION RATE: 18 BRPM | OXYGEN SATURATION: 96 % | SYSTOLIC BLOOD PRESSURE: 150 MMHG | WEIGHT: 105 LBS | TEMPERATURE: 97.2 F

## 2023-12-28 DIAGNOSIS — S61.412A LACERATION OF LEFT HAND, FOREIGN BODY PRESENCE UNSPECIFIED, INITIAL ENCOUNTER: ICD-10-CM

## 2023-12-28 DIAGNOSIS — S00.81XA ABRASION OF FOREHEAD, INITIAL ENCOUNTER: ICD-10-CM

## 2023-12-28 DIAGNOSIS — R00.1 ASYMPTOMATIC BRADYCARDIA: ICD-10-CM

## 2023-12-28 DIAGNOSIS — S61.412A SKIN TEAR OF LEFT HAND WITHOUT COMPLICATION, INITIAL ENCOUNTER: ICD-10-CM

## 2023-12-28 DIAGNOSIS — S60.222A TRAUMATIC HEMATOMA OF LEFT HAND, INITIAL ENCOUNTER: ICD-10-CM

## 2023-12-28 DIAGNOSIS — S01.81XA LACERATION OF FOREHEAD, INITIAL ENCOUNTER: ICD-10-CM
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
## 2023-12-28 LAB
ANION GAP SERPL CALCULATED.3IONS-SCNC: 10 MMOL/L (ref 9–17)
BASOPHILS # BLD: 0.1 K/UL (ref 0–0.2)
BASOPHILS NFR BLD: 1 % (ref 0–2)
BNP SERPL-MCNC: 2860 PG/ML
BUN SERPL-MCNC: 32 MG/DL (ref 8–23)
CALCIUM SERPL-MCNC: 8.4 MG/DL (ref 8.6–10.4)
CHLORIDE SERPL-SCNC: 100 MMOL/L (ref 98–107)
CO2 SERPL-SCNC: 27 MMOL/L (ref 20–31)
CREAT SERPL-MCNC: 1.2 MG/DL (ref 0.7–1.2)
EOSINOPHIL # BLD: 0.1 K/UL (ref 0–0.4)
EOSINOPHILS RELATIVE PERCENT: 1 % (ref 1–4)
ERYTHROCYTE [DISTWIDTH] IN BLOOD BY AUTOMATED COUNT: 17.4 % (ref 12.5–15.4)
GFR SERPL CREATININE-BSD FRML MDRD: 55 ML/MIN/1.73M2
GLUCOSE SERPL-MCNC: 105 MG/DL (ref 70–99)
HCT VFR BLD AUTO: 33.7 % (ref 41–53)
HGB BLD-MCNC: 11.4 G/DL (ref 13.5–17.5)
LYMPHOCYTES NFR BLD: 2.1 K/UL (ref 1–4.8)
LYMPHOCYTES RELATIVE PERCENT: 31 % (ref 24–44)
MCH RBC QN AUTO: 31.2 PG (ref 26–34)
MCHC RBC AUTO-ENTMCNC: 33.8 G/DL (ref 31–37)
MCV RBC AUTO: 92.5 FL (ref 80–100)
MONOCYTES NFR BLD: 0.7 K/UL (ref 0.1–1.2)
MONOCYTES NFR BLD: 11 % (ref 2–11)
NEUTROPHILS NFR BLD: 56 % (ref 36–66)
NEUTS SEG NFR BLD: 3.7 K/UL (ref 1.8–7.7)
PLATELET # BLD AUTO: 183 K/UL (ref 140–450)
PMV BLD AUTO: 9.8 FL (ref 6–12)
POTASSIUM SERPL-SCNC: 4.1 MMOL/L (ref 3.7–5.3)
RBC # BLD AUTO: 3.65 M/UL (ref 4.5–5.9)
SODIUM SERPL-SCNC: 137 MMOL/L (ref 135–144)
TROPONIN I SERPL HS-MCNC: 25 NG/L (ref 0–22)
WBC OTHER # BLD: 6.7 K/UL (ref 3.5–11)

## 2023-12-28 PROCEDURE — 12004 RPR S/N/AX/GEN/TRK7.6-12.5CM: CPT

## 2023-12-28 PROCEDURE — 90715 TDAP VACCINE 7 YRS/> IM: CPT | Performed by: EMERGENCY MEDICINE

## 2023-12-28 PROCEDURE — 70450 CT HEAD/BRAIN W/O DYE: CPT

## 2023-12-28 PROCEDURE — 71045 X-RAY EXAM CHEST 1 VIEW: CPT

## 2023-12-28 PROCEDURE — 72170 X-RAY EXAM OF PELVIS: CPT

## 2023-12-28 PROCEDURE — 2580000003 HC RX 258: Performed by: EMERGENCY MEDICINE

## 2023-12-28 PROCEDURE — 90471 IMMUNIZATION ADMIN: CPT | Performed by: EMERGENCY MEDICINE

## 2023-12-28 PROCEDURE — 80048 BASIC METABOLIC PNL TOTAL CA: CPT

## 2023-12-28 PROCEDURE — 6360000002 HC RX W HCPCS: Performed by: EMERGENCY MEDICINE

## 2023-12-28 PROCEDURE — 70486 CT MAXILLOFACIAL W/O DYE: CPT

## 2023-12-28 PROCEDURE — 84484 ASSAY OF TROPONIN QUANT: CPT

## 2023-12-28 PROCEDURE — 73130 X-RAY EXAM OF HAND: CPT

## 2023-12-28 PROCEDURE — 72125 CT NECK SPINE W/O DYE: CPT

## 2023-12-28 PROCEDURE — 85025 COMPLETE CBC W/AUTO DIFF WBC: CPT

## 2023-12-28 PROCEDURE — 96365 THER/PROPH/DIAG IV INF INIT: CPT

## 2023-12-28 PROCEDURE — 36415 COLL VENOUS BLD VENIPUNCTURE: CPT

## 2023-12-28 PROCEDURE — 99285 EMERGENCY DEPT VISIT HI MDM: CPT

## 2023-12-28 PROCEDURE — 93005 ELECTROCARDIOGRAM TRACING: CPT | Performed by: EMERGENCY MEDICINE

## 2023-12-28 PROCEDURE — 83880 ASSAY OF NATRIURETIC PEPTIDE: CPT

## 2023-12-28 RX ADMIN — CEFAZOLIN 2000 MG: 1 INJECTION, POWDER, FOR SOLUTION INTRAMUSCULAR; INTRAVENOUS at 17:46

## 2023-12-28 RX ADMIN — TETANUS TOXOID, REDUCED DIPHTHERIA TOXOID AND ACELLULAR PERTUSSIS VACCINE, ADSORBED 0.5 ML: 5; 2.5; 8; 8; 2.5 SUSPENSION INTRAMUSCULAR at 17:44

## 2023-12-28 ASSESSMENT — PAIN - FUNCTIONAL ASSESSMENT: PAIN_FUNCTIONAL_ASSESSMENT: NONE - DENIES PAIN

## 2023-12-28 NOTE — ED PROVIDER NOTES
[address] [facility] Emergency Department  [id number] [facility] [address]  Phone: [phone]  Fax: [phone]       Pt Name: Ulises Dial  MRN: [id number]  [address] 1925  Date of evaluation: 23  PCP:  Jan Pacheco MD    CHIEF COMPLAINT       Chief Complaint   Patient presents with    Fall     Unwitnessed fall, hit shoe rack twice, large skin tear to left hand. No blood thinners. HISTORY OF PRESENT ILLNESS  (Location/Symptom, Timing/Onset, Context/Setting, Quality, Duration, Modifying Factors, Severity.)    Ulises Dial is a 80 y.o. male who presents with fall from standing height. Patient states that he was in the closet trying to hang something up and then had a mechanical fall resulting in him hitting the shoe rack in front of him. He states that he did hit his head, however did not lose consciousness. Understands need for back pain. Does not take any blood thinners. PAST MEDICAL / SURGICAL / SOCIAL / FAMILY HISTORY    has a past medical history of BPH (benign prostatic hyperplasia), Cataracts, bilateral, COPD (chronic obstructive pulmonary disease) ([address]), Former smoker, Hearing loss, History of intermittent claudication, Hyperlipidemia, Intestinal infection due to Clostridium difficile, OA (osteoarthritis) of knee, Pneumonia, Prediabetes, and Spinal stenosis. has a past surgical history that includes Coronary artery bypass graft (); Eye surgery (); knee surgery (Bilateral); and Tonsillectomy ().     Social History     Socioeconomic History    Marital status:      Spouse name: Not on file    Number of children: Not on file    Years of education: Not on file    Highest education level: Not on file   Occupational History    Not on file   Tobacco Use    Smoking status: Former     Current packs/day: 0.00     Types: Cigarettes     Start date: 1943     Quit date: 1950     Years since quittin.0    Smokeless tobacco: Never   Vaping daily 12/16/23   Jerica Adame MD   finasteride (PROSCAR) 5 MG tablet Take 1 tablet by mouth daily 11/20/23   Marcello Elise MD       REVIEW OF SYSTEMS     Review of Systems   HENT:  Negative for nosebleeds and tinnitus.    Eyes:  Negative for visual disturbance.   Respiratory:  Negative for shortness of breath.    Cardiovascular:  Negative for chest pain and palpitations.   Gastrointestinal:  Negative for nausea and vomiting.   Musculoskeletal:  Positive for myalgias. Negative for back pain and neck pain.   Skin:  Positive for wound.   Neurological:  Negative for dizziness, syncope, weakness, light-headedness and headaches.   Hematological:  Does not bruise/bleed easily.   Psychiatric/Behavioral:  Negative for confusion.        PHYSICAL EXAM    VITALS:   Vitals:    12/28/23 1652   BP: (!) 150/67   Pulse: (!) 43   Resp: 18   Temp: 97.2 °F (36.2 °C)   TempSrc: Axillary   SpO2: 96%   Weight: 47.6 kg (105 lb)   Height: 1.753 m (5' 9\")       Physical Exam  Vitals and nursing note reviewed.   Constitutional:       General: He is not in acute distress.     Appearance: He is well-developed and underweight. He is not diaphoretic.   HENT:      Head: Normocephalic. Abrasion, contusion and laceration present.      Jaw: There is normal jaw occlusion.        Comments: 3cm skin tear and laceration to the border of the forehead and hairline of the right parietal scalp/face     Right Ear: External ear normal.      Left Ear: External ear normal.      Mouth/Throat:      Mouth: Mucous membranes are moist.   Eyes:      Extraocular Movements: Extraocular movements intact.      Conjunctiva/sclera: Conjunctivae normal.      Pupils: Pupils are equal, round, and reactive to light.   Cardiovascular:      Rate and Rhythm: Regular rhythm. Bradycardia present.      Heart sounds: Normal heart sounds.   Pulmonary:      Effort: Pulmonary effort is normal. No respiratory distress.      Breath sounds: Normal breath sounds. No wheezing, rhonchi or

## 2023-12-29 ENCOUNTER — TELEPHONE (OUTPATIENT)
Age: 88
End: 2023-12-29

## 2023-12-29 LAB
EKG ATRIAL RATE: 43 BPM
EKG P AXIS: -15 DEGREES
EKG P-R INTERVAL: 196 MS
EKG Q-T INTERVAL: 454 MS
EKG QRS DURATION: 106 MS
EKG QTC CALCULATION (BAZETT): 383 MS
EKG R AXIS: 32 DEGREES
EKG T AXIS: 101 DEGREES
EKG VENTRICULAR RATE: 43 BPM
TROPONIN I SERPL HS-MCNC: 22 NG/L (ref 0–22)

## 2023-12-29 RX ORDER — DOXYCYCLINE HYCLATE 100 MG
100 TABLET ORAL 2 TIMES DAILY
Qty: 20 TABLET | Refills: 0 | Status: SHIPPED | OUTPATIENT
Start: 2023-12-29 | End: 2024-01-03

## 2023-12-29 NOTE — DISCHARGE INSTRUCTIONS
Please keep the area clean and dry for the next 24 hours. After this the area can be cleansed gently with plain soap and water and antibiotic ointment can be applied as desired 3 times a day. Your sutures will need to be reevaluated for removal in 7 days. This can be done at urgent care, the emergency department, or by your family doctor. Please watch the area for any increased swelling, redness, or drainage.

## 2023-12-29 NOTE — TELEPHONE ENCOUNTER
Spoke with patient home care nurse and she stated that she had a visit with the patient today and his BP was kind of heart rate was 44. She stated that the patient was told by PCP to continue taking the Amiodarone and his Metoprolol which she stated that can be a contradiction with each other. Patient has a wound on his bottom and she wanted to know if she can get an order to be able to put some Zinc oxide cream on it? Also he needs an order for PT/OT to continue? Patient lungs has been kind of dem an so she wanted to know if you wanted her to do a follow up xray?

## 2024-01-02 ENCOUNTER — HOSPITAL ENCOUNTER (EMERGENCY)
Age: 89
Discharge: HOME | End: 2024-01-02
Attending: EMERGENCY MEDICINE
Payer: COMMERCIAL

## 2024-01-02 VITALS
SYSTOLIC BLOOD PRESSURE: 185 MMHG | RESPIRATION RATE: 15 BRPM | TEMPERATURE: 97.5 F | WEIGHT: 105 LBS | BODY MASS INDEX: 15.55 KG/M2 | OXYGEN SATURATION: 95 % | HEART RATE: 51 BPM | HEIGHT: 69 IN | DIASTOLIC BLOOD PRESSURE: 62 MMHG

## 2024-01-02 DIAGNOSIS — Z87.828 HISTORY OF OPEN HAND WOUND: Primary | ICD-10-CM

## 2024-01-02 PROCEDURE — 96372 THER/PROPH/DIAG INJ SC/IM: CPT

## 2024-01-02 PROCEDURE — 6360000002 HC RX W HCPCS: Performed by: NURSE PRACTITIONER

## 2024-01-02 PROCEDURE — 99284 EMERGENCY DEPT VISIT MOD MDM: CPT

## 2024-01-02 RX ORDER — CEFTRIAXONE 1 G/1
1000 INJECTION, POWDER, FOR SOLUTION INTRAMUSCULAR; INTRAVENOUS ONCE
Status: COMPLETED | OUTPATIENT
Start: 2024-01-02 | End: 2024-01-02

## 2024-01-02 RX ADMIN — CEFTRIAXONE SODIUM 1000 MG: 1 INJECTION, POWDER, FOR SOLUTION INTRAMUSCULAR; INTRAVENOUS at 14:01

## 2024-01-02 ASSESSMENT — PAIN - FUNCTIONAL ASSESSMENT: PAIN_FUNCTIONAL_ASSESSMENT: 0-10

## 2024-01-02 ASSESSMENT — PAIN SCALES - GENERAL: PAINLEVEL_OUTOF10: 8

## 2024-01-02 ASSESSMENT — PAIN DESCRIPTION - ORIENTATION: ORIENTATION: LEFT

## 2024-01-02 ASSESSMENT — PAIN DESCRIPTION - LOCATION: LOCATION: HAND

## 2024-01-02 NOTE — ED PROVIDER NOTES
1.753 m (5' 9\") 47.6 kg (105 lb)       Constitutional: Alert, oriented x3, nontoxic, answering questions appropriately, acting properly for age, in no acute distress   HEENT: Extraocular muscles intact  Neck: Trachea midline,  Musculoskeletal: Left upper extremity no pain at the shoulder elbow or wrist.  Radial and ulnar arteries Necker capillary for less than 2 seconds.  There is marked swelling with ecchymosis to the left dorsum of the hand and fingers consistent with bruising not so much infection.  There is a laceration to the dorsum of the hand which is repaired and has some active minimal bleeding.  No lymphangitis.  Neurologic: Left upper extremity motor and sensory intact.  Skin: Warm and dry       DIFFERENTIAL DIAGNOSIS/ MDM:     Dressing change and fill the patient's prescription for doxycycline.  Follow-up with [facility] physician for reevaluation and return if worsening symptoms or other concerns.    DIAGNOSTIC RESULTS     EKG: All EKG's are interpreted by the Emergency Department Physician who either signs or Co-signs this chart in the absence of a cardiologist.        Not indicated unless otherwise documented above    LABS:  No results found for this visit on 01/02/24.    Not indicated unless otherwise documented above    RADIOLOGY:   I reviewed the radiologist interpretations:    No orders to display       Not indicated unless otherwise documented above    EMERGENCY DEPARTMENT COURSE:     The patient was given the following medications:  Orders Placed This Encounter   Medications    cefTRIAXone (ROCEPHIN) injection 1,000 mg     Order Specific Question:   Antimicrobial Indications     Answer:   Skin and Soft Tissue Infection        Vitals:   -------------------------  BP (!) 185/62   Pulse 51   Temp 97.5 °F (36.4 °C) (Axillary)   Resp 15   Ht 1.753 m (5' 9\")   Wt 47.6 kg (105 lb)   SpO2 95%   BMI 15.51 kg/m²         CRITICAL CARE:    None    PROCEDURES:    See midlevel documentation      OARRS Report 
none    LABS:  Labs Reviewed - No data to display    All other labs were within normal range or not returned as of this dictation.    EMERGENCY DEPARTMENT COURSE and DIFFERENTIAL DIAGNOSIS/MDM:   Vitals:    Vitals:    01/02/24 1314   BP: (!) 185/62   Pulse: 51   Resp: 15   Temp: 97.5 °F (36.4 °C)   TempSrc: Axillary   SpO2: 95%   Weight: 47.6 kg (105 lb)   Height: 1.753 m (5' 9\")               REASSESSMENT          PROCEDURES:  Unless otherwise noted below, none     Procedures      FINAL IMPRESSION      1. History of open hand wound          DISPOSITION/PLAN   DISPOSITION Decision To Discharge 01/02/2024 01:20:38 PM      PATIENT REFERRED TO:  Marcello Elise MD  [address] [id number] [phone]    Schedule an appointment as soon as possible for a visit in 2 days  For wound re-check      DISCHARGE MEDICATIONS:  New Prescriptions    No medications on file     Controlled Substances Monitoring:          No data to display                (Please note that portions of this note were completed with a voice recognition program.  Efforts were made to edit the dictations but occasionally words are mis-transcribed.)    JOSHUA Truong CNP (electronically signed)            Pao Doshi APRN - CNP  01/02/24 2479

## 2024-01-02 NOTE — TELEPHONE ENCOUNTER
Spoke with [facility] nurse and she had informed us pt was back in the ER today...She will call him later and plan to go out in am...  I called and spoke with wife and today was about his hand that wouldn't stop bleeding so they went back for that, they bandaged it up and gave him a shot of antibiotic..We told them we would come out for a home visit and the wife said that was ok...  Told her would coordinate with our  here and let her know when...Dr Elise saw message form [facility] with lots of questions..the patient needs to be seen...  Please advise-

## 2024-01-02 NOTE — DISCHARGE INSTRUCTIONS
Please follow-up with Dr. Elise on Friday for reevaluation and removal of sutures--  Please call Dr. Elise's office upon leaving the emergency department or first thing in the morning to schedule an appointment by Friday for reevaluation.    Change dressing on left hand 1-2 times per day    Keep left hand wound clean and dry    Monitor wounds for signs of infection including redness increased swelling, purulent drainage, fevers.  If any of these symptoms are noted please contact your family doctor or return to the emergency department immediately    Please  antibiotic from [facility] pharmacy and begin taking today--take full course of antibiotic    If any of your symptoms worsen or any new or concerning symptoms arise return to the emergency department immediately

## 2024-01-03 ENCOUNTER — TELEPHONE (OUTPATIENT)
Age: 89
End: 2024-01-03

## 2024-01-03 ENCOUNTER — OUTSIDE SERVICES (OUTPATIENT)
Age: 89
End: 2024-01-03

## 2024-01-03 VITALS
OXYGEN SATURATION: 100 % | DIASTOLIC BLOOD PRESSURE: 52 MMHG | SYSTOLIC BLOOD PRESSURE: 108 MMHG | RESPIRATION RATE: 17 BRPM | HEART RATE: 62 BPM | TEMPERATURE: 98.5 F

## 2024-01-03 DIAGNOSIS — R00.1 BRADYCARDIA: ICD-10-CM

## 2024-01-03 DIAGNOSIS — S01.01XA LACERATION OF SKIN OF SCALP, INITIAL ENCOUNTER: ICD-10-CM

## 2024-01-03 DIAGNOSIS — S51.812A LACERATION OF LEFT FOREARM, INITIAL ENCOUNTER: Primary | ICD-10-CM

## 2024-01-03 RX ORDER — DOXYCYCLINE HYCLATE 100 MG
100 TABLET ORAL 2 TIMES DAILY
Qty: 20 TABLET | Refills: 0 | Status: SHIPPED | OUTPATIENT
Start: 2024-01-03 | End: 2024-01-13

## 2024-01-03 ASSESSMENT — ENCOUNTER SYMPTOMS
SHORTNESS OF BREATH: 0
GASTROINTESTINAL NEGATIVE: 1
RESPIRATORY NEGATIVE: 1

## 2024-01-03 ASSESSMENT — VISUAL ACUITY: OU: 1

## 2024-01-03 NOTE — PROGRESS NOTES
Food in the Last Year: Never true   Transportation Needs: No Transportation Needs (12/11/2023)    PRAPARE - Transportation     Lack of Transportation (Medical): No     Lack of Transportation (Non-Medical): No   Housing Stability: Low Risk  (12/11/2023)    Housing Stability Vital Sign     Unable to Pay for Housing in the Last Year: No     Number of Places Lived in the Last Year: 1     Unstable Housing in the Last Year: No        PHYSICAL EXAM     BP (!) 108/52   Pulse 62   Temp 98.5 °F (36.9 °C)   Resp 17   SpO2 100%     Physical Exam  Vitals and nursing note reviewed.   Constitutional:       General: He is not in acute distress.     Appearance: Normal appearance. He is normal weight. He is not ill-appearing or toxic-appearing.   HENT:      Head: Normocephalic.        Comments: Two areas on left and right scalp with recent scarring and dried blood     Nose: Nose normal.      Mouth/Throat:      Mouth: Mucous membranes are moist.   Eyes:      General: Vision grossly intact.   Cardiovascular:      Rate and Rhythm: Normal rate and regular rhythm.      Pulses: Normal pulses.      Heart sounds: No murmur heard.  Pulmonary:      Effort: Pulmonary effort is normal. No respiratory distress.      Breath sounds: No wheezing.   Musculoskeletal:         General: Normal range of motion.      Right lower leg: No edema.      Left lower leg: No edema.   Skin:     General: Skin is warm and dry.      Capillary Refill: Capillary refill takes less than 2 seconds.      Coloration: Skin is not jaundiced.      Findings: Laceration present. No rash.      Comments: Dressing on left forearm clean/dry/intact; sutures intact upon closer inspection   Neurological:      General: No focal deficit present.      Mental Status: He is alert.      Cranial Nerves: No facial asymmetry.      Gait: Gait is intact.   Psychiatric:         Mood and Affect: Mood normal.         Behavior: Behavior normal.         ASSESSMENT/PLAN     1. Laceration of left

## 2024-01-03 NOTE — ASSESSMENT & PLAN NOTE
Will reduce metoprolol tartrate to 12.5 mg BID for now. Home health nurse to help with cutting pills. Will clarify with help of Dr. Elise the status of patient's amiodarone. Will also provide referral again to Cardiology, if needed.

## 2024-01-03 NOTE — ASSESSMENT & PLAN NOTE
Doxycycline reordered to local pharmacy. Home health nurse to remove sutures this Friday/next Monday. No other concerns about appearance of wound at this time. Patient and spouse counseled on appropriate Tylenol dosing in conjunction with pharmacy resident.

## 2024-01-03 NOTE — TELEPHONE ENCOUNTER
Spoke with Ms. Carolyn, patient's spouse, who is agreeable to having a group from [facility] come for a home visit today at 11:30 AM.

## 2024-01-04 PROBLEM — E46 PROTEIN CALORIE MALNUTRITION (HCC): Status: ACTIVE | Noted: 2024-01-04

## 2024-01-07 ASSESSMENT — ENCOUNTER SYMPTOMS
RESPIRATORY NEGATIVE: 1
SHORTNESS OF BREATH: 0
GASTROINTESTINAL NEGATIVE: 1

## 2024-01-07 NOTE — PROGRESS NOTES
[facility]  [address] [place] [address]  Phone: [phone]  Fax: [phone]      Date of Visit:  2024  Patient Name: Suhas Leon   Patient :  1925     Assessment:     1. PVC's (premature ventricular contractions)    2. Pressure injury of contiguous region involving back, left buttock, and left hip, stage 1    3. Laceration of left hand without foreign body, subsequent encounter    4. Dysphagia, unspecified type    5. Unspecified severe protein-calorie malnutrition (HCC)    6. Acute on chronic systolic (congestive) heart failure (HCC)    7. Chronic combined systolic (congestive) and diastolic (congestive) heart failure (HCC)    8. Other ventricular tachycardia (HCC)    9. Severe protein-calorie malnutrition (HCC)    10. Chronic renal disease, stage II    11. Benign prostatic hyperplasia without lower urinary tract symptoms    12. Mixed hyperlipidemia    13. Community acquired pneumonia of right lower lobe of lung    14. Hypertensive heart and chronic kidney disease with heart failure (HCC)    15. Former smoker    16. Chronic combined systolic and diastolic congestive heart failure (HCC)        Plan:      1 ventricular dysrhythmia/congestive heart failure.  Patient still has heart rate in the high 40s.  Since decreasing the metoprolol to 12.5, he feels less lightheaded but still feels tired and has no \"zip\".  At this point we have decreased his amiodarone from 100 mg twice daily to 100 mg once a day.  This had been incorrectly noted in the chart that he was completely off the amiodarone after the last hospitalization.  Dorothea, visiting nurse will continue to keep an eye on his blood pressure and if he has more ectopy.  At this point we will continue to manage    2 continue with current management for patient's skin care and laceration.    3 dysphagia-patient and wife say that he is swallowing better with no episodes of choking.

## 2024-01-08 ENCOUNTER — TELEPHONE (OUTPATIENT)
Age: 89
End: 2024-01-08

## 2024-01-08 NOTE — TELEPHONE ENCOUNTER
Attempted to call patient to inquire about status of suture removal. No answer - VM left with callback number and reminder about appointment tomorrow.

## 2024-01-08 NOTE — TELEPHONE ENCOUNTER
Spoke with patient wife and she stated that the patient did have his sutures removed by his home care nurse.

## 2024-01-09 ENCOUNTER — FOLLOWUP TELEPHONE ENCOUNTER (OUTPATIENT)
Age: 89
End: 2024-01-09

## 2024-01-09 ENCOUNTER — TELEPHONE (OUTPATIENT)
Age: 89
End: 2024-01-09

## 2024-01-09 ENCOUNTER — SCHEDULED TELEPHONE ENCOUNTER (OUTPATIENT)
Age: 89
End: 2024-01-09
Payer: COMMERCIAL

## 2024-01-09 DIAGNOSIS — I47.29 OTHER VENTRICULAR TACHYCARDIA (HCC): ICD-10-CM

## 2024-01-09 DIAGNOSIS — I50.42 CHRONIC COMBINED SYSTOLIC AND DIASTOLIC CONGESTIVE HEART FAILURE (HCC): ICD-10-CM

## 2024-01-09 DIAGNOSIS — N40.0 BENIGN PROSTATIC HYPERPLASIA WITHOUT LOWER URINARY TRACT SYMPTOMS: ICD-10-CM

## 2024-01-09 DIAGNOSIS — L89.41: ICD-10-CM

## 2024-01-09 DIAGNOSIS — J18.9 COMMUNITY ACQUIRED PNEUMONIA OF RIGHT LOWER LOBE OF LUNG: ICD-10-CM

## 2024-01-09 DIAGNOSIS — E43 SEVERE PROTEIN-CALORIE MALNUTRITION (HCC): ICD-10-CM

## 2024-01-09 DIAGNOSIS — I50.23 ACUTE ON CHRONIC SYSTOLIC (CONGESTIVE) HEART FAILURE (HCC): ICD-10-CM

## 2024-01-09 DIAGNOSIS — R13.10 DYSPHAGIA, UNSPECIFIED TYPE: ICD-10-CM

## 2024-01-09 DIAGNOSIS — I50.42 CHRONIC COMBINED SYSTOLIC (CONGESTIVE) AND DIASTOLIC (CONGESTIVE) HEART FAILURE (HCC): ICD-10-CM

## 2024-01-09 DIAGNOSIS — E43 UNSPECIFIED SEVERE PROTEIN-CALORIE MALNUTRITION (HCC): ICD-10-CM

## 2024-01-09 DIAGNOSIS — E78.2 MIXED HYPERLIPIDEMIA: ICD-10-CM

## 2024-01-09 DIAGNOSIS — S61.412D LACERATION OF LEFT HAND WITHOUT FOREIGN BODY, SUBSEQUENT ENCOUNTER: ICD-10-CM

## 2024-01-09 DIAGNOSIS — Z87.891 FORMER SMOKER: ICD-10-CM

## 2024-01-09 DIAGNOSIS — I13.0 HYPERTENSIVE HEART AND CHRONIC KIDNEY DISEASE WITH HEART FAILURE (HCC): ICD-10-CM

## 2024-01-09 DIAGNOSIS — N18.2 CHRONIC RENAL DISEASE, STAGE II: ICD-10-CM

## 2024-01-09 DIAGNOSIS — I49.3 PVC'S (PREMATURE VENTRICULAR CONTRACTIONS): Primary | ICD-10-CM

## 2024-01-09 PROCEDURE — 99443 PR PHYS/QHP TELEPHONE EVALUATION 21-30 MIN: CPT | Performed by: FAMILY MEDICINE

## 2024-01-09 RX ORDER — AMIODARONE HYDROCHLORIDE 200 MG/1
200 TABLET ORAL DAILY
Qty: 30 TABLET | Refills: 1
Start: 2024-01-09 | End: 2024-01-12 | Stop reason: SDUPTHER

## 2024-01-09 RX ORDER — AMIODARONE HYDROCHLORIDE 100 MG/1
TABLET ORAL
Qty: 30 TABLET | Refills: 5
Start: 2024-01-09 | End: 2024-01-09

## 2024-01-09 RX ORDER — FINASTERIDE 5 MG/1
5 TABLET, FILM COATED ORAL DAILY
Qty: 30 TABLET | Refills: 3 | Status: SHIPPED | OUTPATIENT
Start: 2024-01-09

## 2024-01-09 RX ORDER — AMIODARONE HYDROCHLORIDE 100 MG/1
100 TABLET ORAL DAILY
Qty: 1 TABLET | Refills: 1
Start: 2024-01-09 | End: 2024-01-09

## 2024-01-09 NOTE — TELEPHONE ENCOUNTER
Dorothea nurse with [facility] called to give a couple updates:  Keyshawn resting HR is 46  When are office visited there house last week his Metoprolol was decreased to 12.5 to see if it would help his HR  Right now they call is his HR is less than 55, do you want the parameters changed?  His forehead wounds are pretty much healed. His left hand they are still working on. She states they his hand looks ok but she suggest to keep it wrapped with Gauze and an Ace bandage  He started doxycycline 100mg 2x day for 10 days on Friday

## 2024-01-12 DIAGNOSIS — I47.29 OTHER VENTRICULAR TACHYCARDIA (HCC): ICD-10-CM

## 2024-01-12 DIAGNOSIS — I49.3 PVC'S (PREMATURE VENTRICULAR CONTRACTIONS): ICD-10-CM

## 2024-01-12 DIAGNOSIS — I50.42 CHRONIC COMBINED SYSTOLIC AND DIASTOLIC CONGESTIVE HEART FAILURE (HCC): ICD-10-CM

## 2024-01-12 RX ORDER — AMIODARONE HYDROCHLORIDE 200 MG/1
200 TABLET ORAL DAILY
Qty: 30 TABLET | Refills: 2 | Status: SHIPPED | OUTPATIENT
Start: 2024-01-12

## 2024-01-26 ENCOUNTER — TELEPHONE (OUTPATIENT)
Age: 89
End: 2024-01-26

## 2024-01-26 NOTE — TELEPHONE ENCOUNTER
Pt sent to ER per conversation with Gilma and Dr Hunter...[facility] nurse had called to say pt was  SOB, c/o no energy, had choking spell earlier today, crackly lungs but would clear with a cough, HR was 48..We advised calling squad for pt to get to the ER...[facility] nurse agreed-

## 2024-01-26 NOTE — TELEPHONE ENCOUNTER
Nurse Dorothea was calling to give you a few updates:    Stated that the home was very tense. Stated that the wife was in corner up against a wall and stated to her that she doesn't know how much more she can take. She stated that he had asked her for a glass of water and she told him to get it hisself and they started to argue. Dorothea stated that she visited for 2 hours to help calm down the situation and when she left him and his wife was at ease.      Also stated that therapy was not coming, but they are coming this weekend.    Patient is also refusing to go to the ER for his SOB

## 2024-01-30 ENCOUNTER — TELEPHONE (OUTPATIENT)
Age: 89
End: 2024-01-30

## 2024-01-30 DIAGNOSIS — H91.13 PRESBYCUSIS OF BOTH EARS: Primary | ICD-10-CM

## 2024-01-30 NOTE — TELEPHONE ENCOUNTER
[facility] asking for an order for complete audiological testing to assess for new hearing aids.    Fax ordet to [phone]

## 2024-02-01 ENCOUNTER — TELEPHONE (OUTPATIENT)
Age: 89
End: 2024-02-01

## 2024-02-01 NOTE — TELEPHONE ENCOUNTER
Danielle Michael PTA from [employer] wanted to report that pt fell yesterday 1/31/24 was getting gas in car had a dizzy spell or possibly blacked out and slid down side of car and landed on his bottom. No injury and did not hit head.     Dfmariella Michael -KEKE  [phone]

## 2024-02-02 NOTE — TELEPHONE ENCOUNTER
I touched base with the patient as well as his wifeToday.  Both are doing well, he states that he went around the side of the car and felt lightheaded and then just slid down he states that he did not pass out.  People at the gas station came out and helped him back to his seat.  His wife was going to pump gas but they had her sit in the car and they took care of the gas.  In talking with his wife, Ashley, she states that she has no trouble with his driving around town.  They do not drive very fast and he is very careful only during daytime hours.  Again I did let them know that when getting for gas, they probably need to have the gas station attendants pumped the gas.  She voiced good understanding.

## 2024-02-11 DIAGNOSIS — I47.29 OTHER VENTRICULAR TACHYCARDIA (HCC): ICD-10-CM

## 2024-02-11 DIAGNOSIS — I50.42 CHRONIC COMBINED SYSTOLIC AND DIASTOLIC CONGESTIVE HEART FAILURE (HCC): ICD-10-CM

## 2024-02-11 DIAGNOSIS — I49.3 PVC'S (PREMATURE VENTRICULAR CONTRACTIONS): ICD-10-CM

## 2024-02-12 RX ORDER — AMIODARONE HYDROCHLORIDE 200 MG/1
200 TABLET ORAL DAILY
Qty: 60 TABLET | Refills: 1 | Status: SHIPPED | OUTPATIENT
Start: 2024-02-12

## 2024-02-16 NOTE — PROGRESS NOTES
[facility]  [address] [place] [address]  Phone: [phone]  Fax: [phone]      Date of Visit:  2024  Patient Name: Suhas Leon   Patient :  1925     Assessment:     1. PVC's (premature ventricular contractions)    2. Unspecified severe protein-calorie malnutrition (HCC)    3. Acute on chronic systolic (congestive) heart failure (HCC)    4. Chronic combined systolic (congestive) and diastolic (congestive) heart failure (HCC)    5. Other ventricular tachycardia (HCC)    6. Need for pneumococcal 20-valent conjugate vaccination        Plan:      Cardiology visit to adjust medications and perhaps add digoxin because of orthostatic hypotension.    Information given on nutrition and eating in the elderly.    Fall precautions with continued physical therapy and use of walker.    I have encouraged him to use the  whenever necessary to go out and to decrease use of driving himself.         Patient Instructions   I think that the problem is that your blood pressure drops when you change position which causes lightheadedness.  I am going to have the cardiologist decide whether or not you need to add a medication called digoxin and remove the beta-blocker (metoprolol) in order to elevate your blood pressure.    Concerning food tasting better: As we discussed be sure that food is warm but do not burn your mouth.  Also please get things that can be microwavable such as pot pies and frozen orders so that you can eat a small amount at a time when you are hungry.  You can add these things to your normal boost.    I will see you back in 3 months.  We will continue with visiting nurse.         Requested Prescriptions      No prescriptions requested or ordered in this encounter       There are no discontinued medications.    Return in about 3 months (around 2024) for Followup.    Suhas was given educational materials: See patient

## 2024-02-19 ENCOUNTER — OFFICE VISIT (OUTPATIENT)
Age: 89
End: 2024-02-19
Payer: COMMERCIAL

## 2024-02-19 VITALS
HEART RATE: 44 BPM | DIASTOLIC BLOOD PRESSURE: 43 MMHG | BODY MASS INDEX: 15.8 KG/M2 | WEIGHT: 107 LBS | SYSTOLIC BLOOD PRESSURE: 78 MMHG | TEMPERATURE: 98 F | RESPIRATION RATE: 16 BRPM

## 2024-02-19 DIAGNOSIS — Z23 NEED FOR PNEUMOCOCCAL 20-VALENT CONJUGATE VACCINATION: ICD-10-CM

## 2024-02-19 DIAGNOSIS — I47.29 OTHER VENTRICULAR TACHYCARDIA (HCC): ICD-10-CM

## 2024-02-19 DIAGNOSIS — I50.23 ACUTE ON CHRONIC SYSTOLIC (CONGESTIVE) HEART FAILURE (HCC): ICD-10-CM

## 2024-02-19 DIAGNOSIS — I50.42 CHRONIC COMBINED SYSTOLIC (CONGESTIVE) AND DIASTOLIC (CONGESTIVE) HEART FAILURE (HCC): ICD-10-CM

## 2024-02-19 DIAGNOSIS — E43 UNSPECIFIED SEVERE PROTEIN-CALORIE MALNUTRITION (HCC): ICD-10-CM

## 2024-02-19 DIAGNOSIS — I49.3 PVC'S (PREMATURE VENTRICULAR CONTRACTIONS): Primary | ICD-10-CM

## 2024-02-19 PROCEDURE — 90677 PCV20 VACCINE IM: CPT | Performed by: FAMILY MEDICINE

## 2024-02-19 PROCEDURE — 99212 OFFICE O/P EST SF 10 MIN: CPT | Performed by: FAMILY MEDICINE

## 2024-02-19 RX ORDER — ACETAMINOPHEN 325 MG/1
650 TABLET ORAL EVERY 6 HOURS PRN
COMMUNITY

## 2024-02-19 NOTE — PATIENT INSTRUCTIONS
I think that the problem is that your blood pressure drops when you change position which causes lightheadedness.  I am going to have the cardiologist decide whether or not you need to add a medication called digoxin and remove the beta-blocker (metoprolol) in order to elevate your blood pressure.    Concerning food tasting better: As we discussed be sure that food is warm but do not burn your mouth.  Also please get things that can be microwavable such as pot pies and frozen orders so that you can eat a small amount at a time when you are hungry.  You can add these things to your normal boost.    I will see you back in 3 months.  We will continue with visiting nurse.

## 2024-02-25 DIAGNOSIS — S01.01XA LACERATION OF SKIN OF SCALP, INITIAL ENCOUNTER: ICD-10-CM

## 2024-02-25 DIAGNOSIS — S51.812A LACERATION OF LEFT FOREARM, INITIAL ENCOUNTER: ICD-10-CM

## 2024-02-26 RX ORDER — DOXYCYCLINE HYCLATE 100 MG
100 TABLET ORAL 2 TIMES DAILY
Qty: 20 TABLET | Refills: 0 | OUTPATIENT
Start: 2024-02-26 | End: 2024-03-07

## 2024-02-28 ENCOUNTER — TELEPHONE (OUTPATIENT)
Age: 89
End: 2024-02-28

## 2024-02-28 NOTE — TELEPHONE ENCOUNTER
Nurse  practitioner from his insurance plan did a telephone visit with patient. Suhas complains of low heart rate (in the 40's) and fatigue.  Wife told her Suhas's cardiologist wants his heart rate low.  She advised patient and wife to contact cardiologist but also wanted to make you aware.

## 2024-03-01 ENCOUNTER — TELEPHONE (OUTPATIENT)
Age: 89
End: 2024-03-01

## 2024-03-01 NOTE — TELEPHONE ENCOUNTER
Spoke with pt, wife was not available, did speak to him about getting any calories in he can, eat whatever he wants, encouraged the van to take them to [employer] twice a week and get things that he likes or sounds good...  Told him we would get back to him about any other suggestions next week when we talk to Dr Elise and our pharmacist about Vitamin or anything to increase appetite...  Back to you-

## 2024-03-01 NOTE — TELEPHONE ENCOUNTER
Home care  nurse calling.  Suhas's insurance will not cover nutritionist.  Asking what you suggest for his appetite?  Nurse asking if it is OK to try Elder Tonic liquid vitamin.  Please advise.

## 2024-03-01 NOTE — TELEPHONE ENCOUNTER
Spoke with home nurse and I will be calling pt and wife to discuss foods and have Vitamin info for Amita and Gosia-

## 2024-03-05 NOTE — TELEPHONE ENCOUNTER
I called and left a follow-up message with Ashley his wife.  I did ask her to call Amita Dover for additional suggestions.  I did leave some suggestions on the message as adding butter to foods and frozen orders eating small amounts throughout the day.  I can follow-up on this when I return to the office next week.

## 2024-03-06 NOTE — TELEPHONE ENCOUNTER
MICHELL Elise.  I did a little research on the Elder Tonic.  It contains the following:    -B1, 2, 6, and 12  -niacinamide  -zinc  -magnesium  -manganese  -Dexpanthenol    To me, it seems like a general multivitamin mix with emphasis on the B vitamins.  Eldertonic supplement itself does not have a website.  I'm unsure which component in there they think will increase the appetite.  After some googling, it might be the zinc and thiamine?  I don't know that it would do anything drastic for his appetite or for weight gain, but also, I don't think any of those are necessarily dangerous either. I suppose since it does contain some magnesium I would want to evaluate that given his cardiac history and use of amiodarone.  I guess, I'm in favor of a more food based approach than medications.  Does he like Ensure/Glucerna/Premier protein?  Maybe that would help?

## 2024-03-13 DIAGNOSIS — N40.0 BENIGN PROSTATIC HYPERPLASIA WITHOUT LOWER URINARY TRACT SYMPTOMS: ICD-10-CM

## 2024-03-13 DIAGNOSIS — I49.3 PVC'S (PREMATURE VENTRICULAR CONTRACTIONS): ICD-10-CM

## 2024-03-13 DIAGNOSIS — R00.1 BRADYCARDIA: ICD-10-CM

## 2024-03-13 DIAGNOSIS — I47.29 OTHER VENTRICULAR TACHYCARDIA (HCC): ICD-10-CM

## 2024-03-13 DIAGNOSIS — I50.42 CHRONIC COMBINED SYSTOLIC AND DIASTOLIC CONGESTIVE HEART FAILURE (HCC): ICD-10-CM

## 2024-03-13 RX ORDER — FUROSEMIDE 20 MG/1
20 TABLET ORAL DAILY
Qty: 90 TABLET | Refills: 1 | Status: SHIPPED | OUTPATIENT
Start: 2024-03-13

## 2024-03-13 RX ORDER — AMIODARONE HYDROCHLORIDE 200 MG/1
200 TABLET ORAL DAILY
Qty: 90 TABLET | Refills: 1 | Status: SHIPPED | OUTPATIENT
Start: 2024-03-13

## 2024-03-13 RX ORDER — ACETAMINOPHEN 325 MG/1
650 TABLET ORAL EVERY 6 HOURS PRN
Qty: 360 TABLET | Refills: 1 | Status: SHIPPED | OUTPATIENT
Start: 2024-03-13

## 2024-03-13 RX ORDER — FINASTERIDE 5 MG/1
5 TABLET, FILM COATED ORAL DAILY
Qty: 90 TABLET | Refills: 1 | Status: SHIPPED | OUTPATIENT
Start: 2024-03-13

## 2024-03-29 ENCOUNTER — TELEPHONE (OUTPATIENT)
Age: 89
End: 2024-03-29

## 2024-03-29 NOTE — TELEPHONE ENCOUNTER
Dorothea, nurse from [facility], calling.  Suhas is having off and on pain across stomach. When pain  comes on pain is at a 7--8.  No pain otherwise.  Nurse report soft, loose stool but not diarrhea, no red in stool.  Suhas trying to eat more. Please advise.

## 2024-04-01 NOTE — TELEPHONE ENCOUNTER
LMVM for pt to call us back if he was still having trouble-    LMVM for nurse also that we tried to call-

## 2024-04-02 ENCOUNTER — TELEPHONE (OUTPATIENT)
Age: 89
End: 2024-04-02

## 2024-04-02 NOTE — TELEPHONE ENCOUNTER
Jocelyn from [facility] calling to report Suhas fell on Sunday.  He is OK just banged up a little.  He has a 2 wheel walker that got caught on something.  Nurse asking if you could write him an order for a 4 wheeled rollator walker.  Please advise.    I asked where to send order and she said whatever DME we prefer.

## 2024-04-02 NOTE — TELEPHONE ENCOUNTER
I spoke with Janae the nurse.They have ordered skis for the back legs of his walker on [employer].  They should be here tomorrow.  This should solve the problem of the back legs of his walker getting caught on the carpet.  Follow-up on abdominal pain from last week.  He states that this is better.

## 2024-04-15 ENCOUNTER — TELEPHONE (OUTPATIENT)
Age: 89
End: 2024-04-15

## 2024-04-15 NOTE — TELEPHONE ENCOUNTER
I called and left a message on Michelle's phone for them to follow through with repeat chest x-ray next Monday.

## 2024-04-15 NOTE — TELEPHONE ENCOUNTER
Thi from [facility] trauma calling. Patient was at [facility] trauma due to a fall.  Needs repeat chest xray next Monday to follow up on pneumothorax.  Patient's wife said she refuses to bring him back for xray unless Dr Elise orders.    Per [facility], xray is already ordered.  They are asking for Dr Elise to call patients wife to encourage her to bring him in for repeat xray.  He is scheduled for Monday 4/22/24.

## 2024-04-19 ENCOUNTER — APPOINTMENT (OUTPATIENT)
Dept: GENERAL RADIOLOGY | Age: 89
DRG: 177 | End: 2024-04-19
Payer: COMMERCIAL

## 2024-04-19 ENCOUNTER — APPOINTMENT (OUTPATIENT)
Dept: CT IMAGING | Age: 89
DRG: 177 | End: 2024-04-19
Payer: COMMERCIAL

## 2024-04-19 ENCOUNTER — HOSPITAL ENCOUNTER (INPATIENT)
Age: 89
LOS: 10 days | Discharge: NURSING, REHAB OR LONG-TERM CARE | DRG: 177 | End: 2024-04-29
Attending: EMERGENCY MEDICINE | Admitting: FAMILY MEDICINE
Payer: COMMERCIAL

## 2024-04-19 DIAGNOSIS — J18.9 PNEUMONIA DUE TO INFECTIOUS ORGANISM, UNSPECIFIED LATERALITY, UNSPECIFIED PART OF LUNG: Primary | ICD-10-CM

## 2024-04-19 DIAGNOSIS — R53.1 GENERALIZED WEAKNESS: ICD-10-CM

## 2024-04-19 PROBLEM — I50.9 HEART FAILURE (HCC): Status: ACTIVE | Noted: 2024-04-19

## 2024-04-19 PROBLEM — J69.0 ASPIRATION PNEUMONIA (HCC): Status: ACTIVE | Noted: 2024-04-19

## 2024-04-19 PROBLEM — N17.9 AKI (ACUTE KIDNEY INJURY) (HCC): Status: ACTIVE | Noted: 2024-04-19

## 2024-04-19 PROBLEM — R13.19 OTHER DYSPHAGIA: Status: ACTIVE | Noted: 2024-04-19
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
## 2024-04-19 LAB
ALBUMIN SERPL-MCNC: 2.6 G/DL (ref 3.5–5.2)
ALBUMIN/GLOB SERPL: 0.7 {RATIO} (ref 1–2.5)
ALP SERPL-CCNC: 143 U/L (ref 40–129)
ALT SERPL-CCNC: 40 U/L (ref 5–41)
ANION GAP SERPL CALCULATED.3IONS-SCNC: 6 MMOL/L (ref 9–17)
AST SERPL-CCNC: 22 U/L
BACTERIA URNS QL MICRO: ABNORMAL
BASOPHILS # BLD: 0 K/UL (ref 0–0.2)
BASOPHILS NFR BLD: 1 % (ref 0–2)
BILIRUB DIRECT SERPL-MCNC: 0.3 MG/DL
BILIRUB INDIRECT SERPL-MCNC: 0.4 MG/DL (ref 0–1)
BILIRUB SERPL-MCNC: 0.7 MG/DL (ref 0.3–1.2)
BILIRUB UR QL STRIP: NEGATIVE
BNP SERPL-MCNC: 4363 PG/ML
BUN SERPL-MCNC: 29 MG/DL (ref 8–23)
CALCIUM SERPL-MCNC: 8.3 MG/DL (ref 8.6–10.4)
CHLORIDE SERPL-SCNC: 107 MMOL/L (ref 98–107)
CLARITY UR: CLEAR
CO2 SERPL-SCNC: 27 MMOL/L (ref 20–31)
COLOR UR: YELLOW
CREAT SERPL-MCNC: 1.3 MG/DL (ref 0.7–1.2)
CRITICAL ACTION: NORMAL
CRITICAL NOTIFICATION DATE/TIME: NORMAL
CRITICAL NOTIFICATION: NORMAL
CRITICAL VALUE READ BACK: YES
EKG ATRIAL RATE: 60 BPM
EKG P AXIS: 61 DEGREES
EKG P-R INTERVAL: 206 MS
EKG Q-T INTERVAL: 566 MS
EKG QRS DURATION: 120 MS
EKG QTC CALCULATION (BAZETT): 566 MS
EKG R AXIS: 62 DEGREES
EKG T AXIS: 105 DEGREES
EKG VENTRICULAR RATE: 60 BPM
EOSINOPHIL # BLD: 0 K/UL (ref 0–0.4)
EOSINOPHILS RELATIVE PERCENT: 1 % (ref 1–4)
EPI CELLS #/AREA URNS HPF: ABNORMAL /HPF (ref 0–5)
ERYTHROCYTE [DISTWIDTH] IN BLOOD BY AUTOMATED COUNT: 16.4 % (ref 12.5–15.4)
FIO2: 2
FLUAV AG SPEC QL: NEGATIVE
FLUBV AG SPEC QL: NEGATIVE
GFR SERPL CREATININE-BSD FRML MDRD: 50 ML/MIN/1.73M2
GLUCOSE SERPL-MCNC: 95 MG/DL (ref 70–99)
GLUCOSE UR STRIP-MCNC: NEGATIVE MG/DL
HCO3 VENOUS: 29.9 MMOL/L (ref 22–29)
HCT VFR BLD AUTO: 35.7 % (ref 41–53)
HGB BLD-MCNC: 12.1 G/DL (ref 13.5–17.5)
HGB UR QL STRIP.AUTO: NEGATIVE
KETONES UR STRIP-MCNC: NEGATIVE MG/DL
LACTATE BLDV-SCNC: 1.2 MMOL/L (ref 0.5–2.2)
LEUKOCYTE ESTERASE UR QL STRIP: NEGATIVE
LIPASE SERPL-CCNC: 38 U/L (ref 13–60)
LYMPHOCYTES NFR BLD: 1.8 K/UL (ref 1–4.8)
LYMPHOCYTES RELATIVE PERCENT: 21 % (ref 24–44)
MAGNESIUM SERPL-MCNC: 2.5 MG/DL (ref 1.6–2.6)
MCH RBC QN AUTO: 32.2 PG (ref 26–34)
MCHC RBC AUTO-ENTMCNC: 33.8 G/DL (ref 31–37)
MCV RBC AUTO: 95.1 FL (ref 80–100)
MONOCYTES NFR BLD: 0.9 K/UL (ref 0.1–1.2)
MONOCYTES NFR BLD: 10 % (ref 2–11)
MUCOUS THREADS URNS QL MICRO: ABNORMAL
NEUTROPHILS NFR BLD: 67 % (ref 36–66)
NEUTS SEG NFR BLD: 5.8 K/UL (ref 1.8–7.7)
NITRITE UR QL STRIP: NEGATIVE
O2 SAT, VEN: 37.8 % (ref 60–85)
PCO2, VEN: 53.7 MM HG (ref 41–51)
PH UR STRIP: 5.5 [PH] (ref 5–8)
PH VENOUS: 7.35 (ref 7.32–7.43)
PLATELET # BLD AUTO: 204 K/UL (ref 140–450)
PMV BLD AUTO: 9.2 FL (ref 6–12)
PO2, VEN: 23.7 MM HG (ref 30–50)
POSITIVE BASE EXCESS, VEN: 3.2 MMOL/L (ref 0–3)
POTASSIUM SERPL-SCNC: 3.9 MMOL/L (ref 3.7–5.3)
PROT SERPL-MCNC: 6.1 G/DL (ref 6.4–8.3)
PROT UR STRIP-MCNC: NEGATIVE MG/DL
RBC # BLD AUTO: 3.75 M/UL (ref 4.5–5.9)
RBC #/AREA URNS HPF: ABNORMAL /HPF (ref 0–2)
SARS-COV-2 RDRP RESP QL NAA+PROBE: NOT DETECTED
SODIUM SERPL-SCNC: 140 MMOL/L (ref 135–144)
SP GR UR STRIP: 1.02 (ref 1–1.03)
SPECIMEN DESCRIPTION: NORMAL
TROPONIN I SERPL HS-MCNC: 24 NG/L (ref 0–22)
TROPONIN I SERPL HS-MCNC: 25 NG/L (ref 0–22)
TSH SERPL DL<=0.05 MIU/L-ACNC: 2.42 UIU/ML (ref 0.3–5)
UROBILINOGEN UR STRIP-ACNC: NORMAL EU/DL (ref 0–1)
WBC #/AREA URNS HPF: ABNORMAL /HPF (ref 0–5)
WBC OTHER # BLD: 8.5 K/UL (ref 3.5–11)

## 2024-04-19 PROCEDURE — 70450 CT HEAD/BRAIN W/O DYE: CPT

## 2024-04-19 PROCEDURE — 51701 INSERT BLADDER CATHETER: CPT

## 2024-04-19 PROCEDURE — 84484 ASSAY OF TROPONIN QUANT: CPT

## 2024-04-19 PROCEDURE — 99222 1ST HOSP IP/OBS MODERATE 55: CPT | Performed by: FAMILY MEDICINE

## 2024-04-19 PROCEDURE — 82803 BLOOD GASES ANY COMBINATION: CPT

## 2024-04-19 PROCEDURE — 94761 N-INVAS EAR/PLS OXIMETRY MLT: CPT

## 2024-04-19 PROCEDURE — 83605 ASSAY OF LACTIC ACID: CPT

## 2024-04-19 PROCEDURE — 71250 CT THORAX DX C-: CPT

## 2024-04-19 PROCEDURE — 87635 SARS-COV-2 COVID-19 AMP PRB: CPT

## 2024-04-19 PROCEDURE — 2700000000 HC OXYGEN THERAPY PER DAY

## 2024-04-19 PROCEDURE — 99285 EMERGENCY DEPT VISIT HI MDM: CPT

## 2024-04-19 PROCEDURE — 2580000003 HC RX 258: Performed by: NURSE PRACTITIONER

## 2024-04-19 PROCEDURE — 83690 ASSAY OF LIPASE: CPT

## 2024-04-19 PROCEDURE — 93005 ELECTROCARDIOGRAM TRACING: CPT | Performed by: NURSE PRACTITIONER

## 2024-04-19 PROCEDURE — 80076 HEPATIC FUNCTION PANEL: CPT

## 2024-04-19 PROCEDURE — 85025 COMPLETE CBC W/AUTO DIFF WBC: CPT

## 2024-04-19 PROCEDURE — 84443 ASSAY THYROID STIM HORMONE: CPT

## 2024-04-19 PROCEDURE — 36415 COLL VENOUS BLD VENIPUNCTURE: CPT

## 2024-04-19 PROCEDURE — 1210000000 HC MED SURG R&B

## 2024-04-19 PROCEDURE — 6360000002 HC RX W HCPCS: Performed by: NURSE PRACTITIONER

## 2024-04-19 PROCEDURE — 87804 INFLUENZA ASSAY W/OPTIC: CPT

## 2024-04-19 PROCEDURE — 71045 X-RAY EXAM CHEST 1 VIEW: CPT

## 2024-04-19 PROCEDURE — 6370000000 HC RX 637 (ALT 250 FOR IP)

## 2024-04-19 PROCEDURE — 80048 BASIC METABOLIC PNL TOTAL CA: CPT

## 2024-04-19 PROCEDURE — 81001 URINALYSIS AUTO W/SCOPE: CPT

## 2024-04-19 PROCEDURE — 83735 ASSAY OF MAGNESIUM: CPT

## 2024-04-19 PROCEDURE — 83880 ASSAY OF NATRIURETIC PEPTIDE: CPT

## 2024-04-19 RX ORDER — POLYETHYLENE GLYCOL 3350 17 G/17G
17 POWDER, FOR SOLUTION ORAL DAILY PRN
Status: DISCONTINUED | OUTPATIENT
Start: 2024-04-19 | End: 2024-04-29 | Stop reason: HOSPADM

## 2024-04-19 RX ORDER — AMIODARONE HYDROCHLORIDE 200 MG/1
200 TABLET ORAL DAILY
Status: DISCONTINUED | OUTPATIENT
Start: 2024-04-20 | End: 2024-04-29 | Stop reason: HOSPADM

## 2024-04-19 RX ORDER — SODIUM CHLORIDE 9 MG/ML
INJECTION, SOLUTION INTRAVENOUS PRN
Status: DISCONTINUED | OUTPATIENT
Start: 2024-04-19 | End: 2024-04-29 | Stop reason: HOSPADM

## 2024-04-19 RX ORDER — ACETAMINOPHEN 650 MG/1
650 SUPPOSITORY RECTAL EVERY 6 HOURS PRN
Status: DISCONTINUED | OUTPATIENT
Start: 2024-04-19 | End: 2024-04-29 | Stop reason: HOSPADM

## 2024-04-19 RX ORDER — FINASTERIDE 5 MG/1
5 TABLET, FILM COATED ORAL DAILY
Status: DISCONTINUED | OUTPATIENT
Start: 2024-04-20 | End: 2024-04-29 | Stop reason: HOSPADM

## 2024-04-19 RX ORDER — 0.9 % SODIUM CHLORIDE 0.9 %
1000 INTRAVENOUS SOLUTION INTRAVENOUS ONCE
Status: COMPLETED | OUTPATIENT
Start: 2024-04-19 | End: 2024-04-19

## 2024-04-19 RX ORDER — ONDANSETRON 4 MG/1
4 TABLET, ORALLY DISINTEGRATING ORAL EVERY 8 HOURS PRN
Status: DISCONTINUED | OUTPATIENT
Start: 2024-04-19 | End: 2024-04-29 | Stop reason: HOSPADM

## 2024-04-19 RX ORDER — POTASSIUM CHLORIDE 7.45 MG/ML
10 INJECTION INTRAVENOUS PRN
Status: DISCONTINUED | OUTPATIENT
Start: 2024-04-19 | End: 2024-04-25

## 2024-04-19 RX ORDER — SODIUM CHLORIDE 0.9 % (FLUSH) 0.9 %
5-40 SYRINGE (ML) INJECTION PRN
Status: DISCONTINUED | OUTPATIENT
Start: 2024-04-19 | End: 2024-04-29 | Stop reason: HOSPADM

## 2024-04-19 RX ORDER — AMLODIPINE BESYLATE 5 MG/1
2.5 TABLET ORAL NIGHTLY
Status: DISCONTINUED | OUTPATIENT
Start: 2024-04-19 | End: 2024-04-20

## 2024-04-19 RX ORDER — ONDANSETRON 2 MG/ML
4 INJECTION INTRAMUSCULAR; INTRAVENOUS EVERY 6 HOURS PRN
Status: DISCONTINUED | OUTPATIENT
Start: 2024-04-19 | End: 2024-04-29 | Stop reason: HOSPADM

## 2024-04-19 RX ORDER — SODIUM CHLORIDE 0.9 % (FLUSH) 0.9 %
5-40 SYRINGE (ML) INJECTION EVERY 12 HOURS SCHEDULED
Status: DISCONTINUED | OUTPATIENT
Start: 2024-04-19 | End: 2024-04-29 | Stop reason: HOSPADM

## 2024-04-19 RX ORDER — FUROSEMIDE 20 MG/1
20 TABLET ORAL DAILY
Status: DISCONTINUED | OUTPATIENT
Start: 2024-04-20 | End: 2024-04-20

## 2024-04-19 RX ORDER — POTASSIUM CHLORIDE 20 MEQ/1
40 TABLET, EXTENDED RELEASE ORAL PRN
Status: DISCONTINUED | OUTPATIENT
Start: 2024-04-19 | End: 2024-04-25

## 2024-04-19 RX ORDER — LANOLIN ALCOHOL/MO/W.PET/CERES
3 CREAM (GRAM) TOPICAL NIGHTLY
Status: DISCONTINUED | OUTPATIENT
Start: 2024-04-19 | End: 2024-04-29 | Stop reason: HOSPADM

## 2024-04-19 RX ORDER — ECHINACEA PURPUREA EXTRACT 125 MG
1 TABLET ORAL PRN
Status: DISCONTINUED | OUTPATIENT
Start: 2024-04-19 | End: 2024-04-29 | Stop reason: HOSPADM

## 2024-04-19 RX ORDER — ACETAMINOPHEN 325 MG/1
650 TABLET ORAL EVERY 6 HOURS PRN
Status: DISCONTINUED | OUTPATIENT
Start: 2024-04-19 | End: 2024-04-19 | Stop reason: SDUPTHER

## 2024-04-19 RX ORDER — FLUTICASONE PROPIONATE 50 MCG
1 SPRAY, SUSPENSION (ML) NASAL DAILY
Status: DISCONTINUED | OUTPATIENT
Start: 2024-04-19 | End: 2024-04-29 | Stop reason: HOSPADM

## 2024-04-19 RX ORDER — ACETAMINOPHEN 325 MG/1
650 TABLET ORAL EVERY 6 HOURS PRN
Status: DISCONTINUED | OUTPATIENT
Start: 2024-04-19 | End: 2024-04-29 | Stop reason: HOSPADM

## 2024-04-19 RX ORDER — MAGNESIUM SULFATE IN WATER 40 MG/ML
2000 INJECTION, SOLUTION INTRAVENOUS PRN
Status: DISCONTINUED | OUTPATIENT
Start: 2024-04-19 | End: 2024-04-25

## 2024-04-19 RX ADMIN — AZITHROMYCIN MONOHYDRATE 500 MG: 500 INJECTION, POWDER, LYOPHILIZED, FOR SOLUTION INTRAVENOUS at 18:15

## 2024-04-19 RX ADMIN — Medication 3 MG: at 21:53

## 2024-04-19 RX ADMIN — ACETAMINOPHEN 650 MG: 325 TABLET ORAL at 21:53

## 2024-04-19 RX ADMIN — CEFTRIAXONE SODIUM 1000 MG: 1 INJECTION, POWDER, FOR SOLUTION INTRAMUSCULAR; INTRAVENOUS at 17:23

## 2024-04-19 RX ADMIN — SODIUM CHLORIDE 1000 ML: 9 INJECTION, SOLUTION INTRAVENOUS at 17:21

## 2024-04-19 NOTE — ED PROVIDER NOTES
[facility] Emergency Department  [address]  [place] [address]  Phone: [phone]  Fax: [phone]    Pt Name: Suhas Leon  MRN: [id number]  Birthdate 8/14/1925  Date of evaluation: 4/19/24    Suhas Leon is a 89 y.o. male with CC: Fatigue (Pt arrives by EMS from home with CC of sick. Weakness and fatigue. SOB. Occasional C/P. )      MDM:   89-year-old male who lives at independent living, arrives via EMS secondary to weakness, fatigue, shortness of breath, generalized malaise, failure to thrive.  He was recently just at [facility] secondary to fall with multisystem trauma, issues with his gallbladder.  Wife is present with patient but extremely hard of hearing and not a great historian.  Patient lethargic, is not providing much additional history.  Dry mucous membranes.  He has hematoma and ecchymosis to the left side of his forehead and temporal scalp.  He is overall very cachectic.  Heart regular rate and rhythm.  Lung sounds diminished bilaterally.  There is a cholecystostomy tube with insertion site clean dry and intact and the output is a mildly opaque greenish-brown, no jessica purulence.  No peripheral edema.  He does have extensive past medical history as well as a complicated hospitalization when review of his chart from [facility].  Stable anemia, mild OWEN.  BNP significantly elevated.  Chest x-ray showing some pulmonary congestion versus atelectasis versus pneumonia, will treat empirically with antibiotics.  However clinically he does appear dehydrated only 1 L normal saline ordered.  His prior EKG did show some nonspecific changes, these are present today but more pronounced.  Troponin mildly elevated at 25.  No leukocytosis, no lactic acidosis, afebrile.  Not meeting sepsis criteria and with his history of heart failure would not want to fluid overwhelm him anyways.  CT head not showing any traumatic injury, however there is a mastoid effusion on the left side.  I believe this 
Normocephalic.      Comments: Left temporal ecchymosis noted without significant swelling.     Right Ear: Tympanic membrane and external ear normal.      Left Ear: Tympanic membrane and external ear normal.      Nose: Nose normal.      Mouth/Throat:      Mouth: Mucous membranes are dry.   Eyes:      General:         Right eye: No discharge.         Left eye: No discharge.      Conjunctiva/sclera: Conjunctivae normal.   Cardiovascular:      Rate and Rhythm: Normal rate. Rhythm irregular.      Pulses: Normal pulses.      Heart sounds: Murmur heard.   Pulmonary:      Effort: Pulmonary effort is normal.      Breath sounds: Normal breath sounds.   Abdominal:      General: There is no distension.      Palpations: Abdomen is soft. There is no mass.      Tenderness: There is no abdominal tenderness. There is no guarding.      Hernia: No hernia is present.      Comments: cholecysto allergic to everything just cholecystostomy tube in place with clean dry intact dressing. Abd non tender, no peritoneal signs.   Musculoskeletal:         General: No deformity or signs of injury.      Cervical back: Normal range of motion and neck supple. No tenderness.      Right lower leg: No edema.      Left lower leg: No edema.      Comments: 3 out of 5 motor strength bilateral upper lower extremities   Skin:     General: Skin is warm and dry.      Capillary Refill: Capillary refill takes less than 2 seconds.   Neurological:      General: No focal deficit present.      Mental Status: He is alert and oriented to person, place, and time. Mental status is at baseline.      Gait: Gait normal.      Comments: Alert to verbal stimuli.  No focal motor or sensory deficits.  Oriented to person, place confused regarding time and circumstance.   Psychiatric:         Mood and Affect: Mood normal.         Behavior: Behavior normal.       DIAGNOSTIC RESULTS     EKG: All EKG's are interpreted by the Emergency Department Physician who either signs or Co-signs

## 2024-04-19 NOTE — H&P
assess patient's therapy needs at discharge and/or appropriate next level of care? Yes  Does patient have a healthcare DPOA? Not on file  Does patient have a living will?  Yes  Does patient have clear decision making capacity on admission? Yes   Based on limited initial assessment, is it anticipated that patient may have continued care needs at discharge? Yes, possible SNF because of weakness  Care after discharge was briefly discussed with patient and his wife and at this time they anticipate the following needs at discharge: Discharge with possible SNF if needed.       Patient is admitted as observation status. Expected length of stay < 48 hours. Patient is admitted in the Med/Surge    Patient was seen and discussed with MD Nahun Valdes MD  Family Medicine Resident, PGY-1   4/19/2024  6:22 PM    Senior Resident Attestation:    I have independently seen Suhas Leon and discussed the assessment and plan with the intern listed above and the attending Marcello Yeung MD. I have reviewed the note and have included any edits or additional information above.     Salah Awadalla, MD  Family Medicine Resident, PGY-2   4/19/2024  8:45 PM     Copy sent to Marcello Tipton MD

## 2024-04-20 LAB
ALBUMIN SERPL-MCNC: 2.2 G/DL (ref 3.5–5.2)
ALBUMIN/GLOB SERPL: 0.7 {RATIO} (ref 1–2.5)
ALP SERPL-CCNC: 112 U/L (ref 40–129)
ALT SERPL-CCNC: 29 U/L (ref 5–41)
ANION GAP SERPL CALCULATED.3IONS-SCNC: 6 MMOL/L (ref 9–17)
AST SERPL-CCNC: 17 U/L
BASOPHILS # BLD: 0 K/UL (ref 0–0.2)
BASOPHILS NFR BLD: 0 % (ref 0–2)
BILIRUB SERPL-MCNC: 0.4 MG/DL (ref 0.3–1.2)
BUN SERPL-MCNC: 24 MG/DL (ref 8–23)
CALCIUM SERPL-MCNC: 7.8 MG/DL (ref 8.6–10.4)
CHLORIDE SERPL-SCNC: 107 MMOL/L (ref 98–107)
CO2 SERPL-SCNC: 29 MMOL/L (ref 20–31)
CREAT SERPL-MCNC: 1.1 MG/DL (ref 0.7–1.2)
EOSINOPHIL # BLD: 0.1 K/UL (ref 0–0.4)
EOSINOPHILS RELATIVE PERCENT: 1 % (ref 1–4)
ERYTHROCYTE [DISTWIDTH] IN BLOOD BY AUTOMATED COUNT: 16.5 % (ref 12.5–15.4)
GFR SERPL CREATININE-BSD FRML MDRD: 61 ML/MIN/1.73M2
GLUCOSE SERPL-MCNC: 67 MG/DL (ref 70–99)
HCT VFR BLD AUTO: 30.2 % (ref 41–53)
HGB BLD-MCNC: 10.5 G/DL (ref 13.5–17.5)
LYMPHOCYTES NFR BLD: 1.5 K/UL (ref 1–4.8)
LYMPHOCYTES RELATIVE PERCENT: 22 % (ref 24–44)
MCH RBC QN AUTO: 33.1 PG (ref 26–34)
MCHC RBC AUTO-ENTMCNC: 34.7 G/DL (ref 31–37)
MCV RBC AUTO: 95.4 FL (ref 80–100)
MONOCYTES NFR BLD: 0.7 K/UL (ref 0.1–1.2)
MONOCYTES NFR BLD: 10 % (ref 2–11)
NEUTROPHILS NFR BLD: 67 % (ref 36–66)
NEUTS SEG NFR BLD: 4.5 K/UL (ref 1.8–7.7)
PLATELET # BLD AUTO: 192 K/UL (ref 140–450)
PMV BLD AUTO: 9 FL (ref 6–12)
POTASSIUM SERPL-SCNC: 3.9 MMOL/L (ref 3.7–5.3)
PROT SERPL-MCNC: 5.2 G/DL (ref 6.4–8.3)
RBC # BLD AUTO: 3.16 M/UL (ref 4.5–5.9)
SODIUM SERPL-SCNC: 142 MMOL/L (ref 135–144)
WBC OTHER # BLD: 6.8 K/UL (ref 3.5–11)

## 2024-04-20 PROCEDURE — 36415 COLL VENOUS BLD VENIPUNCTURE: CPT

## 2024-04-20 PROCEDURE — 97116 GAIT TRAINING THERAPY: CPT

## 2024-04-20 PROCEDURE — 97166 OT EVAL MOD COMPLEX 45 MIN: CPT

## 2024-04-20 PROCEDURE — 1210000000 HC MED SURG R&B

## 2024-04-20 PROCEDURE — 6370000000 HC RX 637 (ALT 250 FOR IP): Performed by: FAMILY MEDICINE

## 2024-04-20 PROCEDURE — 2580000003 HC RX 258

## 2024-04-20 PROCEDURE — 6370000000 HC RX 637 (ALT 250 FOR IP): Performed by: PHYSICIAN ASSISTANT

## 2024-04-20 PROCEDURE — 85025 COMPLETE CBC W/AUTO DIFF WBC: CPT

## 2024-04-20 PROCEDURE — 80053 COMPREHEN METABOLIC PANEL: CPT

## 2024-04-20 PROCEDURE — 6370000000 HC RX 637 (ALT 250 FOR IP)

## 2024-04-20 PROCEDURE — 6360000002 HC RX W HCPCS

## 2024-04-20 PROCEDURE — 97535 SELF CARE MNGMENT TRAINING: CPT

## 2024-04-20 PROCEDURE — 99221 1ST HOSP IP/OBS SF/LOW 40: CPT | Performed by: INTERNAL MEDICINE

## 2024-04-20 PROCEDURE — 97162 PT EVAL MOD COMPLEX 30 MIN: CPT

## 2024-04-20 RX ORDER — FUROSEMIDE 20 MG/1
20 TABLET ORAL 2 TIMES DAILY
Status: DISCONTINUED | OUTPATIENT
Start: 2024-04-20 | End: 2024-04-29 | Stop reason: HOSPADM

## 2024-04-20 RX ORDER — ALBUTEROL SULFATE 2.5 MG/3ML
2.5 SOLUTION RESPIRATORY (INHALATION) 4 TIMES DAILY PRN
Status: DISCONTINUED | OUTPATIENT
Start: 2024-04-20 | End: 2024-04-29 | Stop reason: HOSPADM

## 2024-04-20 RX ORDER — METOPROLOL SUCCINATE 25 MG/1
12.5 TABLET, EXTENDED RELEASE ORAL DAILY
Status: DISCONTINUED | OUTPATIENT
Start: 2024-04-20 | End: 2024-04-29 | Stop reason: HOSPADM

## 2024-04-20 RX ORDER — SPIRONOLACTONE 25 MG/1
25 TABLET ORAL DAILY
Status: DISCONTINUED | OUTPATIENT
Start: 2024-04-20 | End: 2024-04-29 | Stop reason: HOSPADM

## 2024-04-20 RX ORDER — FUROSEMIDE 10 MG/ML
40 INJECTION INTRAMUSCULAR; INTRAVENOUS ONCE
Status: COMPLETED | OUTPATIENT
Start: 2024-04-20 | End: 2024-04-20

## 2024-04-20 RX ORDER — FUROSEMIDE 10 MG/ML
20 INJECTION INTRAMUSCULAR; INTRAVENOUS DAILY
Status: DISCONTINUED | OUTPATIENT
Start: 2024-04-21 | End: 2024-04-29 | Stop reason: HOSPADM

## 2024-04-20 RX ADMIN — FUROSEMIDE 40 MG: 10 INJECTION, SOLUTION INTRAMUSCULAR; INTRAVENOUS at 13:11

## 2024-04-20 RX ADMIN — ACETAMINOPHEN 650 MG: 325 TABLET ORAL at 21:19

## 2024-04-20 RX ADMIN — EMPAGLIFLOZIN 10 MG: 10 TABLET, FILM COATED ORAL at 12:21

## 2024-04-20 RX ADMIN — FUROSEMIDE 20 MG: 20 TABLET ORAL at 08:49

## 2024-04-20 RX ADMIN — CEFTRIAXONE SODIUM 1000 MG: 1 INJECTION, POWDER, FOR SOLUTION INTRAMUSCULAR; INTRAVENOUS at 05:42

## 2024-04-20 RX ADMIN — FINASTERIDE 5 MG: 5 TABLET, FILM COATED ORAL at 08:49

## 2024-04-20 RX ADMIN — SPIRONOLACTONE 25 MG: 25 TABLET, FILM COATED ORAL at 12:21

## 2024-04-20 RX ADMIN — AMIODARONE HYDROCHLORIDE 200 MG: 200 TABLET ORAL at 08:49

## 2024-04-20 RX ADMIN — AMLODIPINE BESYLATE 2.5 MG: 5 TABLET ORAL at 00:17

## 2024-04-20 RX ADMIN — SODIUM CHLORIDE, PRESERVATIVE FREE 10 ML: 5 INJECTION INTRAVENOUS at 21:19

## 2024-04-20 RX ADMIN — Medication 3 MG: at 21:19

## 2024-04-20 RX ADMIN — METOPROLOL TARTRATE 12.5 MG: 25 TABLET, FILM COATED ORAL at 08:49

## 2024-04-20 RX ADMIN — FLUTICASONE PROPIONATE 1 SPRAY: 50 SPRAY, METERED NASAL at 08:49

## 2024-04-20 RX ADMIN — AZITHROMYCIN MONOHYDRATE 500 MG: 500 INJECTION, POWDER, LYOPHILIZED, FOR SOLUTION INTRAVENOUS at 06:23

## 2024-04-21 PROBLEM — R63.6 UNDERWEIGHT: Status: ACTIVE | Noted: 2024-04-21

## 2024-04-21 PROBLEM — N17.9 AKI (ACUTE KIDNEY INJURY) (HCC): Status: RESOLVED | Noted: 2024-04-19 | Resolved: 2024-04-21

## 2024-04-21 LAB
ANION GAP SERPL CALCULATED.3IONS-SCNC: 6 MMOL/L (ref 9–17)
BASOPHILS # BLD: 0 K/UL (ref 0–0.2)
BASOPHILS NFR BLD: 1 % (ref 0–2)
BUN SERPL-MCNC: 20 MG/DL (ref 8–23)
CALCIUM SERPL-MCNC: 7.6 MG/DL (ref 8.6–10.4)
CHLORIDE SERPL-SCNC: 104 MMOL/L (ref 98–107)
CO2 SERPL-SCNC: 30 MMOL/L (ref 20–31)
CREAT SERPL-MCNC: 1.3 MG/DL (ref 0.7–1.2)
EOSINOPHIL # BLD: 0 K/UL (ref 0–0.4)
EOSINOPHILS RELATIVE PERCENT: 1 % (ref 1–4)
ERYTHROCYTE [DISTWIDTH] IN BLOOD BY AUTOMATED COUNT: 16.4 % (ref 12.5–15.4)
GFR SERPL CREATININE-BSD FRML MDRD: 50 ML/MIN/1.73M2
GLUCOSE SERPL-MCNC: 95 MG/DL (ref 70–99)
HCT VFR BLD AUTO: 29.8 % (ref 41–53)
HGB BLD-MCNC: 10.2 G/DL (ref 13.5–17.5)
LYMPHOCYTES NFR BLD: 1.9 K/UL (ref 1–4.8)
LYMPHOCYTES RELATIVE PERCENT: 22 % (ref 24–44)
MCH RBC QN AUTO: 32.7 PG (ref 26–34)
MCHC RBC AUTO-ENTMCNC: 34.1 G/DL (ref 31–37)
MCV RBC AUTO: 95.9 FL (ref 80–100)
MONOCYTES NFR BLD: 0.9 K/UL (ref 0.1–1.2)
MONOCYTES NFR BLD: 11 % (ref 2–11)
NEUTROPHILS NFR BLD: 65 % (ref 36–66)
NEUTS SEG NFR BLD: 5.6 K/UL (ref 1.8–7.7)
PLATELET # BLD AUTO: 226 K/UL (ref 140–450)
PMV BLD AUTO: 8.7 FL (ref 6–12)
POTASSIUM SERPL-SCNC: 3.7 MMOL/L (ref 3.7–5.3)
RBC # BLD AUTO: 3.11 M/UL (ref 4.5–5.9)
SODIUM SERPL-SCNC: 140 MMOL/L (ref 135–144)
WBC OTHER # BLD: 8.5 K/UL (ref 3.5–11)

## 2024-04-21 PROCEDURE — 92610 EVALUATE SWALLOWING FUNCTION: CPT

## 2024-04-21 PROCEDURE — 6360000002 HC RX W HCPCS

## 2024-04-21 PROCEDURE — 97535 SELF CARE MNGMENT TRAINING: CPT

## 2024-04-21 PROCEDURE — 99232 SBSQ HOSP IP/OBS MODERATE 35: CPT | Performed by: FAMILY MEDICINE

## 2024-04-21 PROCEDURE — 2580000003 HC RX 258

## 2024-04-21 PROCEDURE — 94760 N-INVAS EAR/PLS OXIMETRY 1: CPT

## 2024-04-21 PROCEDURE — 97530 THERAPEUTIC ACTIVITIES: CPT

## 2024-04-21 PROCEDURE — 6370000000 HC RX 637 (ALT 250 FOR IP)

## 2024-04-21 PROCEDURE — 2700000000 HC OXYGEN THERAPY PER DAY

## 2024-04-21 PROCEDURE — 36415 COLL VENOUS BLD VENIPUNCTURE: CPT

## 2024-04-21 PROCEDURE — 80048 BASIC METABOLIC PNL TOTAL CA: CPT

## 2024-04-21 PROCEDURE — 6370000000 HC RX 637 (ALT 250 FOR IP): Performed by: PHYSICIAN ASSISTANT

## 2024-04-21 PROCEDURE — 6360000002 HC RX W HCPCS: Performed by: STUDENT IN AN ORGANIZED HEALTH CARE EDUCATION/TRAINING PROGRAM

## 2024-04-21 PROCEDURE — 85025 COMPLETE CBC W/AUTO DIFF WBC: CPT

## 2024-04-21 PROCEDURE — 94640 AIRWAY INHALATION TREATMENT: CPT

## 2024-04-21 PROCEDURE — 1210000000 HC MED SURG R&B

## 2024-04-21 RX ORDER — LISINOPRIL 5 MG/1
2.5 TABLET ORAL DAILY
Status: DISCONTINUED | OUTPATIENT
Start: 2024-04-21 | End: 2024-04-29 | Stop reason: HOSPADM

## 2024-04-21 RX ADMIN — FLUTICASONE PROPIONATE 1 SPRAY: 50 SPRAY, METERED NASAL at 09:08

## 2024-04-21 RX ADMIN — Medication 3 MG: at 20:47

## 2024-04-21 RX ADMIN — METOPROLOL SUCCINATE 12.5 MG: 25 TABLET, EXTENDED RELEASE ORAL at 09:07

## 2024-04-21 RX ADMIN — SODIUM CHLORIDE, PRESERVATIVE FREE 10 ML: 5 INJECTION INTRAVENOUS at 09:08

## 2024-04-21 RX ADMIN — EMPAGLIFLOZIN 10 MG: 10 TABLET, FILM COATED ORAL at 09:07

## 2024-04-21 RX ADMIN — CEFTRIAXONE SODIUM 1000 MG: 1 INJECTION, POWDER, FOR SOLUTION INTRAMUSCULAR; INTRAVENOUS at 06:14

## 2024-04-21 RX ADMIN — ALBUTEROL SULFATE 2.5 MG: 2.5 SOLUTION RESPIRATORY (INHALATION) at 00:17

## 2024-04-21 RX ADMIN — FUROSEMIDE 20 MG: 10 INJECTION, SOLUTION INTRAMUSCULAR; INTRAVENOUS at 09:08

## 2024-04-21 RX ADMIN — ACETAMINOPHEN 650 MG: 325 TABLET ORAL at 20:47

## 2024-04-21 RX ADMIN — AZITHROMYCIN MONOHYDRATE 500 MG: 500 INJECTION, POWDER, LYOPHILIZED, FOR SOLUTION INTRAVENOUS at 06:45

## 2024-04-21 RX ADMIN — SPIRONOLACTONE 25 MG: 25 TABLET, FILM COATED ORAL at 09:07

## 2024-04-21 RX ADMIN — FINASTERIDE 5 MG: 5 TABLET, FILM COATED ORAL at 09:07

## 2024-04-21 RX ADMIN — LISINOPRIL 2.5 MG: 5 TABLET ORAL at 17:44

## 2024-04-21 RX ADMIN — AMIODARONE HYDROCHLORIDE 200 MG: 200 TABLET ORAL at 09:07

## 2024-04-21 RX ADMIN — SODIUM CHLORIDE, PRESERVATIVE FREE 10 ML: 5 INJECTION INTRAVENOUS at 20:47

## 2024-04-22 LAB
ANION GAP SERPL CALCULATED.3IONS-SCNC: 2 MMOL/L (ref 9–17)
BASOPHILS # BLD: 0 K/UL (ref 0–0.2)
BASOPHILS NFR BLD: 1 % (ref 0–2)
BUN SERPL-MCNC: 20 MG/DL (ref 8–23)
CALCIUM SERPL-MCNC: 7.9 MG/DL (ref 8.6–10.4)
CHLORIDE SERPL-SCNC: 102 MMOL/L (ref 98–107)
CO2 SERPL-SCNC: 34 MMOL/L (ref 20–31)
CREAT SERPL-MCNC: 1 MG/DL (ref 0.7–1.2)
EOSINOPHIL # BLD: 0.1 K/UL (ref 0–0.4)
EOSINOPHILS RELATIVE PERCENT: 1 % (ref 1–4)
ERYTHROCYTE [DISTWIDTH] IN BLOOD BY AUTOMATED COUNT: 16.1 % (ref 12.5–15.4)
GFR SERPL CREATININE-BSD FRML MDRD: 68 ML/MIN/1.73M2
GLUCOSE SERPL-MCNC: 93 MG/DL (ref 70–99)
HCT VFR BLD AUTO: 31 % (ref 41–53)
HGB BLD-MCNC: 10.7 G/DL (ref 13.5–17.5)
LYMPHOCYTES NFR BLD: 1.6 K/UL (ref 1–4.8)
LYMPHOCYTES RELATIVE PERCENT: 17 % (ref 24–44)
MCH RBC QN AUTO: 32.7 PG (ref 26–34)
MCHC RBC AUTO-ENTMCNC: 34.5 G/DL (ref 31–37)
MCV RBC AUTO: 94.9 FL (ref 80–100)
MONOCYTES NFR BLD: 0.9 K/UL (ref 0.1–1.2)
MONOCYTES NFR BLD: 10 % (ref 2–11)
NEUTROPHILS NFR BLD: 71 % (ref 36–66)
NEUTS SEG NFR BLD: 6.7 K/UL (ref 1.8–7.7)
PLATELET # BLD AUTO: 263 K/UL (ref 140–450)
PMV BLD AUTO: 9.1 FL (ref 6–12)
POTASSIUM SERPL-SCNC: 3.9 MMOL/L (ref 3.7–5.3)
RBC # BLD AUTO: 3.27 M/UL (ref 4.5–5.9)
SODIUM SERPL-SCNC: 138 MMOL/L (ref 135–144)
WBC OTHER # BLD: 9.2 K/UL (ref 3.5–11)

## 2024-04-22 PROCEDURE — 99233 SBSQ HOSP IP/OBS HIGH 50: CPT | Performed by: INTERNAL MEDICINE

## 2024-04-22 PROCEDURE — 85025 COMPLETE CBC W/AUTO DIFF WBC: CPT

## 2024-04-22 PROCEDURE — 99232 SBSQ HOSP IP/OBS MODERATE 35: CPT | Performed by: FAMILY MEDICINE

## 2024-04-22 PROCEDURE — 80048 BASIC METABOLIC PNL TOTAL CA: CPT

## 2024-04-22 PROCEDURE — 36415 COLL VENOUS BLD VENIPUNCTURE: CPT

## 2024-04-22 PROCEDURE — 2580000003 HC RX 258

## 2024-04-22 PROCEDURE — 6370000000 HC RX 637 (ALT 250 FOR IP): Performed by: FAMILY MEDICINE

## 2024-04-22 PROCEDURE — 97530 THERAPEUTIC ACTIVITIES: CPT

## 2024-04-22 PROCEDURE — 1210000000 HC MED SURG R&B

## 2024-04-22 PROCEDURE — 6370000000 HC RX 637 (ALT 250 FOR IP)

## 2024-04-22 PROCEDURE — 97110 THERAPEUTIC EXERCISES: CPT

## 2024-04-22 PROCEDURE — 6360000002 HC RX W HCPCS

## 2024-04-22 PROCEDURE — 6370000000 HC RX 637 (ALT 250 FOR IP): Performed by: PHYSICIAN ASSISTANT

## 2024-04-22 PROCEDURE — 97535 SELF CARE MNGMENT TRAINING: CPT

## 2024-04-22 RX ORDER — LISINOPRIL 5 MG/1
2.5 TABLET ORAL ONCE
Status: COMPLETED | OUTPATIENT
Start: 2024-04-22 | End: 2024-04-22

## 2024-04-22 RX ADMIN — EMPAGLIFLOZIN 10 MG: 10 TABLET, FILM COATED ORAL at 08:58

## 2024-04-22 RX ADMIN — AMIODARONE HYDROCHLORIDE 200 MG: 200 TABLET ORAL at 08:57

## 2024-04-22 RX ADMIN — LISINOPRIL 2.5 MG: 5 TABLET ORAL at 08:57

## 2024-04-22 RX ADMIN — FUROSEMIDE 20 MG: 10 INJECTION, SOLUTION INTRAMUSCULAR; INTRAVENOUS at 08:57

## 2024-04-22 RX ADMIN — SODIUM CHLORIDE, PRESERVATIVE FREE 10 ML: 5 INJECTION INTRAVENOUS at 21:26

## 2024-04-22 RX ADMIN — Medication 3 MG: at 21:26

## 2024-04-22 RX ADMIN — ONDANSETRON 4 MG: 2 INJECTION INTRAMUSCULAR; INTRAVENOUS at 03:58

## 2024-04-22 RX ADMIN — SODIUM CHLORIDE, PRESERVATIVE FREE 10 ML: 5 INJECTION INTRAVENOUS at 08:58

## 2024-04-22 RX ADMIN — AZITHROMYCIN MONOHYDRATE 500 MG: 500 INJECTION, POWDER, LYOPHILIZED, FOR SOLUTION INTRAVENOUS at 06:52

## 2024-04-22 RX ADMIN — METOPROLOL SUCCINATE 12.5 MG: 25 TABLET, EXTENDED RELEASE ORAL at 08:57

## 2024-04-22 RX ADMIN — CEFTRIAXONE SODIUM 1000 MG: 1 INJECTION, POWDER, FOR SOLUTION INTRAMUSCULAR; INTRAVENOUS at 06:13

## 2024-04-22 RX ADMIN — LISINOPRIL 2.5 MG: 5 TABLET ORAL at 01:04

## 2024-04-22 RX ADMIN — SPIRONOLACTONE 25 MG: 25 TABLET, FILM COATED ORAL at 08:58

## 2024-04-22 RX ADMIN — FLUTICASONE PROPIONATE 1 SPRAY: 50 SPRAY, METERED NASAL at 08:57

## 2024-04-22 RX ADMIN — FINASTERIDE 5 MG: 5 TABLET, FILM COATED ORAL at 08:58

## 2024-04-23 ENCOUNTER — APPOINTMENT (OUTPATIENT)
Dept: GENERAL RADIOLOGY | Age: 89
DRG: 177 | End: 2024-04-23
Payer: COMMERCIAL

## 2024-04-23 LAB
ANION GAP SERPL CALCULATED.3IONS-SCNC: 5 MMOL/L (ref 9–17)
BASOPHILS # BLD: 0.1 K/UL (ref 0–0.2)
BASOPHILS NFR BLD: 0 % (ref 0–2)
BUN SERPL-MCNC: 20 MG/DL (ref 8–23)
CALCIUM SERPL-MCNC: 8.3 MG/DL (ref 8.6–10.4)
CHLORIDE SERPL-SCNC: 99 MMOL/L (ref 98–107)
CO2 SERPL-SCNC: 31 MMOL/L (ref 20–31)
CREAT SERPL-MCNC: 0.8 MG/DL (ref 0.7–1.2)
EOSINOPHIL # BLD: 0.1 K/UL (ref 0–0.4)
EOSINOPHILS RELATIVE PERCENT: 1 % (ref 1–4)
ERYTHROCYTE [DISTWIDTH] IN BLOOD BY AUTOMATED COUNT: 15.8 % (ref 12.5–15.4)
GFR SERPL CREATININE-BSD FRML MDRD: 80 ML/MIN/1.73M2
GLUCOSE SERPL-MCNC: 83 MG/DL (ref 70–99)
HCT VFR BLD AUTO: 34.5 % (ref 41–53)
HGB BLD-MCNC: 11.4 G/DL (ref 13.5–17.5)
LYMPHOCYTES NFR BLD: 1.5 K/UL (ref 1–4.8)
LYMPHOCYTES RELATIVE PERCENT: 11 % (ref 24–44)
MCH RBC QN AUTO: 31.7 PG (ref 26–34)
MCHC RBC AUTO-ENTMCNC: 33.1 G/DL (ref 31–37)
MCV RBC AUTO: 95.7 FL (ref 80–100)
MONOCYTES NFR BLD: 1 K/UL (ref 0.1–1.2)
MONOCYTES NFR BLD: 7 % (ref 2–11)
NEUTROPHILS NFR BLD: 81 % (ref 36–66)
NEUTS SEG NFR BLD: 11.8 K/UL (ref 1.8–7.7)
PLATELET # BLD AUTO: 270 K/UL (ref 140–450)
PMV BLD AUTO: 9 FL (ref 6–12)
POTASSIUM SERPL-SCNC: 4.4 MMOL/L (ref 3.7–5.3)
RBC # BLD AUTO: 3.6 M/UL (ref 4.5–5.9)
SODIUM SERPL-SCNC: 135 MMOL/L (ref 135–144)
WBC OTHER # BLD: 14.4 K/UL (ref 3.5–11)

## 2024-04-23 PROCEDURE — 71045 X-RAY EXAM CHEST 1 VIEW: CPT

## 2024-04-23 PROCEDURE — 1210000000 HC MED SURG R&B

## 2024-04-23 PROCEDURE — 99232 SBSQ HOSP IP/OBS MODERATE 35: CPT | Performed by: FAMILY MEDICINE

## 2024-04-23 PROCEDURE — 6360000002 HC RX W HCPCS

## 2024-04-23 PROCEDURE — 36415 COLL VENOUS BLD VENIPUNCTURE: CPT

## 2024-04-23 PROCEDURE — 97110 THERAPEUTIC EXERCISES: CPT

## 2024-04-23 PROCEDURE — 85025 COMPLETE CBC W/AUTO DIFF WBC: CPT

## 2024-04-23 PROCEDURE — 97530 THERAPEUTIC ACTIVITIES: CPT

## 2024-04-23 PROCEDURE — 6370000000 HC RX 637 (ALT 250 FOR IP): Performed by: PHYSICIAN ASSISTANT

## 2024-04-23 PROCEDURE — 97535 SELF CARE MNGMENT TRAINING: CPT

## 2024-04-23 PROCEDURE — 6370000000 HC RX 637 (ALT 250 FOR IP)

## 2024-04-23 PROCEDURE — 99233 SBSQ HOSP IP/OBS HIGH 50: CPT | Performed by: INTERNAL MEDICINE

## 2024-04-23 PROCEDURE — 80048 BASIC METABOLIC PNL TOTAL CA: CPT

## 2024-04-23 PROCEDURE — 2700000000 HC OXYGEN THERAPY PER DAY

## 2024-04-23 PROCEDURE — 2580000003 HC RX 258

## 2024-04-23 PROCEDURE — 94760 N-INVAS EAR/PLS OXIMETRY 1: CPT

## 2024-04-23 RX ADMIN — SODIUM CHLORIDE, PRESERVATIVE FREE 10 ML: 5 INJECTION INTRAVENOUS at 22:15

## 2024-04-23 RX ADMIN — AMIODARONE HYDROCHLORIDE 200 MG: 200 TABLET ORAL at 08:50

## 2024-04-23 RX ADMIN — EMPAGLIFLOZIN 10 MG: 10 TABLET, FILM COATED ORAL at 08:49

## 2024-04-23 RX ADMIN — SODIUM CHLORIDE, PRESERVATIVE FREE 10 ML: 5 INJECTION INTRAVENOUS at 08:50

## 2024-04-23 RX ADMIN — FINASTERIDE 5 MG: 5 TABLET, FILM COATED ORAL at 08:49

## 2024-04-23 RX ADMIN — Medication 3 MG: at 22:15

## 2024-04-23 RX ADMIN — CEFTRIAXONE SODIUM 1000 MG: 1 INJECTION, POWDER, FOR SOLUTION INTRAMUSCULAR; INTRAVENOUS at 05:58

## 2024-04-23 RX ADMIN — METOPROLOL SUCCINATE 12.5 MG: 25 TABLET, EXTENDED RELEASE ORAL at 08:50

## 2024-04-23 RX ADMIN — LISINOPRIL 2.5 MG: 5 TABLET ORAL at 08:50

## 2024-04-23 RX ADMIN — SPIRONOLACTONE 25 MG: 25 TABLET, FILM COATED ORAL at 08:50

## 2024-04-23 RX ADMIN — FLUTICASONE PROPIONATE 1 SPRAY: 50 SPRAY, METERED NASAL at 08:52

## 2024-04-23 RX ADMIN — FUROSEMIDE 20 MG: 10 INJECTION, SOLUTION INTRAMUSCULAR; INTRAVENOUS at 08:50

## 2024-04-24 PROBLEM — T85.518A CHOLECYSTOSTOMY TUBE DYSFUNCTION: Status: ACTIVE | Noted: 2024-04-24

## 2024-04-24 LAB
ANION GAP SERPL CALCULATED.3IONS-SCNC: 5 MMOL/L (ref 9–17)
BASOPHILS # BLD: 0 K/UL (ref 0–0.2)
BASOPHILS NFR BLD: 0 % (ref 0–2)
BNP SERPL-MCNC: 3380 PG/ML
BUN SERPL-MCNC: 17 MG/DL (ref 8–23)
CALCIUM SERPL-MCNC: 8 MG/DL (ref 8.6–10.4)
CHLORIDE SERPL-SCNC: 99 MMOL/L (ref 98–107)
CO2 SERPL-SCNC: 33 MMOL/L (ref 20–31)
CREAT SERPL-MCNC: 0.9 MG/DL (ref 0.7–1.2)
EOSINOPHIL # BLD: 0.1 K/UL (ref 0–0.4)
EOSINOPHILS RELATIVE PERCENT: 1 % (ref 1–4)
ERYTHROCYTE [DISTWIDTH] IN BLOOD BY AUTOMATED COUNT: 16.4 % (ref 12.5–15.4)
GFR SERPL CREATININE-BSD FRML MDRD: 77 ML/MIN/1.73M2
GLUCOSE SERPL-MCNC: 90 MG/DL (ref 70–99)
HCT VFR BLD AUTO: 32.5 % (ref 41–53)
HGB BLD-MCNC: 11.1 G/DL (ref 13.5–17.5)
LYMPHOCYTES NFR BLD: 2.1 K/UL (ref 1–4.8)
LYMPHOCYTES RELATIVE PERCENT: 18 % (ref 24–44)
MCH RBC QN AUTO: 32.3 PG (ref 26–34)
MCHC RBC AUTO-ENTMCNC: 34 G/DL (ref 31–37)
MCV RBC AUTO: 95 FL (ref 80–100)
MONOCYTES NFR BLD: 1 K/UL (ref 0.1–1.2)
MONOCYTES NFR BLD: 9 % (ref 2–11)
NEUTROPHILS NFR BLD: 72 % (ref 36–66)
NEUTS SEG NFR BLD: 8.3 K/UL (ref 1.8–7.7)
PLATELET # BLD AUTO: 308 K/UL (ref 140–450)
PMV BLD AUTO: 8.7 FL (ref 6–12)
POTASSIUM SERPL-SCNC: 4 MMOL/L (ref 3.7–5.3)
RBC # BLD AUTO: 3.42 M/UL (ref 4.5–5.9)
SODIUM SERPL-SCNC: 137 MMOL/L (ref 135–144)
WBC OTHER # BLD: 11.5 K/UL (ref 3.5–11)

## 2024-04-24 PROCEDURE — 36415 COLL VENOUS BLD VENIPUNCTURE: CPT

## 2024-04-24 PROCEDURE — 99232 SBSQ HOSP IP/OBS MODERATE 35: CPT | Performed by: FAMILY MEDICINE

## 2024-04-24 PROCEDURE — 6370000000 HC RX 637 (ALT 250 FOR IP): Performed by: PHYSICIAN ASSISTANT

## 2024-04-24 PROCEDURE — 83880 ASSAY OF NATRIURETIC PEPTIDE: CPT

## 2024-04-24 PROCEDURE — 99232 SBSQ HOSP IP/OBS MODERATE 35: CPT | Performed by: INTERNAL MEDICINE

## 2024-04-24 PROCEDURE — 1210000000 HC MED SURG R&B

## 2024-04-24 PROCEDURE — 97535 SELF CARE MNGMENT TRAINING: CPT

## 2024-04-24 PROCEDURE — 99233 SBSQ HOSP IP/OBS HIGH 50: CPT | Performed by: SURGERY

## 2024-04-24 PROCEDURE — 2580000003 HC RX 258

## 2024-04-24 PROCEDURE — 6370000000 HC RX 637 (ALT 250 FOR IP)

## 2024-04-24 PROCEDURE — 80048 BASIC METABOLIC PNL TOTAL CA: CPT

## 2024-04-24 PROCEDURE — 6360000002 HC RX W HCPCS

## 2024-04-24 PROCEDURE — 85025 COMPLETE CBC W/AUTO DIFF WBC: CPT

## 2024-04-24 PROCEDURE — 92526 ORAL FUNCTION THERAPY: CPT

## 2024-04-24 RX ADMIN — FLUTICASONE PROPIONATE 1 SPRAY: 50 SPRAY, METERED NASAL at 08:58

## 2024-04-24 RX ADMIN — SODIUM CHLORIDE, PRESERVATIVE FREE 10 ML: 5 INJECTION INTRAVENOUS at 08:58

## 2024-04-24 RX ADMIN — SPIRONOLACTONE 25 MG: 25 TABLET, FILM COATED ORAL at 08:58

## 2024-04-24 RX ADMIN — FUROSEMIDE 20 MG: 10 INJECTION, SOLUTION INTRAMUSCULAR; INTRAVENOUS at 08:58

## 2024-04-24 RX ADMIN — EMPAGLIFLOZIN 10 MG: 10 TABLET, FILM COATED ORAL at 08:57

## 2024-04-24 RX ADMIN — AMIODARONE HYDROCHLORIDE 200 MG: 200 TABLET ORAL at 08:57

## 2024-04-24 RX ADMIN — METOPROLOL SUCCINATE 12.5 MG: 25 TABLET, EXTENDED RELEASE ORAL at 08:58

## 2024-04-24 RX ADMIN — Medication 3 MG: at 22:37

## 2024-04-24 RX ADMIN — ACETAMINOPHEN 650 MG: 325 TABLET ORAL at 22:37

## 2024-04-24 RX ADMIN — LISINOPRIL 2.5 MG: 5 TABLET ORAL at 08:57

## 2024-04-24 RX ADMIN — CEFTRIAXONE SODIUM 1000 MG: 1 INJECTION, POWDER, FOR SOLUTION INTRAMUSCULAR; INTRAVENOUS at 06:51

## 2024-04-24 RX ADMIN — FINASTERIDE 5 MG: 5 TABLET, FILM COATED ORAL at 08:57

## 2024-04-24 ASSESSMENT — PAIN DESCRIPTION - DESCRIPTORS: DESCRIPTORS: DISCOMFORT

## 2024-04-24 ASSESSMENT — PAIN DESCRIPTION - LOCATION: LOCATION: ABDOMEN

## 2024-04-24 ASSESSMENT — PAIN DESCRIPTION - ORIENTATION: ORIENTATION: LEFT

## 2024-04-24 ASSESSMENT — PAIN SCALES - GENERAL
PAINLEVEL_OUTOF10: 4
PAINLEVEL_OUTOF10: 2

## 2024-04-24 NOTE — CONSULTS
REASON FOR THE CONSULTATION: pleural effusion     HISTORY OF PRESENT ILLNESS:    Suhas Leon is a 89 y.o. year old male here for evaluation of weakness, fatigue, concern for pneumonia. He was recently discharged from [facility] and being brought from his assisted living facility for weakness and fatigue. CT imaging done in the ED is concerning for moderate right and small left pleural effusion. Pulmonary being consulted for further recommendation. CT head without acute process. Echo with LVEF 20-25%, cardiology consulted. Infectious work up obtained and patient started on antibiotics. Patient does not report any chest pain, shortness of breath. No fever or chills. He does report coughing and choking with food.         PAST MEDICAL HISTORY:       Diagnosis Date    BPH (benign prostatic hyperplasia)     Cataracts, bilateral     COPD (chronic obstructive pulmonary disease) ([facility])     Former smoker     Hearing loss     History of intermittent claudication     Hyperlipidemia     Intestinal infection due to Clostridium difficile 7/16/2019    OA (osteoarthritis) of knee     Pneumonia     Prediabetes     Spinal stenosis        SURGICAL HISTORY:   Past Surgical History:   Procedure Laterality Date    CORONARY ARTERY BYPASS GRAFT  1989    EYE SURGERY  1991    Cataracts    KNEE SURGERY Bilateral     TONSILLECTOMY  1931       ALLERGIES:  No Known Allergies    MEDICATIONS:   Current Facility-Administered Medications   Medication Dose Route Frequency Provider Last Rate Last Admin    sodium chloride flush 0.9 % injection 5-40 mL  5-40 mL IntraVENous 2 times per day Nahun Carter MD        sodium chloride flush 0.9 % injection 5-40 mL  5-40 mL IntraVENous PRN Nahun Carter MD        0.9 % sodium chloride infusion   IntraVENous PRN Nahun Carter MD        ondansetron (ZOFRAN-ODT) disintegrating tablet 4 mg  4 mg Oral Q8H PRN Nahun Carter MD        Or    ondansetron (ZOFRAN) injection 4 mg  4 mg IntraVENous Q6H PRN Raul 
accessory muscle use.   No audible rales, rhonchi, or wheezing.  Cardiovascular: S1S2. No evidence of JVD. No evidence of pulsatile masses in abdomen  Abdomen:  Soft, nontender nondistended, cholecystostomy tube present with some bilious drainage in the collection bag.  Musculoskeletal: No evidence of bony/muscular deformities, trauma, atrophy of either left/right upper/lower extremity. No evidence of digital clubbing or cyanosis.  Neurologic:  CN 2-12 grossly intact without obvious deficits. Grossly normal sensation in all extremities.  Psychiatric: appropriate judgement and insight, appropriate recall of recent and remote memory, no evidence of depression/anxiety/agitation    Labs:   CBC:   Recent Labs     04/21/24  0840 04/22/24  0635 04/23/24  0659   WBC 8.5 9.2 14.4*   HGB 10.2* 10.7* 11.4*    263 270     BMP:    Recent Labs     04/21/24  0840 04/22/24  0635 04/23/24  0659    138 135   K 3.7 3.9 4.4    102 99   CO2 30 34* 31   BUN 20 20 20   CREATININE 1.3* 1.0 0.8   GLUCOSE 95 93 83         Assessment:    Suhas Leon is a 89 y.o. male with     History of nonvisualization of gallbladder by HIDA scan s/p cholecystostomy tube    Plan:    Imaging reports and labs reviewed. LFTs normal, pt without abdominal pain. Reduced drainage from cholecystostomy tube may represent migration of the tube out of the gallbladder or blockage. Overall clinical picture is inconsistent with acute cholecystitis, index workup may have represented a chronically nonfunctioning gallbladder. Given the prior concerns for acute cholecystitis and the overall clinical frailty of the patient, would recommend IR interrogation of the cholecystostomy tube and replacement if required, continue with original plan for outpatient follow-up for eventual cholecystectomy once pulmonary issues are resolved. No indications for surgical intervention at this time.   Recommend NPO now for possible IR procedure today, otherwise can continue 
history of nonsustained V. tach  Cardiology will continue to follow      The note was completed using EMR. Every effort was made to ensure accuracy; however, inadvertent computerized transcription errors may be present.  Disclaimer: This note was dictated by speech recognition. Minor errors in  transcription may be present

## 2024-04-24 NOTE — ADT AUTH CERT
225.5 (H) 11/21/2023      No results found for: \"LABURIC\", \"URICACID\"        Lab Results   Component Value Date     IRON 65 12/16/2023     TIBC 129 (L) 12/16/2023      No results found for: \"SPEP\", \"UPEP\"  No results found for: \"PSA\", \"CEA\", \"\", \"PS4353\", \"\"     Input/Output:     Intake/Output Summary (Last 24 hours) at 4/24/2024 1251  Last data filed at 4/24/2024 0910      Gross per 24 hour   Intake 513.81 ml   Output 900 ml   Net -386.19 ml            Microbiology:  No results for input(s): \"SPECDESC\", \"SPECIAL\", \"CULTURE\", \"STATUS\", \"ORG\", \"CDIFFTOXPCR\", \"CAMPYLOBPCR\", \"SALMONELLAPC\", \"SHIGAPCR\", \"SHIGELLAPCR\", \"MPNEUG\", \"MPNEUM\", \"LACTOQL\" in the last 72 hours.        Pathology:     Radiology reports:  XR CHEST PORTABLE   Final Result   Increased density involving the lower lung zones with blunting of the   costophrenic angles right greater than left. Findings may represent a   combination of pleural effusion and atelectasis.           CT CHEST WO CONTRAST   Final Result   1. Large right and moderate left pleural effusions and lower lobe atelectatic   changes.   2. Mild ascites.   3. Ectatic ascending thoracic aorta measuring up to 4 cm.   4. Coronary artery calcification.   5. Postthoracotomy changes.   6. No evidence of mediastinal lymphadenopathy.   7. No focal nodules or masses.           CT HEAD WO CONTRAST   Final Result   No acute intracranial abnormality. Paranasal sinus disease. Left mastoid   effusion.           XR CHEST PORTABLE   Final Result   Small bilateral pleural effusions and bibasilar airspace disease.  This could   represent a combination of atelectasis and/or pneumonia                 Echocardiogram:   No results found for this or any previous visit.        Cardiac Catheterization:   No results found for this or any previous visit.        ASSESSMENT AND PLAN      Assessment:     //Bilateral pleural effusion  //Possible aspiration pneumonia  //Hypoxia in the setting of pleural

## 2024-04-25 ENCOUNTER — APPOINTMENT (OUTPATIENT)
Age: 89
DRG: 177 | End: 2024-04-25
Attending: FAMILY MEDICINE
Payer: COMMERCIAL

## 2024-04-25 ENCOUNTER — APPOINTMENT (OUTPATIENT)
Dept: GENERAL RADIOLOGY | Age: 89
DRG: 177 | End: 2024-04-25
Payer: COMMERCIAL

## 2024-04-25 LAB
ANION GAP SERPL CALCULATED.3IONS-SCNC: 4 MMOL/L (ref 9–17)
BASOPHILS # BLD: 0.1 K/UL (ref 0–0.2)
BASOPHILS NFR BLD: 1 % (ref 0–2)
BUN SERPL-MCNC: 19 MG/DL (ref 8–23)
CALCIUM SERPL-MCNC: 8.3 MG/DL (ref 8.6–10.4)
CHLORIDE SERPL-SCNC: 99 MMOL/L (ref 98–107)
CO2 SERPL-SCNC: 35 MMOL/L (ref 20–31)
CREAT SERPL-MCNC: 0.9 MG/DL (ref 0.7–1.2)
EOSINOPHIL # BLD: 0.1 K/UL (ref 0–0.4)
EOSINOPHILS RELATIVE PERCENT: 1 % (ref 1–4)
ERYTHROCYTE [DISTWIDTH] IN BLOOD BY AUTOMATED COUNT: 16.1 % (ref 12.5–15.4)
GFR SERPL CREATININE-BSD FRML MDRD: 77 ML/MIN/1.73M2
GLUCOSE SERPL-MCNC: 100 MG/DL (ref 70–99)
HCT VFR BLD AUTO: 32.1 % (ref 41–53)
HGB BLD-MCNC: 10.9 G/DL (ref 13.5–17.5)
LYMPHOCYTES NFR BLD: 1.8 K/UL (ref 1–4.8)
LYMPHOCYTES RELATIVE PERCENT: 18 % (ref 24–44)
MCH RBC QN AUTO: 32.4 PG (ref 26–34)
MCHC RBC AUTO-ENTMCNC: 34 G/DL (ref 31–37)
MCV RBC AUTO: 95.5 FL (ref 80–100)
MONOCYTES NFR BLD: 0.9 K/UL (ref 0.1–1.2)
MONOCYTES NFR BLD: 9 % (ref 2–11)
NEUTROPHILS NFR BLD: 71 % (ref 36–66)
NEUTS SEG NFR BLD: 7.3 K/UL (ref 1.8–7.7)
PLATELET # BLD AUTO: 316 K/UL (ref 140–450)
PMV BLD AUTO: 8.9 FL (ref 6–12)
POTASSIUM SERPL-SCNC: 3.8 MMOL/L (ref 3.7–5.3)
RBC # BLD AUTO: 3.37 M/UL (ref 4.5–5.9)
SODIUM SERPL-SCNC: 138 MMOL/L (ref 135–144)
WBC OTHER # BLD: 10.1 K/UL (ref 3.5–11)

## 2024-04-25 PROCEDURE — 99232 SBSQ HOSP IP/OBS MODERATE 35: CPT | Performed by: INTERNAL MEDICINE

## 2024-04-25 PROCEDURE — 6370000000 HC RX 637 (ALT 250 FOR IP)

## 2024-04-25 PROCEDURE — 97116 GAIT TRAINING THERAPY: CPT

## 2024-04-25 PROCEDURE — 85025 COMPLETE CBC W/AUTO DIFF WBC: CPT

## 2024-04-25 PROCEDURE — 1210000000 HC MED SURG R&B

## 2024-04-25 PROCEDURE — 6360000002 HC RX W HCPCS

## 2024-04-25 PROCEDURE — 6370000000 HC RX 637 (ALT 250 FOR IP): Performed by: PHYSICIAN ASSISTANT

## 2024-04-25 PROCEDURE — 99213 OFFICE O/P EST LOW 20 MIN: CPT

## 2024-04-25 PROCEDURE — 76499 UNLISTED DX RADIOGRAPHIC PX: CPT

## 2024-04-25 PROCEDURE — 99232 SBSQ HOSP IP/OBS MODERATE 35: CPT | Performed by: FAMILY MEDICINE

## 2024-04-25 PROCEDURE — 2580000003 HC RX 258

## 2024-04-25 PROCEDURE — BF121ZZ FLUOROSCOPY OF GALLBLADDER USING LOW OSMOLAR CONTRAST: ICD-10-PCS | Performed by: RADIOLOGY

## 2024-04-25 PROCEDURE — 80048 BASIC METABOLIC PNL TOTAL CA: CPT

## 2024-04-25 PROCEDURE — 6370000000 HC RX 637 (ALT 250 FOR IP): Performed by: STUDENT IN AN ORGANIZED HEALTH CARE EDUCATION/TRAINING PROGRAM

## 2024-04-25 PROCEDURE — 36415 COLL VENOUS BLD VENIPUNCTURE: CPT

## 2024-04-25 PROCEDURE — 97530 THERAPEUTIC ACTIVITIES: CPT

## 2024-04-25 PROCEDURE — 97110 THERAPEUTIC EXERCISES: CPT

## 2024-04-25 RX ORDER — POTASSIUM CHLORIDE 20 MEQ/1
40 TABLET, EXTENDED RELEASE ORAL PRN
Status: DISCONTINUED | OUTPATIENT
Start: 2024-04-25 | End: 2024-04-29 | Stop reason: HOSPADM

## 2024-04-25 RX ORDER — MAGNESIUM SULFATE IN WATER 40 MG/ML
2000 INJECTION, SOLUTION INTRAVENOUS PRN
Status: DISCONTINUED | OUTPATIENT
Start: 2024-04-25 | End: 2024-04-29 | Stop reason: HOSPADM

## 2024-04-25 RX ORDER — POTASSIUM CHLORIDE 7.45 MG/ML
10 INJECTION INTRAVENOUS PRN
Status: DISCONTINUED | OUTPATIENT
Start: 2024-04-25 | End: 2024-04-29 | Stop reason: HOSPADM

## 2024-04-25 RX ADMIN — SODIUM CHLORIDE, PRESERVATIVE FREE 10 ML: 5 INJECTION INTRAVENOUS at 02:17

## 2024-04-25 RX ADMIN — POTASSIUM CHLORIDE 40 MEQ: 1500 TABLET, EXTENDED RELEASE ORAL at 09:53

## 2024-04-25 RX ADMIN — EMPAGLIFLOZIN 10 MG: 10 TABLET, FILM COATED ORAL at 09:53

## 2024-04-25 RX ADMIN — FUROSEMIDE 20 MG: 10 INJECTION, SOLUTION INTRAMUSCULAR; INTRAVENOUS at 09:53

## 2024-04-25 RX ADMIN — Medication 3 MG: at 21:59

## 2024-04-25 RX ADMIN — AMIODARONE HYDROCHLORIDE 200 MG: 200 TABLET ORAL at 09:53

## 2024-04-25 RX ADMIN — METOPROLOL SUCCINATE 12.5 MG: 25 TABLET, EXTENDED RELEASE ORAL at 09:52

## 2024-04-25 RX ADMIN — SPIRONOLACTONE 25 MG: 25 TABLET, FILM COATED ORAL at 09:52

## 2024-04-25 RX ADMIN — FINASTERIDE 5 MG: 5 TABLET, FILM COATED ORAL at 09:53

## 2024-04-25 RX ADMIN — SODIUM CHLORIDE, PRESERVATIVE FREE 10 ML: 5 INJECTION INTRAVENOUS at 21:57

## 2024-04-25 RX ADMIN — FLUTICASONE PROPIONATE 1 SPRAY: 50 SPRAY, METERED NASAL at 09:57

## 2024-04-25 RX ADMIN — LISINOPRIL 2.5 MG: 5 TABLET ORAL at 09:52

## 2024-04-25 NOTE — BRIEF OP NOTE
Brief Postoperative Note    Suhas Leon  YOB: 1925  [id number]    Pre-operative Diagnosis: Cholecystostomy Catheter not draining    Post-operative Diagnosis: Same    Procedure: Cholecystostogram    Anesthesia: None    Surgeons/Assistants: Minor    Estimated Blood Loss: less than 50     Complications: None    Specimens: Was Not requested    Findings: Contrast instillation through the catheter confirmed appropriate location within the gallbladder.        Electronically signed by Maycol Gaxiola MD on 4/25/2024 at 11:36 AM

## 2024-04-25 NOTE — DISCHARGE INSTR - COC
Continuity of Care Form    Patient Name: Suhas Leon   :  1925  MRN:  [id number]    Admit date:  2024  Discharge date:  2024    Code Status Order: DNR-CCA   Advance Directives:     Admitting Physician:  Marcello Yeung MD  PCP: Marcello Elise MD    Discharging Nurse: Colin  Discharging Hospital Unit/Room#: 323/323-01  Discharging Unit Phone Number: [phone]    Emergency Contact:   Extended Emergency Contact Information  Primary Emergency Contact: Ashley Leon  Home Phone: [phone]  Mobile Phone: [phone]  Relation: Spouse    Past Surgical History:  Past Surgical History:   Procedure Laterality Date    CORONARY ARTERY BYPASS GRAFT      EYE SURGERY      Cataracts    KNEE SURGERY Bilateral     TONSILLECTOMY         Immunization History:   Immunization History   Administered Date(s) Administered    COVID-19, PFIZER GRAY top, DO NOT Dilute, (age 12 y+), IM, 30 mcg/0.3 mL 2022    COVID-19, PFIZER PURPLE top, DILUTE for use, (age 12 y+), 30mcg/0.3mL 2021, 10/10/2021    Influenza, FLUAD, (age 65 y+), Adjuvanted, 0.5mL 2023    Influenza, FLUARIX, FLULAVAL, FLUZONE (age 6 mo+) AND AFLURIA, (age 3 y+), PF, 0.5mL 10/26/2022    Influenza, FLUZONE (age 65 y+), High Dose, 0.7mL 10/05/2020, 10/08/2021    Influenza, High Dose (Fluzone 65 yrs and older) 10/06/2017, 10/01/2018, 10/07/2019    Pneumococcal, PCV-13, PREVNAR 13, (age 6w+), IM, 0.5mL 2016, 2019    Pneumococcal, PCV20, PREVNAR 20, (age 6w+), IM, 0.5mL 2024    TDaP, ADACEL (age 10y-64y), BOOSTRIX (age 10y+), IM, 0.5mL 2023    Zoster Live (Zostavax) 2015       Active Problems:  Patient Active Problem List   Diagnosis Code    Hyperlipidemia E78.5    Hypertensive heart and chronic kidney disease with heart failure (HCC) I13.0    Chronic combined systolic and diastolic congestive heart failure (HCC) I50.42    Pressure ulcer of sacral region, unspecified stage L89.159    OA

## 2024-04-26 ENCOUNTER — APPOINTMENT (OUTPATIENT)
Dept: GENERAL RADIOLOGY | Age: 89
DRG: 177 | End: 2024-04-26
Payer: COMMERCIAL

## 2024-04-26 PROBLEM — R39.14 BENIGN PROSTATIC HYPERPLASIA WITH INCOMPLETE BLADDER EMPTYING: Status: ACTIVE | Noted: 2021-10-08

## 2024-04-26 PROBLEM — N40.1 BENIGN PROSTATIC HYPERPLASIA WITH INCOMPLETE BLADDER EMPTYING: Status: ACTIVE | Noted: 2021-10-08

## 2024-04-26 PROBLEM — R64 CACHEXIA (HCC): Status: ACTIVE | Noted: 2024-04-21

## 2024-04-26 LAB
ANION GAP SERPL CALCULATED.3IONS-SCNC: 6 MMOL/L (ref 9–17)
BASOPHILS # BLD: 0.1 K/UL (ref 0–0.2)
BASOPHILS NFR BLD: 1 % (ref 0–2)
BUN SERPL-MCNC: 17 MG/DL (ref 8–23)
CALCIUM SERPL-MCNC: 8.1 MG/DL (ref 8.6–10.4)
CHLORIDE SERPL-SCNC: 101 MMOL/L (ref 98–107)
CO2 SERPL-SCNC: 31 MMOL/L (ref 20–31)
CREAT SERPL-MCNC: 0.8 MG/DL (ref 0.7–1.2)
EOSINOPHIL # BLD: 0.1 K/UL (ref 0–0.4)
EOSINOPHILS RELATIVE PERCENT: 1 % (ref 1–4)
ERYTHROCYTE [DISTWIDTH] IN BLOOD BY AUTOMATED COUNT: 16.3 % (ref 12.5–15.4)
GFR SERPL CREATININE-BSD FRML MDRD: 80 ML/MIN/1.73M2
GLUCOSE SERPL-MCNC: 85 MG/DL (ref 70–99)
HCT VFR BLD AUTO: 32.2 % (ref 41–53)
HGB BLD-MCNC: 10.8 G/DL (ref 13.5–17.5)
LYMPHOCYTES NFR BLD: 2 K/UL (ref 1–4.8)
LYMPHOCYTES RELATIVE PERCENT: 18 % (ref 24–44)
MCH RBC QN AUTO: 31.8 PG (ref 26–34)
MCHC RBC AUTO-ENTMCNC: 33.5 G/DL (ref 31–37)
MCV RBC AUTO: 95 FL (ref 80–100)
MONOCYTES NFR BLD: 1.1 K/UL (ref 0.1–1.2)
MONOCYTES NFR BLD: 10 % (ref 2–11)
NEUTROPHILS NFR BLD: 70 % (ref 36–66)
NEUTS SEG NFR BLD: 7.7 K/UL (ref 1.8–7.7)
PLATELET # BLD AUTO: 316 K/UL (ref 140–450)
PMV BLD AUTO: 9.4 FL (ref 6–12)
POTASSIUM SERPL-SCNC: 4.3 MMOL/L (ref 3.7–5.3)
RBC # BLD AUTO: 3.39 M/UL (ref 4.5–5.9)
SODIUM SERPL-SCNC: 138 MMOL/L (ref 135–144)
WBC OTHER # BLD: 10.8 K/UL (ref 3.5–11)

## 2024-04-26 PROCEDURE — 99232 SBSQ HOSP IP/OBS MODERATE 35: CPT | Performed by: FAMILY MEDICINE

## 2024-04-26 PROCEDURE — 6360000002 HC RX W HCPCS

## 2024-04-26 PROCEDURE — 97535 SELF CARE MNGMENT TRAINING: CPT

## 2024-04-26 PROCEDURE — 85025 COMPLETE CBC W/AUTO DIFF WBC: CPT

## 2024-04-26 PROCEDURE — 1210000000 HC MED SURG R&B

## 2024-04-26 PROCEDURE — 71045 X-RAY EXAM CHEST 1 VIEW: CPT

## 2024-04-26 PROCEDURE — 36415 COLL VENOUS BLD VENIPUNCTURE: CPT

## 2024-04-26 PROCEDURE — 6370000000 HC RX 637 (ALT 250 FOR IP)

## 2024-04-26 PROCEDURE — 6370000000 HC RX 637 (ALT 250 FOR IP): Performed by: PHYSICIAN ASSISTANT

## 2024-04-26 PROCEDURE — 97116 GAIT TRAINING THERAPY: CPT

## 2024-04-26 PROCEDURE — 2580000003 HC RX 258

## 2024-04-26 PROCEDURE — 99232 SBSQ HOSP IP/OBS MODERATE 35: CPT | Performed by: INTERNAL MEDICINE

## 2024-04-26 PROCEDURE — 80048 BASIC METABOLIC PNL TOTAL CA: CPT

## 2024-04-26 PROCEDURE — 97110 THERAPEUTIC EXERCISES: CPT

## 2024-04-26 RX ADMIN — ONDANSETRON 4 MG: 2 INJECTION INTRAMUSCULAR; INTRAVENOUS at 23:12

## 2024-04-26 RX ADMIN — SPIRONOLACTONE 25 MG: 25 TABLET, FILM COATED ORAL at 08:30

## 2024-04-26 RX ADMIN — FLUTICASONE PROPIONATE 1 SPRAY: 50 SPRAY, METERED NASAL at 08:30

## 2024-04-26 RX ADMIN — METOPROLOL SUCCINATE 12.5 MG: 25 TABLET, EXTENDED RELEASE ORAL at 08:30

## 2024-04-26 RX ADMIN — FINASTERIDE 5 MG: 5 TABLET, FILM COATED ORAL at 08:30

## 2024-04-26 RX ADMIN — Medication 3 MG: at 19:48

## 2024-04-26 RX ADMIN — LISINOPRIL 2.5 MG: 5 TABLET ORAL at 08:30

## 2024-04-26 RX ADMIN — AMIODARONE HYDROCHLORIDE 200 MG: 200 TABLET ORAL at 08:30

## 2024-04-26 RX ADMIN — SODIUM CHLORIDE, PRESERVATIVE FREE 10 ML: 5 INJECTION INTRAVENOUS at 08:29

## 2024-04-26 RX ADMIN — SODIUM CHLORIDE, PRESERVATIVE FREE 10 ML: 5 INJECTION INTRAVENOUS at 19:48

## 2024-04-26 RX ADMIN — FUROSEMIDE 20 MG: 10 INJECTION, SOLUTION INTRAMUSCULAR; INTRAVENOUS at 08:30

## 2024-04-26 RX ADMIN — EMPAGLIFLOZIN 10 MG: 10 TABLET, FILM COATED ORAL at 08:30

## 2024-04-26 NOTE — DISCHARGE INSTRUCTIONS
Date of admission: 4/19/2024  Date of discharge: 04/26/24    Your care was provided by the attending and resident physicians of the [facility]. The primary encounter diagnosis was Pneumonia due to infectious organism, unspecified laterality, unspecified part of lung. A diagnosis of Generalized weakness was also pertinent to this visit.    FOLLOW-UP  - Follow up with your primary care physician Marcello Elise in 2 days.  - Follow up with your surgery in 2  weeks. To discuss timing for cholecystectomy (gallbladder removal)     MEDICATIONS  -  your medications from the pharmacy and take as directed.  - Continue taking your other home medications as directed.    ADDITIONAL INSTRUCTIONS  - Please return immediately to the [facility] Emergency Department if condition worsens, fever with increased difficulty breathing and chest congestion, you have trouble breathing, chest pain, feel like you are going to pass out, inability to urinate, or worsening sacral pressure ulcer that looks infected.

## 2024-04-27 LAB — GLUCOSE BLD-MCNC: 85 MG/DL (ref 75–110)

## 2024-04-27 PROCEDURE — 1210000000 HC MED SURG R&B

## 2024-04-27 PROCEDURE — 99232 SBSQ HOSP IP/OBS MODERATE 35: CPT | Performed by: FAMILY MEDICINE

## 2024-04-27 PROCEDURE — 97116 GAIT TRAINING THERAPY: CPT

## 2024-04-27 PROCEDURE — 6370000000 HC RX 637 (ALT 250 FOR IP)

## 2024-04-27 PROCEDURE — 99233 SBSQ HOSP IP/OBS HIGH 50: CPT | Performed by: INTERNAL MEDICINE

## 2024-04-27 PROCEDURE — 97530 THERAPEUTIC ACTIVITIES: CPT

## 2024-04-27 PROCEDURE — 6370000000 HC RX 637 (ALT 250 FOR IP): Performed by: PHYSICIAN ASSISTANT

## 2024-04-27 PROCEDURE — 6360000002 HC RX W HCPCS

## 2024-04-27 PROCEDURE — 82947 ASSAY GLUCOSE BLOOD QUANT: CPT

## 2024-04-27 PROCEDURE — 2580000003 HC RX 258

## 2024-04-27 RX ADMIN — EMPAGLIFLOZIN 10 MG: 10 TABLET, FILM COATED ORAL at 09:04

## 2024-04-27 RX ADMIN — SODIUM CHLORIDE, PRESERVATIVE FREE 10 ML: 5 INJECTION INTRAVENOUS at 09:00

## 2024-04-27 RX ADMIN — METOPROLOL SUCCINATE 12.5 MG: 25 TABLET, EXTENDED RELEASE ORAL at 09:04

## 2024-04-27 RX ADMIN — FINASTERIDE 5 MG: 5 TABLET, FILM COATED ORAL at 09:04

## 2024-04-27 RX ADMIN — AMIODARONE HYDROCHLORIDE 200 MG: 200 TABLET ORAL at 09:04

## 2024-04-27 RX ADMIN — Medication 3 MG: at 21:04

## 2024-04-27 RX ADMIN — SPIRONOLACTONE 25 MG: 25 TABLET, FILM COATED ORAL at 09:04

## 2024-04-27 RX ADMIN — FLUTICASONE PROPIONATE 1 SPRAY: 50 SPRAY, METERED NASAL at 09:00

## 2024-04-27 RX ADMIN — FUROSEMIDE 20 MG: 10 INJECTION, SOLUTION INTRAMUSCULAR; INTRAVENOUS at 09:00

## 2024-04-27 RX ADMIN — SODIUM CHLORIDE, PRESERVATIVE FREE 10 ML: 5 INJECTION INTRAVENOUS at 21:04

## 2024-04-27 RX ADMIN — LISINOPRIL 2.5 MG: 5 TABLET ORAL at 09:04

## 2024-04-27 ASSESSMENT — PAIN SCALES - GENERAL
PAINLEVEL_OUTOF10: 0

## 2024-04-28 PROCEDURE — 1210000000 HC MED SURG R&B

## 2024-04-28 PROCEDURE — 6370000000 HC RX 637 (ALT 250 FOR IP)

## 2024-04-28 PROCEDURE — 2580000003 HC RX 258

## 2024-04-28 PROCEDURE — 99232 SBSQ HOSP IP/OBS MODERATE 35: CPT | Performed by: FAMILY MEDICINE

## 2024-04-28 PROCEDURE — 92526 ORAL FUNCTION THERAPY: CPT

## 2024-04-28 PROCEDURE — 99233 SBSQ HOSP IP/OBS HIGH 50: CPT | Performed by: INTERNAL MEDICINE

## 2024-04-28 PROCEDURE — 6360000002 HC RX W HCPCS

## 2024-04-28 PROCEDURE — 6370000000 HC RX 637 (ALT 250 FOR IP): Performed by: PHYSICIAN ASSISTANT

## 2024-04-28 RX ADMIN — Medication 3 MG: at 21:00

## 2024-04-28 RX ADMIN — FINASTERIDE 5 MG: 5 TABLET, FILM COATED ORAL at 11:02

## 2024-04-28 RX ADMIN — METOPROLOL SUCCINATE 12.5 MG: 25 TABLET, EXTENDED RELEASE ORAL at 11:02

## 2024-04-28 RX ADMIN — SODIUM CHLORIDE, PRESERVATIVE FREE 10 ML: 5 INJECTION INTRAVENOUS at 11:03

## 2024-04-28 RX ADMIN — FLUTICASONE PROPIONATE 1 SPRAY: 50 SPRAY, METERED NASAL at 11:03

## 2024-04-28 RX ADMIN — AMIODARONE HYDROCHLORIDE 200 MG: 200 TABLET ORAL at 11:02

## 2024-04-28 RX ADMIN — SPIRONOLACTONE 25 MG: 25 TABLET, FILM COATED ORAL at 11:02

## 2024-04-28 RX ADMIN — LISINOPRIL 2.5 MG: 5 TABLET ORAL at 11:02

## 2024-04-28 RX ADMIN — SODIUM CHLORIDE, PRESERVATIVE FREE 10 ML: 5 INJECTION INTRAVENOUS at 21:00

## 2024-04-28 RX ADMIN — EMPAGLIFLOZIN 10 MG: 10 TABLET, FILM COATED ORAL at 11:02

## 2024-04-28 RX ADMIN — FUROSEMIDE 20 MG: 10 INJECTION, SOLUTION INTRAMUSCULAR; INTRAVENOUS at 11:02

## 2024-04-28 ASSESSMENT — PAIN SCALES - GENERAL
PAINLEVEL_OUTOF10: 0

## 2024-04-29 ENCOUNTER — TELEPHONE (OUTPATIENT)
Age: 89
End: 2024-04-29

## 2024-04-29 VITALS
DIASTOLIC BLOOD PRESSURE: 56 MMHG | WEIGHT: 118.61 LBS | RESPIRATION RATE: 16 BRPM | OXYGEN SATURATION: 94 % | SYSTOLIC BLOOD PRESSURE: 111 MMHG | HEART RATE: 53 BPM | BODY MASS INDEX: 16.98 KG/M2 | TEMPERATURE: 98.4 F | HEIGHT: 70 IN

## 2024-04-29 PROBLEM — I50.32 CHRONIC HEART FAILURE WITH PRESERVED EJECTION FRACTION (HCC): Status: ACTIVE | Noted: 2024-04-29

## 2024-04-29 PROCEDURE — 6370000000 HC RX 637 (ALT 250 FOR IP): Performed by: PHYSICIAN ASSISTANT

## 2024-04-29 PROCEDURE — 2580000003 HC RX 258

## 2024-04-29 PROCEDURE — 99238 HOSP IP/OBS DSCHRG MGMT 30/<: CPT | Performed by: FAMILY MEDICINE

## 2024-04-29 PROCEDURE — 6370000000 HC RX 637 (ALT 250 FOR IP)

## 2024-04-29 PROCEDURE — 99232 SBSQ HOSP IP/OBS MODERATE 35: CPT | Performed by: INTERNAL MEDICINE

## 2024-04-29 PROCEDURE — 97535 SELF CARE MNGMENT TRAINING: CPT

## 2024-04-29 PROCEDURE — 6360000002 HC RX W HCPCS

## 2024-04-29 PROCEDURE — 97110 THERAPEUTIC EXERCISES: CPT

## 2024-04-29 RX ORDER — METOPROLOL SUCCINATE 25 MG/1
12.5 TABLET, EXTENDED RELEASE ORAL DAILY
Qty: 30 TABLET | Refills: 3 | OUTPATIENT
Start: 2024-04-29

## 2024-04-29 RX ORDER — METOPROLOL SUCCINATE 25 MG/1
12.5 TABLET, EXTENDED RELEASE ORAL NIGHTLY
Qty: 15 TABLET | Refills: 0 | Status: SHIPPED | OUTPATIENT
Start: 2024-04-29 | End: 2024-05-29

## 2024-04-29 RX ORDER — ECHINACEA PURPUREA EXTRACT 125 MG
1 TABLET ORAL PRN
Qty: 1 EACH | Refills: 0 | Status: SHIPPED | OUTPATIENT
Start: 2024-04-29 | End: 2024-05-29

## 2024-04-29 RX ORDER — METOPROLOL SUCCINATE 25 MG/1
12.5 TABLET, EXTENDED RELEASE ORAL DAILY
Qty: 15 TABLET | Refills: 0 | Status: SHIPPED | OUTPATIENT
Start: 2024-04-29 | End: 2024-04-29

## 2024-04-29 RX ORDER — LISINOPRIL 2.5 MG/1
2.5 TABLET ORAL DAILY
Qty: 30 TABLET | Refills: 0 | Status: SHIPPED | OUTPATIENT
Start: 2024-04-30

## 2024-04-29 RX ORDER — LISINOPRIL 2.5 MG/1
2.5 TABLET ORAL DAILY
Qty: 30 TABLET | Refills: 3 | OUTPATIENT
Start: 2024-04-29

## 2024-04-29 RX ORDER — FLUTICASONE PROPIONATE 50 MCG
1 SPRAY, SUSPENSION (ML) NASAL DAILY
Qty: 16 G | Refills: 0 | Status: SHIPPED | OUTPATIENT
Start: 2024-04-30

## 2024-04-29 RX ORDER — SPIRONOLACTONE 25 MG/1
25 TABLET ORAL DAILY
Qty: 30 TABLET | Refills: 0 | Status: SHIPPED | OUTPATIENT
Start: 2024-04-30 | End: 2024-05-30

## 2024-04-29 RX ADMIN — FLUTICASONE PROPIONATE 1 SPRAY: 50 SPRAY, METERED NASAL at 09:36

## 2024-04-29 RX ADMIN — SODIUM CHLORIDE, PRESERVATIVE FREE 10 ML: 5 INJECTION INTRAVENOUS at 09:36

## 2024-04-29 RX ADMIN — SPIRONOLACTONE 25 MG: 25 TABLET, FILM COATED ORAL at 09:35

## 2024-04-29 RX ADMIN — METOPROLOL SUCCINATE 12.5 MG: 25 TABLET, EXTENDED RELEASE ORAL at 09:36

## 2024-04-29 RX ADMIN — AMIODARONE HYDROCHLORIDE 200 MG: 200 TABLET ORAL at 09:35

## 2024-04-29 RX ADMIN — FUROSEMIDE 20 MG: 10 INJECTION, SOLUTION INTRAMUSCULAR; INTRAVENOUS at 09:36

## 2024-04-29 RX ADMIN — EMPAGLIFLOZIN 10 MG: 10 TABLET, FILM COATED ORAL at 09:35

## 2024-04-29 RX ADMIN — FINASTERIDE 5 MG: 5 TABLET, FILM COATED ORAL at 09:36

## 2024-04-29 RX ADMIN — LISINOPRIL 2.5 MG: 5 TABLET ORAL at 09:35

## 2024-04-29 ASSESSMENT — ENCOUNTER SYMPTOMS
COUGH: 1
EYES NEGATIVE: 1
GASTROINTESTINAL NEGATIVE: 1
ALLERGIC/IMMUNOLOGIC NEGATIVE: 1
SHORTNESS OF BREATH: 0

## 2024-04-29 NOTE — TELEPHONE ENCOUNTER
Transitional Care Management:    Pt was admitted on 4/19 and discharged on 4/29 for pneumonia.    Pharmacy Team: Please see discharge summary for further details to complete TCM call.    To PCP: Dr. Elise, your patient was admitted as above. Please see discharge summary for further details.    To : Please call patient and schedule for TCM office visit within 3 days.

## 2024-04-29 NOTE — PLAN OF CARE
Problem: Safety - Adult  Goal: Free from fall injury  4/20/2024 2327 by Delaney Vázquez RN  Outcome: Progressing  4/20/2024 1510 by Yolanda Vargas RN  Outcome: Progressing     Problem: Discharge Planning  Goal: Discharge to home or other facility with appropriate resources  4/20/2024 2327 by Delaney Vázquez RN  Outcome: Progressing  4/20/2024 1510 by Yolanda Vargas RN  Outcome: Progressing  Flowsheets (Taken 4/20/2024 0840)  Discharge to home or other facility with appropriate resources: Identify barriers to discharge with patient and caregiver     Problem: Skin/Tissue Integrity  Goal: Absence of new skin breakdown  Description: 1.  Monitor for areas of redness and/or skin breakdown  2.  Assess vascular access sites hourly  3.  Every 4-6 hours minimum:  Change oxygen saturation probe site  4.  Every 4-6 hours:  If on nasal continuous positive airway pressure, respiratory therapy assess nares and determine need for appliance change or resting period.  4/20/2024 2327 by Delaney Vázquez RN  Outcome: Progressing  4/20/2024 1510 by Yolanda Vargas RN  Outcome: Progressing     Problem: ABCDS Injury Assessment  Goal: Absence of physical injury  4/20/2024 2327 by Delaney Vázquez RN  Outcome: Progressing  4/20/2024 1510 by Yolanda Vargas RN  Outcome: Progressing     
  Problem: Safety - Adult  Goal: Free from fall injury  4/21/2024 1721 by Yolanda Vargas, RN  Outcome: Progressing  4/21/2024 1721 by Yolanda Vargas RN  Outcome: Progressing     Problem: Discharge Planning  Goal: Discharge to home or other facility with appropriate resources  Outcome: Progressing  Flowsheets (Taken 4/21/2024 0910)  Discharge to home or other facility with appropriate resources: Identify barriers to discharge with patient and caregiver     Problem: Skin/Tissue Integrity  Goal: Absence of new skin breakdown  Description: 1.  Monitor for areas of redness and/or skin breakdown  2.  Assess vascular access sites hourly  3.  Every 4-6 hours minimum:  Change oxygen saturation probe site  4.  Every 4-6 hours:  If on nasal continuous positive airway pressure, respiratory therapy assess nares and determine need for appliance change or resting period.  Outcome: Progressing     Problem: ABCDS Injury Assessment  Goal: Absence of physical injury  Outcome: Progressing     Problem: Chronic Conditions and Co-morbidities  Goal: Patient's chronic conditions and co-morbidity symptoms are monitored and maintained or improved  Outcome: Progressing  Flowsheets (Taken 4/21/2024 0910)  Care Plan - Patient's Chronic Conditions and Co-Morbidity Symptoms are Monitored and Maintained or Improved: Monitor and assess patient's chronic conditions and comorbid symptoms for stability, deterioration, or improvement     
  Problem: Safety - Adult  Goal: Free from fall injury  4/21/2024 2355 by Delaney Vázquez RN  Outcome: Progressing  4/21/2024 1721 by Yolanda Vargas RN  Outcome: Progressing     Problem: Discharge Planning  Goal: Discharge to home or other facility with appropriate resources  4/21/2024 2355 by Delaney Vázquez RN  Outcome: Progressing  4/21/2024 1721 by Yolanda Vargas RN  Outcome: Progressing  Flowsheets (Taken 4/21/2024 0910)  Discharge to home or other facility with appropriate resources: Identify barriers to discharge with patient and caregiver     Problem: Skin/Tissue Integrity  Goal: Absence of new skin breakdown  Description: 1.  Monitor for areas of redness and/or skin breakdown  2.  Assess vascular access sites hourly  3.  Every 4-6 hours minimum:  Change oxygen saturation probe site  4.  Every 4-6 hours:  If on nasal continuous positive airway pressure, respiratory therapy assess nares and determine need for appliance change or resting period.  4/21/2024 2355 by Delaney Vázquez RN  Outcome: Progressing  4/21/2024 1721 by Yolanda Vargas RN  Outcome: Progressing     Problem: ABCDS Injury Assessment  Goal: Absence of physical injury  4/21/2024 1721 by Yolanda Vargas RN  Outcome: Progressing     Problem: Chronic Conditions and Co-morbidities  Goal: Patient's chronic conditions and co-morbidity symptoms are monitored and maintained or improved  4/21/2024 2355 by Delaney Vázquez RN  Outcome: Progressing  4/21/2024 1721 by Yolanda Vargas RN  Outcome: Progressing  Flowsheets (Taken 4/21/2024 0910)  Care Plan - Patient's Chronic Conditions and Co-Morbidity Symptoms are Monitored and Maintained or Improved: Monitor and assess patient's chronic conditions and comorbid symptoms for stability, deterioration, or improvement     
  Problem: Safety - Adult  Goal: Free from fall injury  4/23/2024 0402 by Yajaira Carolina RN  Outcome: Progressing     Problem: Discharge Planning  Goal: Discharge to home or other facility with appropriate resources  4/23/2024 0402 by Yajaira Carolina RN  Outcome: Progressing     Problem: Skin/Tissue Integrity  Goal: Absence of new skin breakdown  Description: 1.  Monitor for areas of redness and/or skin breakdown  2.  Assess vascular access sites hourly  3.  Every 4-6 hours minimum:  Change oxygen saturation probe site  4.  Every 4-6 hours:  If on nasal continuous positive airway pressure, respiratory therapy assess nares and determine need for appliance change or resting period.  4/23/2024 0402 by Yajaira Carolina RN  Outcome: Progressing     Problem: ABCDS Injury Assessment  Goal: Absence of physical injury  4/23/2024 0402 by Yajaira Carolina RN  Outcome: Progressing     Problem: Chronic Conditions and Co-morbidities  Goal: Patient's chronic conditions and co-morbidity symptoms are monitored and maintained or improved  4/23/2024 0402 by Yajaira Carolina RN  Outcome: Progressing     Problem: Neurosensory - Adult  Goal: Achieves stable or improved neurological status  4/23/2024 0402 by Yajaira Carolina RN  Outcome: Progressing     Problem: Respiratory - Adult  Goal: Achieves optimal ventilation and oxygenation  4/23/2024 0402 by Yajaira Carolina RN  Outcome: Progressing     Problem: Cardiovascular - Adult  Goal: Maintains optimal cardiac output and hemodynamic stability  4/23/2024 0402 by Yajaira Carolina RN  Outcome: Progressing  Flowsheets (Taken 4/22/2024 2044)  Maintains optimal cardiac output and hemodynamic stability:   Monitor blood pressure and heart rate   Monitor urine output and notify Licensed Independent Practitioner for values outside of normal range   Assess for signs of decreased cardiac output     Problem: Skin/Tissue Integrity - Adult  Goal: Skin integrity remains intact  4/23/2024 0402 by 
  Problem: Safety - Adult  Goal: Free from fall injury  4/24/2024 0907 by Melissa Alves RN  Outcome: Progressing  4/24/2024 0557 by Yajaira Carolina RN  Outcome: Progressing     Problem: Discharge Planning  Goal: Discharge to home or other facility with appropriate resources  4/24/2024 0907 by Melissa Alves RN  Outcome: Progressing  4/24/2024 0557 by Yajaira Carolina RN  Outcome: Progressing     Problem: Skin/Tissue Integrity  Goal: Absence of new skin breakdown  Description: 1.  Monitor for areas of redness and/or skin breakdown  2.  Assess vascular access sites hourly  3.  Every 4-6 hours minimum:  Change oxygen saturation probe site  4.  Every 4-6 hours:  If on nasal continuous positive airway pressure, respiratory therapy assess nares and determine need for appliance change or resting period.  4/24/2024 0907 by Melissa Alves RN  Outcome: Progressing  4/24/2024 0557 by Yajaira Carolina RN  Outcome: Progressing     Problem: ABCDS Injury Assessment  Goal: Absence of physical injury  4/24/2024 0907 by Melissa Alves RN  Outcome: Progressing  4/24/2024 0557 by Yajaira Carolina RN  Outcome: Progressing     Problem: Chronic Conditions and Co-morbidities  Goal: Patient's chronic conditions and co-morbidity symptoms are monitored and maintained or improved  4/24/2024 0907 by Melissa Alves RN  Outcome: Progressing  4/24/2024 0557 by Yajaira Carolina RN  Outcome: Progressing     Problem: Neurosensory - Adult  Goal: Achieves stable or improved neurological status  4/24/2024 0907 by Melissa Alves RN  Outcome: Progressing  4/24/2024 0557 by Yajaira Carolina RN  Outcome: Progressing     Problem: Respiratory - Adult  Goal: Achieves optimal ventilation and oxygenation  4/24/2024 0907 by Melissa Alves RN  Outcome: Progressing  4/24/2024 0557 by Yajaira Carolina RN  Outcome: Progressing     Problem: Cardiovascular - Adult  Goal: Maintains optimal cardiac output and hemodynamic stability  4/24/2024 
  Problem: Safety - Adult  Goal: Free from fall injury  Outcome: Progressing     Problem: Discharge Planning  Goal: Discharge to home or other facility with appropriate resources  Outcome: Progressing     Problem: Skin/Tissue Integrity  Goal: Absence of new skin breakdown  Description: 1.  Monitor for areas of redness and/or skin breakdown  2.  Assess vascular access sites hourly  3.  Every 4-6 hours minimum:  Change oxygen saturation probe site  4.  Every 4-6 hours:  If on nasal continuous positive airway pressure, respiratory therapy assess nares and determine need for appliance change or resting period.  Outcome: Progressing     Problem: ABCDS Injury Assessment  Goal: Absence of physical injury  Outcome: Progressing     Problem: Chronic Conditions and Co-morbidities  Goal: Patient's chronic conditions and co-morbidity symptoms are monitored and maintained or improved  Outcome: Progressing     Problem: Neurosensory - Adult  Goal: Achieves stable or improved neurological status  Outcome: Progressing     Problem: Respiratory - Adult  Goal: Achieves optimal ventilation and oxygenation  Outcome: Progressing     Problem: Cardiovascular - Adult  Goal: Maintains optimal cardiac output and hemodynamic stability  Outcome: Progressing     Problem: Skin/Tissue Integrity - Adult  Goal: Skin integrity remains intact  Outcome: Progressing     Problem: Musculoskeletal - Adult  Goal: Return mobility to safest level of function  Outcome: Progressing     Problem: Gastrointestinal - Adult  Goal: Minimal or absence of nausea and vomiting  Outcome: Progressing     Problem: Genitourinary - Adult  Goal: Absence of urinary retention  Outcome: Progressing     Problem: Infection - Adult  Goal: Absence of infection at discharge  Outcome: Progressing     Problem: Metabolic/Fluid and Electrolytes - Adult  Goal: Electrolytes maintained within normal limits  Outcome: Progressing     Problem: Hematologic - Adult  Goal: Maintains hematologic 
  Problem: Safety - Adult  Goal: Free from fall injury  Outcome: Progressing     Problem: Discharge Planning  Goal: Discharge to home or other facility with appropriate resources  Outcome: Progressing     Problem: Skin/Tissue Integrity  Goal: Absence of new skin breakdown  Description: 1.  Monitor for areas of redness and/or skin breakdown  2.  Assess vascular access sites hourly  3.  Every 4-6 hours minimum:  Change oxygen saturation probe site  4.  Every 4-6 hours:  If on nasal continuous positive airway pressure, respiratory therapy assess nares and determine need for appliance change or resting period.  Outcome: Progressing     Problem: ABCDS Injury Assessment  Goal: Absence of physical injury  Outcome: Progressing     Problem: Chronic Conditions and Co-morbidities  Goal: Patient's chronic conditions and co-morbidity symptoms are monitored and maintained or improved  Outcome: Progressing     Problem: Neurosensory - Adult  Goal: Achieves stable or improved neurological status  Outcome: Progressing  Flowsheets (Taken 4/22/2024 0857)  Achieves stable or improved neurological status: Assess for and report changes in neurological status     Problem: Respiratory - Adult  Goal: Achieves optimal ventilation and oxygenation  Outcome: Progressing  Flowsheets (Taken 4/22/2024 0857)  Achieves optimal ventilation and oxygenation: Assess for changes in respiratory status     Problem: Cardiovascular - Adult  Goal: Maintains optimal cardiac output and hemodynamic stability  Outcome: Progressing  Flowsheets (Taken 4/22/2024 0857)  Maintains optimal cardiac output and hemodynamic stability: Monitor blood pressure and heart rate     Problem: Skin/Tissue Integrity - Adult  Goal: Skin integrity remains intact  Outcome: Progressing  Flowsheets (Taken 4/22/2024 0857)  Skin Integrity Remains Intact: Monitor for areas of redness and/or skin breakdown     Problem: Musculoskeletal - Adult  Goal: Return mobility to safest level of 
  Problem: Safety - Adult  Goal: Free from fall injury  Outcome: Progressing     Problem: Discharge Planning  Goal: Discharge to home or other facility with appropriate resources  Outcome: Progressing  Flowsheets  Taken 4/28/2024 1700  Discharge to home or other facility with appropriate resources:   Arrange for needed discharge resources and transportation as appropriate   Identify barriers to discharge with patient and caregiver   Identify discharge learning needs (meds, wound care, etc)  Taken 4/28/2024 1330  Discharge to home or other facility with appropriate resources:   Identify barriers to discharge with patient and caregiver   Arrange for needed discharge resources and transportation as appropriate   Identify discharge learning needs (meds, wound care, etc)  Taken 4/28/2024 0830  Discharge to home or other facility with appropriate resources:   Identify barriers to discharge with patient and caregiver   Arrange for needed discharge resources and transportation as appropriate   Identify discharge learning needs (meds, wound care, etc)     Problem: Skin/Tissue Integrity  Goal: Absence of new skin breakdown  Description: 1.  Monitor for areas of redness and/or skin breakdown  2.  Assess vascular access sites hourly  3.  Every 4-6 hours minimum:  Change oxygen saturation probe site  4.  Every 4-6 hours:  If on nasal continuous positive airway pressure, respiratory therapy assess nares and determine need for appliance change or resting period.  Outcome: Progressing     Problem: ABCDS Injury Assessment  Goal: Absence of physical injury  Outcome: Progressing     Problem: Chronic Conditions and Co-morbidities  Goal: Patient's chronic conditions and co-morbidity symptoms are monitored and maintained or improved  Outcome: Progressing  Flowsheets  Taken 4/28/2024 1700  Care Plan - Patient's Chronic Conditions and Co-Morbidity Symptoms are Monitored and Maintained or Improved: Monitor and assess patient's chronic 
  Problem: Safety - Adult  Goal: Free from fall injury  Outcome: Progressing     Problem: Discharge Planning  Goal: Discharge to home or other facility with appropriate resources  Outcome: Progressing  Flowsheets (Taken 4/19/2024 1900 by Poonam Lemons, RN)  Discharge to home or other facility with appropriate resources:   Identify barriers to discharge with patient and caregiver   Identify discharge learning needs (meds, wound care, etc)   Arrange for needed discharge resources and transportation as appropriate   Arrange for interpreters to assist at discharge as needed   Refer to discharge planning if patient needs post-hospital services based on physician order or complex needs related to functional status, cognitive ability or social support system     Problem: Skin/Tissue Integrity  Goal: Absence of new skin breakdown  Description: 1.  Monitor for areas of redness and/or skin breakdown  2.  Assess vascular access sites hourly  3.  Every 4-6 hours minimum:  Change oxygen saturation probe site  4.  Every 4-6 hours:  If on nasal continuous positive airway pressure, respiratory therapy assess nares and determine need for appliance change or resting period.  Outcome: Progressing     
  Problem: Safety - Adult  Goal: Free from fall injury  Outcome: Progressing     Problem: Discharge Planning  Goal: Discharge to home or other facility with appropriate resources  Outcome: Progressing  Flowsheets (Taken 4/20/2024 0840)  Discharge to home or other facility with appropriate resources: Identify barriers to discharge with patient and caregiver     Problem: Skin/Tissue Integrity  Goal: Absence of new skin breakdown  Description: 1.  Monitor for areas of redness and/or skin breakdown  2.  Assess vascular access sites hourly  3.  Every 4-6 hours minimum:  Change oxygen saturation probe site  4.  Every 4-6 hours:  If on nasal continuous positive airway pressure, respiratory therapy assess nares and determine need for appliance change or resting period.  Outcome: Progressing     Problem: ABCDS Injury Assessment  Goal: Absence of physical injury  Outcome: Progressing     
  Problem: Safety - Adult  Goal: Free from fall injury  Outcome: Progressing     Problem: Discharge Planning  Goal: Discharge to home or other facility with appropriate resources  Outcome: Progressing  Flowsheets (Taken 4/27/2024 2000)  Discharge to home or other facility with appropriate resources: Arrange for needed discharge resources and transportation as appropriate     Problem: Chronic Conditions and Co-morbidities  Goal: Patient's chronic conditions and co-morbidity symptoms are monitored and maintained or improved  Recent Flowsheet Documentation  Taken 4/27/2024 2000 by Josiah Zarate RN  Care Plan - Patient's Chronic Conditions and Co-Morbidity Symptoms are Monitored and Maintained or Improved: Monitor and assess patient's chronic conditions and comorbid symptoms for stability, deterioration, or improvement     Problem: Neurosensory - Adult  Goal: Achieves stable or improved neurological status  Recent Flowsheet Documentation  Taken 4/27/2024 2000 by Josiah Zarate RN  Achieves stable or improved neurological status:   Assess for and report changes in neurological status   Initiate measures to prevent increased intracranial pressure     Problem: Respiratory - Adult  Goal: Achieves optimal ventilation and oxygenation  Recent Flowsheet Documentation  Taken 4/27/2024 2000 by Josiah Zarate RN  Achieves optimal ventilation and oxygenation: Assess for changes in respiratory status     Problem: Cardiovascular - Adult  Goal: Maintains optimal cardiac output and hemodynamic stability  Recent Flowsheet Documentation  Taken 4/27/2024 2000 by Josiah Zarate RN  Maintains optimal cardiac output and hemodynamic stability: Monitor blood pressure and heart rate     Problem: Skin/Tissue Integrity - Adult  Goal: Skin integrity remains intact  Recent Flowsheet Documentation  Taken 4/27/2024 2000 by Josiah Zarate RN  Skin Integrity Remains Intact: Monitor for areas of redness and/or skin 
Assess for changes in respiratory status   Assess for changes in mentation and behavior     Problem: Cardiovascular - Adult  Goal: Maintains optimal cardiac output and hemodynamic stability  Outcome: Progressing     Problem: Skin/Tissue Integrity - Adult  Goal: Skin integrity remains intact  Outcome: Progressing  Flowsheets (Taken 4/24/2024 2000)  Skin Integrity Remains Intact:   Monitor for areas of redness and/or skin breakdown   Assess vascular access sites hourly     Problem: Musculoskeletal - Adult  Goal: Return mobility to safest level of function  Outcome: Progressing     Problem: Gastrointestinal - Adult  Goal: Minimal or absence of nausea and vomiting  Outcome: Progressing     Problem: Infection - Adult  Goal: Absence of infection at discharge  Outcome: Progressing  Flowsheets  Taken 4/25/2024 0000  Absence of infection at discharge: Assess and monitor for signs and symptoms of infection  Taken 4/24/2024 2000  Absence of infection at discharge: Assess and monitor for signs and symptoms of infection     Problem: Nutrition Deficit:  Goal: Optimize nutritional status  Outcome: Progressing     
Progressing  Flowsheets (Taken 4/26/2024 2000)  Skin Integrity Remains Intact:   Monitor for areas of redness and/or skin breakdown   Assess vascular access sites hourly     Problem: Gastrointestinal - Adult  Goal: Minimal or absence of nausea and vomiting  Outcome: Progressing  Flowsheets (Taken 4/26/2024 2000)  Minimal or absence of nausea and vomiting:   Administer IV fluids as ordered to ensure adequate hydration   Maintain NPO status until nausea and vomiting are resolved     
intracranial pressure     Problem: Respiratory - Adult  Goal: Achieves optimal ventilation and oxygenation  Outcome: Progressing     Problem: Cardiovascular - Adult  Goal: Maintains optimal cardiac output and hemodynamic stability  Outcome: Progressing  Flowsheets (Taken 4/25/2024 2000)  Maintains optimal cardiac output and hemodynamic stability:   Monitor blood pressure and heart rate   Assess for signs of decreased cardiac output     Problem: Skin/Tissue Integrity - Adult  Goal: Skin integrity remains intact  Outcome: Progressing  Flowsheets (Taken 4/26/2024 0000)  Skin Integrity Remains Intact:   Monitor for areas of redness and/or skin breakdown   Assess vascular access sites hourly     Problem: Musculoskeletal - Adult  Goal: Return mobility to safest level of function  Outcome: Progressing  Flowsheets (Taken 4/25/2024 2000)  Return Mobility to Safest Level of Function: Assess patient stability and activity tolerance for standing, transferring and ambulating with or without assistive devices     Problem: Gastrointestinal - Adult  Goal: Minimal or absence of nausea and vomiting  Outcome: Progressing     Problem: Genitourinary - Adult  Goal: Absence of urinary retention  Outcome: Progressing  Flowsheets (Taken 4/25/2024 2000)  Absence of urinary retention: Monitor intake/output and perform bladder scan as needed     Problem: Infection - Adult  Goal: Absence of infection at discharge  Outcome: Progressing  Flowsheets  Taken 4/26/2024 0000  Absence of infection at discharge:   Assess and monitor for signs and symptoms of infection   Monitor lab/diagnostic results  Taken 4/25/2024 2000  Absence of infection at discharge: Assess and monitor for signs and symptoms of infection     Problem: Metabolic/Fluid and Electrolytes - Adult  Goal: Electrolytes maintained within normal limits  Outcome: Progressing     Problem: Hematologic - Adult  Goal: Maintains hematologic stability  Outcome: Progressing  Flowsheets (Taken 
2000)  Absence of infection at discharge: Assess and monitor for signs and symptoms of infection     Problem: Metabolic/Fluid and Electrolytes - Adult  Goal: Electrolytes maintained within normal limits  4/29/2024 0123 by Pamela Llamas RN  Outcome: Progressing  Flowsheets (Taken 4/28/2024 2000)  Electrolytes maintained within normal limits: Monitor labs and assess patient for signs and symptoms of electrolyte imbalances     Problem: Hematologic - Adult  Goal: Maintains hematologic stability  4/29/2024 0123 by Pamela Llamas RN  Outcome: Progressing  Flowsheets (Taken 4/28/2024 2000)  Maintains hematologic stability: Assess for signs and symptoms of bleeding or hemorrhage     Problem: Spiritual Care  Goal: Pt/Family able to move forward in process of forgiving self, others, and/or higher power  Description: INTERVENTIONS:  1. Assist patient/family to overcome blocks to healing by use of spiritual practices (prayer, meditation, guided imagery, reiki, breath work, etc).  2. De-myth guilt and help patient/family identify possible irrational spiritual/cultural beliefs and values.  3. Explore possibilities of making amends & reconciliation with self, others, and/or a greater power.  4. Guide patient/family in identifying painful feelings of guilt.  5. Help patient/famiy explore and identify spiritual beliefs, cultural understandings or values that may help or hinder letting go of issue.  6. Help patient/family explore feelings of anger, bitterness, resentment.  7. Help patient/family identify and examine the situation in which these feelings are experienced.  8. Help patient/family identify destructive displacement of feelings onto other individuals.  9. Invite use of sacraments/rituals/ceremonies as appropriate (e.g. - confession, anointing, smudging).  10. Refer patient/family to formal counseling and/or to [place] community for further support work.  4/29/2024 0123 by Pamela Llamas RN  Outcome: 
Electrolytes - Adult  Goal: Electrolytes maintained within normal limits  4/29/2024 1036 by Melissa Alves RN  Outcome: Completed  Flowsheets (Taken 4/29/2024 0945)  Electrolytes maintained within normal limits:   Monitor labs and assess patient for signs and symptoms of electrolyte imbalances   Administer electrolyte replacement as ordered  4/29/2024 0123 by Pamela Llamas RN  Outcome: Progressing  Flowsheets (Taken 4/28/2024 2000)  Electrolytes maintained within normal limits: Monitor labs and assess patient for signs and symptoms of electrolyte imbalances     Problem: Hematologic - Adult  Goal: Maintains hematologic stability  4/29/2024 1036 by Melissa Alves RN  Outcome: Completed  Flowsheets (Taken 4/29/2024 0945)  Maintains hematologic stability: Assess for signs and symptoms of bleeding or hemorrhage  4/29/2024 0123 by Pamela Llamas RN  Outcome: Progressing  Flowsheets (Taken 4/28/2024 2000)  Maintains hematologic stability: Assess for signs and symptoms of bleeding or hemorrhage     Problem: Spiritual Care  Goal: Pt/Family able to move forward in process of forgiving self, others, and/or higher power  Description: INTERVENTIONS:  1. Assist patient/family to overcome blocks to healing by use of spiritual practices (prayer, meditation, guided imagery, reiki, breath work, etc).  2. De-myth guilt and help patient/family identify possible irrational spiritual/cultural beliefs and values.  3. Explore possibilities of making amends & reconciliation with self, others, and/or a greater power.  4. Guide patient/family in identifying painful feelings of guilt.  5. Help patient/famiy explore and identify spiritual beliefs, cultural understandings or values that may help or hinder letting go of issue.  6. Help patient/family explore feelings of anger, bitterness, resentment.  7. Help patient/family identify and examine the situation in which these feelings are experienced.  8. Help patient/family 
patient/family identify destructive displacement of feelings onto other individuals.  9. Invite use of sacraments/rituals/ceremonies as appropriate (e.g. - confession, anointing, smudging).  10. Refer patient/family to formal counseling and/or to [place] [facility] for further support work.  4/25/2024 1446 by Melissa Alves, RN  Outcome: Progressing  4/25/2024 0547 by Yesenia Middleton, RN  Outcome: Progressing     Problem: Nutrition Deficit:  Goal: Optimize nutritional status  4/25/2024 1446 by Melissa Alves, RN  Outcome: Progressing  4/25/2024 0547 by Yesenia Middleton, RN  Outcome: Progressing

## 2024-04-29 NOTE — PROGRESS NOTES
PULMONARY & CRITICAL CARE MEDICINE PROGRESS  NOTE     Patient:  Suhas Leon  MRN: [id number]  Admit date: 2024  Primary Care Physician: Marcello Elise MD  Consulting Physician: Marcello Elise MD  CODE Status: DNR-CCA  LOS: 6    SUBJECTIVE     I personally interviewed/examined the patient, reviewed interval history and interpreted all available radiographic, laboratory data at the time of service.      Chief Compliant/Reason for Initial Consult:   Pleural effusions    Brief Hospital Course:  The patient is a 89 y.o. male  here for evaluation of weakness, fatigue, concern for pneumonia. He was recently discharged from [facility] and being brought from his assisted living facility for weakness and fatigue. CT imaging done in the ED is concerning for moderate right and small left pleural effusion. Pulmonary being consulted for further recommendation. CT head without acute process. Echo with LVEF 20-25%, cardiology consulted. Infectious work up obtained and patient started on antibiotics. Patient does not report any chest pain, shortness of breath. No fever or chills. He does report coughing and choking with food.     Interval History:  24  Patient is currently on O2 Flow Rate (L/min)  Av L/min  Min: 2 L/min  Max: 2 L/min  Hemodynamically stable no hypotension was reported  Afebrile in last 24-hour Tmax is 98.6  He does not complain of shortness of breath at rest mild cough.  Patient went for IR this morning and had cholecystostomy tube was apparently in appropriate location within the gallbladder no intervention needed.      Review of Systems:  Review of Systems   Constitutional:  Positive for fatigue. Negative for activity change.   HENT: Negative.     Eyes: Negative.    Respiratory:  Positive for cough. Negative for shortness of breath.    Cardiovascular: Negative.    Gastrointestinal: Negative.    Endocrine: Negative.    Genitourinary: Negative.  
                                                                 PULMONARY & CRITICAL CARE MEDICINE PROGRESS  NOTE     Patient:  Suhas Leon  MRN: [id number]  Admit date: 2024  Primary Care Physician: Marcello Elise MD  Consulting Physician: Marcello Yeung MD  CODE Status: DNR-CCA  LOS: 3    SUBJECTIVE     I personally interviewed/examined the patient, reviewed interval history and interpreted all available radiographic, laboratory data at the time of service.      Chief Compliant/Reason for Initial Consult:   Pleural effusions    Brief Hospital Course:  The patient is a 89 y.o. male  here for evaluation of weakness, fatigue, concern for pneumonia. He was recently discharged from [facility] and being brought from his assisted living facility for weakness and fatigue. CT imaging done in the ED is concerning for moderate right and small left pleural effusion. Pulmonary being consulted for further recommendation. CT head without acute process. Echo with LVEF 20-25%, cardiology consulted. Infectious work up obtained and patient started on antibiotics. Patient does not report any chest pain, shortness of breath. No fever or chills. He does report coughing and choking with food.     Interval History:  24  Patient is currently on O2 Flow Rate (L/min)  Av L/min  Min: 2 L/min  Max: 2 L/min per nursing staff no acute events were reported overnight  T-max is 97.9 in last 24 hours he is on 2 L nasal cannula maintaining saturation between 95% and 99%.  He does not complain of shortness of breath at rest.  He currently denies difficulty swallowing coughing or choking when he eats.  When I saw him he just had finished eating.  Does not have any fever chest pain hemoptysis.  Of the chest reviewed showed moderate right pleural effusion and small left pleural effusion    Review of Systems:  Review of Systems   Constitutional:  Positive for activity change.   HENT: Negative.     Eyes: Negative.    Respiratory:  Positive for 
                                                                 PULMONARY & CRITICAL CARE MEDICINE PROGRESS  NOTE     Patient:  Suhas Leon  MRN: [id number]  Admit date: 2024  Primary Care Physician: Marcello Elise MD  Consulting Physician: Marcello Yeung MD  CODE Status: DNR-CCA  LOS: 4    SUBJECTIVE     I personally interviewed/examined the patient, reviewed interval history and interpreted all available radiographic, laboratory data at the time of service.      Chief Compliant/Reason for Initial Consult:   Pleural effusions    Brief Hospital Course:  The patient is a 89 y.o. male  here for evaluation of weakness, fatigue, concern for pneumonia. He was recently discharged from [facility] and being brought from his assisted living facility for weakness and fatigue. CT imaging done in the ED is concerning for moderate right and small left pleural effusion. Pulmonary being consulted for further recommendation. CT head without acute process. Echo with LVEF 20-25%, cardiology consulted. Infectious work up obtained and patient started on antibiotics. Patient does not report any chest pain, shortness of breath. No fever or chills. He does report coughing and choking with food.     Interval History:  24  Patient is currently on O2 Flow Rate (L/min)  Av L/min  Min: 2 L/min  Max: 2 L/min  Hemodynamically stable  Afebrile  No overnight issues reported    Review of Systems:  Review of Systems   Constitutional:  Positive for activity change.   HENT: Negative.     Eyes: Negative.    Respiratory:  Positive for cough. Negative for shortness of breath.    Cardiovascular: Negative.    Gastrointestinal: Negative.    Endocrine: Negative.    Genitourinary: Negative.    Musculoskeletal:  Positive for arthralgias.   Allergic/Immunologic: Negative.    Neurological: Negative.    Hematological: Negative.    Psychiatric/Behavioral: Negative.         OBJECTIVE     VITAL SIGNS:   LAST-  BP (!) 107/54   Pulse 63   Temp 98.4 °F (36.9 
                                                                 PULMONARY & CRITICAL CARE MEDICINE PROGRESS  NOTE     Patient:  Suhas Leon  MRN: [id number]  Admit date: 4/19/2024  Primary Care Physician: Mracello Elise MD  Consulting Physician: Tiana Morton MD  CODE Status: DNR-CCA  LOS: 7    SUBJECTIVE     I personally interviewed/examined the patient, reviewed interval history and interpreted all available radiographic, laboratory data at the time of service.      Chief Compliant/Reason for Initial Consult:   Pleural effusions    Brief Hospital Course:  The patient is a 89 y.o. male  here for evaluation of weakness, fatigue, concern for pneumonia. He was recently discharged from [facility] and being brought from his assisted living facility for weakness and fatigue. CT imaging done in the ED is concerning for moderate right and small left pleural effusion. Pulmonary being consulted for further recommendation. CT head without acute process. Echo with LVEF 20-25%, cardiology consulted. Infectious work up obtained and patient started on antibiotics. Patient does not report any chest pain, shortness of breath. No fever or chills. He does report coughing and choking with food.     Interval History:  04/26/24  Patient remains on nasal cannula  Hemodynamically stable  Afebrile  Denies any new complaints    Review of Systems:  Review of Systems   Constitutional:  Positive for fatigue. Negative for activity change.   HENT: Negative.     Eyes: Negative.    Respiratory:  Positive for cough. Negative for shortness of breath.    Cardiovascular: Negative.    Gastrointestinal: Negative.    Endocrine: Negative.    Genitourinary: Negative.    Musculoskeletal:  Positive for arthralgias.   Allergic/Immunologic: Negative.    Neurological: Negative.    Hematological: Negative.    Psychiatric/Behavioral: Negative.         OBJECTIVE     VITAL SIGNS:   LAST-  BP (!) 127/53   Pulse 69   Temp 97.3 °F (36.3 °C) (Oral)   Resp 14   Ht 1.778 
                                                                 PULMONARY & CRITICAL CARE MEDICINE PROGRESS  NOTE     Patient:  Suhas Leon  MRN: [id number]  Admit date: 4/19/2024  Primary Care Physician: Marcello Elise MD  Consulting Physician: Tiana Morton MD  CODE Status: DNR-CCA  LOS: 10    SUBJECTIVE     I personally interviewed/examined the patient, reviewed interval history and interpreted all available radiographic, laboratory data at the time of service.      Chief Compliant/Reason for Initial Consult:   Pleural effusions    Brief Hospital Course:  The patient is a 89 y.o. male  here for evaluation of weakness, fatigue, concern for pneumonia. He was recently discharged from [facility] and being brought from his assisted living facility for weakness and fatigue. CT imaging done in the ED is concerning for moderate right and small left pleural effusion. Pulmonary being consulted for further recommendation. CT head without acute process. Echo with LVEF 20-25%, cardiology consulted. Infectious work up obtained and patient started on antibiotics. Patient does not report any chest pain, shortness of breath. No fever or chills. He does report coughing and choking with food.     Interval History:  04/29/24  Patient remains on nasal cannula  Hemodynamically stable  Afebrile  Denies any new complaints    Review of Systems:  Review of Systems   Constitutional:  Positive for fatigue. Negative for activity change.   HENT: Negative.     Eyes: Negative.    Respiratory:  Positive for cough. Negative for shortness of breath.    Cardiovascular: Negative.    Gastrointestinal: Negative.    Endocrine: Negative.    Genitourinary: Negative.    Musculoskeletal:  Positive for arthralgias.   Allergic/Immunologic: Negative.    Neurological: Negative.    Hematological: Negative.    Psychiatric/Behavioral: Negative.         OBJECTIVE     VITAL SIGNS:   LAST-  BP (!) 111/56   Pulse 53   Temp 98.4 °F (36.9 °C) (Oral)   Resp 16   Ht 1.778 
          [facility] Radha & Jose Guadalupe Garcia M.D., M.H.A.  Migel Shi M.D., M.B.A.  UZIEL Toscano P.A.C.    [facility]  [address]    Cardio-Oncology Service  [address], [address]  [place] [address]   [facility] [place] [address]  Phone: [phone]   [place] [address]   Phone: [phone]  Fax: [phone]    [phone]   Fax: [phone]          DAILY HOSPITAL PROGRESS NOTE     # DAYS IN HOSPITAL: 2  ADMIT DATE: 4/19/2024        SUBJECTIVE:     Cardiology continuing to follow.  Patient remains in hospital for bilateral pleural effusion, being treated for pneumonia.    Spoke with patient today in his room.  His wife was also present for today's conversation.    Patient reports he is feeling well today.  He denies chest pain, chest tightness or pressure.  No palpitations.  Denies lightheaded/dizziness.  Reports thirst improved today.  Continues to report pressure-like pain from sitting in bed/chair.  Reports his breathing has improved today.  Remains on nasal cannula oxygen therapy.    He denies questions today from a cardiac standpoint.    Patient's medications were adjusted yesterday for his chronic systolic congestive heart failure.  He is tolerating his medications well currently.  Blood pressure remains stable, no significant hypotension.  No arrhythmias.  Remains in sinus bradycardia.  Denies significant weakness.    Patient's previous history was reviewed.  [facility], last seen roughly 1 year ago.  He does have known history of coronary artery disease status post CABG x 4 in 1993.  Known history of nonsustained V. tach on amiodarone 200 mg daily.  History of hypertension, hyperlipidemia.  Known history of COPD.  He did have an echocardiogram in November 2023 with an ejection fraction of 20 to 25%, no 
    [facility]  Inpatient Service     Progress Note  2024    1:13 PM    Name:   Suhas Leon  MRN:     [id number]     Acct:      [id number]   Room:   323/323-01   Day:  9  Admit Date:  2024  2:17 PM    PCP:   Marcello Elise MD  Code Status:  DNR-CCA    Subjective:     Overnight events: No acute events overnight    Pt was examined at bedside where he was resting comfortably. No concerns or complaints today; slept well overnight, reports no acute pains. Denies chest pain, SOB, nausea/vomiting, or lower extremity pain. Pt just endorses boredom; discussed that we were awaiting precert from a SNF our residency covers so we could follow his care.     Medications:     Allergies:  No Known Allergies    Current Meds:   Scheduled Meds:    lisinopril  2.5 mg Oral Daily    metoprolol succinate  12.5 mg Oral Daily    empagliflozin  10 mg Oral Daily    spironolactone  25 mg Oral Daily    [Held by provider] furosemide  20 mg Oral BID    furosemide  20 mg IntraVENous Daily    sodium chloride flush  5-40 mL IntraVENous 2 times per day    melatonin  3 mg Oral Nightly    fluticasone  1 spray Each Nostril Daily    amiodarone  200 mg Oral Daily    finasteride  5 mg Oral Daily     Continuous Infusions:    sodium chloride       PRN Meds: potassium chloride **OR** potassium alternative oral replacement **OR** potassium chloride, magnesium sulfate, albuterol, sodium chloride flush, sodium chloride, ondansetron **OR** ondansetron, polyethylene glycol, acetaminophen **OR** acetaminophen, sodium chloride    ROS:   ROS is negative except for points noted above.    Data:     Vitals:  BP (!) 96/44   Pulse 57   Temp 97.7 °F (36.5 °C) (Oral)   Resp 16   Ht 1.778 m (5' 10\")   Wt 59.8 kg (131 lb 13.4 oz)   SpO2 96%   BMI 18.92 kg/m²   Temp (24hrs), Av.9 °F (36.6 °C), Min:97.6 °F (36.4 °C), Max:98.1 °F (36.7 °C)    Recent Labs     24  0838   POCGLU 85       I/O 
    [facility] Residency  Inpatient Service     Progress Note  2024    9:08 AM    Name:   Suhas Leon  MRN:     [id number]     Acct:      [id number]   Room:   323/323-01   Day:  5  Admit Date:  2024  2:17 PM    PCP:   Marcello Elise MD  Code Status:  DNR-CCA    Subjective:     Overnight events: No acute events overnight    Pt was examined at bedside where he was sitting up in his bed in no acute distress eating breakfast.    His mood is good overall and he is just a little worried about the details of his health and planning for his cholecystostomy tube.  His last bowel movement was 2 days ago and he is feeling a bit gassy.  He denies having any trouble breathing or fever but his upper lungs are a bit congested.  No other complaints or concerns today.    Medications:     Allergies:  No Known Allergies    Current Meds:   Scheduled Meds:    lisinopril  2.5 mg Oral Daily    metoprolol succinate  12.5 mg Oral Daily    empagliflozin  10 mg Oral Daily    spironolactone  25 mg Oral Daily    [Held by provider] furosemide  20 mg Oral BID    furosemide  20 mg IntraVENous Daily    sodium chloride flush  5-40 mL IntraVENous 2 times per day    melatonin  3 mg Oral Nightly    fluticasone  1 spray Each Nostril Daily    amiodarone  200 mg Oral Daily    finasteride  5 mg Oral Daily     Continuous Infusions:    sodium chloride       PRN Meds: albuterol, sodium chloride flush, sodium chloride, ondansetron **OR** ondansetron, polyethylene glycol, acetaminophen **OR** acetaminophen, potassium chloride **OR** potassium alternative oral replacement **OR** potassium chloride, magnesium sulfate, sodium chloride    ROS:   ROS is negative except for points noted above.    Data:     Vitals:  BP (!) 141/56   Pulse 52   Temp 97.7 °F (36.5 °C) (Oral)   Resp 16   Ht 1.778 m (5' 10\")   Wt 54.9 kg (121 lb)   SpO2 99%   BMI 17.36 kg/m²   Temp (24hrs), Av.1 °F (36.7 °C), Min:97.7 
    [facility] Residency  Inpatient Service     Progress Note  4/22/2024    10:44 AM    Name:   Suhas Leon  MRN:     [id number]     Acct:      [id number]   Room:   323/323-01   Day:  3  Admit Date:  4/19/2024  2:17 PM    PCP:   Marcello Elise MD  Code Status:  DNR-CCA    Subjective:     Overnight events: No acute events overnight    Pt was examined at bedside where he was laying in his bed in no acute distress.  He seemed a little anxious wondering how many days he had left and not wanting to overburden his wife.  He states that he is doing well today overall.  Getting on and off sleep. Not a fan of the hospital food and decreased appetite. He has not had a bowel movement or passed gas in 1-2 days and feels like his stomach is a bit bloated with gas pain. He was having some nausea in the morning that resolved. Patient denies any chest pain, current shortness of breath, headache, abdominal pain, nausea, vomiting, diarrhea.      Medications:     Allergies:  No Known Allergies    Current Meds:   Scheduled Meds:    lisinopril  2.5 mg Oral Daily    metoprolol succinate  12.5 mg Oral Daily    empagliflozin  10 mg Oral Daily    spironolactone  25 mg Oral Daily    [Held by provider] furosemide  20 mg Oral BID    furosemide  20 mg IntraVENous Daily    sodium chloride flush  5-40 mL IntraVENous 2 times per day    melatonin  3 mg Oral Nightly    cefTRIAXone (ROCEPHIN) IV  1,000 mg IntraVENous Q24H    fluticasone  1 spray Each Nostril Daily    amiodarone  200 mg Oral Daily    finasteride  5 mg Oral Daily     Continuous Infusions:    sodium chloride       PRN Meds: albuterol, sodium chloride flush, sodium chloride, ondansetron **OR** ondansetron, polyethylene glycol, acetaminophen **OR** acetaminophen, potassium chloride **OR** potassium alternative oral replacement **OR** potassium chloride, magnesium sulfate, sodium chloride    ROS:   ROS is negative except for points noted 
    [facility] Residency  Inpatient Service     Progress Note  4/21/2024    1:47 PM    Name:   Suhas Leon  MRN:     [id number]     Acct:      [id number]   Room:   323/323-01   Day:  2  Admit Date:  4/19/2024  2:17 PM    PCP:   Marcello Elise MD  Code Status:  DNR-CCA    Subjective:     Overnight events: No acute events overnight    Pt was examined at bedside where he is sitting up in no acute distress having eaten 80% of his breakfast.  He states that he is doing well today.  Feeling better than yesterday. Had some passing shortness of breath at night that went away when the temperature of the room was lowered. Patient denies any chest pain, current shortness of breath, headache, abdominal pain, nausea, vomiting, diarrhea.      Medications:     Allergies:  No Known Allergies    Current Meds:   Scheduled Meds:    metoprolol succinate  12.5 mg Oral Daily    empagliflozin  10 mg Oral Daily    spironolactone  25 mg Oral Daily    [Held by provider] furosemide  20 mg Oral BID    furosemide  20 mg IntraVENous Daily    sodium chloride flush  5-40 mL IntraVENous 2 times per day    melatonin  3 mg Oral Nightly    cefTRIAXone (ROCEPHIN) IV  1,000 mg IntraVENous Q24H    azithromycin  500 mg IntraVENous Q24H    fluticasone  1 spray Each Nostril Daily    amiodarone  200 mg Oral Daily    finasteride  5 mg Oral Daily     Continuous Infusions:    sodium chloride       PRN Meds: albuterol, sodium chloride flush, sodium chloride, ondansetron **OR** ondansetron, polyethylene glycol, acetaminophen **OR** acetaminophen, potassium chloride **OR** potassium alternative oral replacement **OR** potassium chloride, magnesium sulfate, sodium chloride    ROS:   ROS is negative except for points noted above.    Data:     Vitals:  BP (!) 134/59   Pulse 59   Temp 97.7 °F (36.5 °C) (Oral)   Resp 22   Ht 1.778 m (5' 10\")   Wt 51.1 kg (112 lb 10.5 oz)   SpO2 99%   BMI 16.16 kg/m²   Temp 
    [facility] Residency  Inpatient Service     Progress Note  4/20/2024    6:29 PM    Name:   Suhas Leon  MRN:     [id number]     Acct:      [id number]   Room:   323/323-01   Day:  1  Admit Date:  4/19/2024  2:17 PM    PCP:   Marclelo Elise MD  Code Status:  DNR-CCA    Subjective:     Overnight events: No acute events overnight    Pt was examined at bedside and he states that he is doing well today.  Feeling better in the afternoon than he did this morning.  Patient is alert and oriented x 3.  Patient states he is eating more today than he has in the past few days.  He requests boost protein shakes and would like a chocolate ice cream because his mouth is dry.  Patient denies any chest pain, shortness of breath, headache, abdominal pain, nausea, vomiting, diarrhea.      Medications:     Allergies:  No Known Allergies    Current Meds:   Scheduled Meds:    metoprolol succinate  12.5 mg Oral Daily    empagliflozin  10 mg Oral Daily    spironolactone  25 mg Oral Daily    [Held by provider] furosemide  20 mg Oral BID    [START ON 4/21/2024] furosemide  20 mg IntraVENous Daily    sodium chloride flush  5-40 mL IntraVENous 2 times per day    melatonin  3 mg Oral Nightly    cefTRIAXone (ROCEPHIN) IV  1,000 mg IntraVENous Q24H    azithromycin  500 mg IntraVENous Q24H    fluticasone  1 spray Each Nostril Daily    amiodarone  200 mg Oral Daily    finasteride  5 mg Oral Daily     Continuous Infusions:    sodium chloride       PRN Meds: sodium chloride flush, sodium chloride, ondansetron **OR** ondansetron, polyethylene glycol, acetaminophen **OR** acetaminophen, potassium chloride **OR** potassium alternative oral replacement **OR** potassium chloride, magnesium sulfate, sodium chloride    ROS:   ROS is negative except for points noted above.    Data:     Vitals:  BP (!) 120/58   Pulse 58   Temp 97.6 °F (36.4 °C) (Oral)   Resp 18   Ht 1.778 m (5' 10\")   Wt 47.6 kg (105 
    [facility] Residency  Inpatient Service     Progress Note  2024    8:25 AM    Name:   Suhas Leon  MRN:     [id number]     Acct:      [id number]   Room:   323/323-01   Day:  10  Admit Date:  2024  2:17 PM    PCP:   Marcello Elise MD  Code Status:  DNR-CCA    Subjective:     Overnight events: No acute events overnight    Patient was examined at bedside this morning where he is resting comfortably no acute distress.  He has no concerns or complaints today.  He had a bowel movement yesterday.  His appetite is still decreased and he does not like the hospital food very much which occasionally gives him nausea.  He is able to consume Ensure shakes.  His back is still sore but stable.  He has bandages on his sacrum and his middle thorax.     Medications:     Allergies:  No Known Allergies    Current Meds:   Scheduled Meds:    lisinopril  2.5 mg Oral Daily    metoprolol succinate  12.5 mg Oral Daily    empagliflozin  10 mg Oral Daily    spironolactone  25 mg Oral Daily    [Held by provider] furosemide  20 mg Oral BID    furosemide  20 mg IntraVENous Daily    sodium chloride flush  5-40 mL IntraVENous 2 times per day    melatonin  3 mg Oral Nightly    fluticasone  1 spray Each Nostril Daily    amiodarone  200 mg Oral Daily    finasteride  5 mg Oral Daily     Continuous Infusions:    sodium chloride       PRN Meds: potassium chloride **OR** potassium alternative oral replacement **OR** potassium chloride, magnesium sulfate, albuterol, sodium chloride flush, sodium chloride, ondansetron **OR** ondansetron, polyethylene glycol, acetaminophen **OR** acetaminophen, sodium chloride    ROS:   ROS is negative except for points noted above.    Data:     Vitals:  BP (!) 146/55   Pulse 58   Temp 98.1 °F (36.7 °C) (Oral)   Resp 16   Ht 1.778 m (5' 10\")   Wt 53.8 kg (118 lb 9.7 oz)   SpO2 95%   BMI 17.02 kg/m²   Temp (24hrs), Av.9 °F (36.6 °C), Min:97.6 °F (36.4 
    [facility] Residency  Inpatient Service     Progress Note  2024    1:14 PM    Name:   Suhas Leon  MRN:     [id number]     Acct:      [id number]   Room:   323/323-01   Day:  8  Admit Date:  2024  2:17 PM    PCP:   Marcello Elise MD  Code Status:  DNR-CCA    Subjective:     Overnight events: No acute events overnight    Pt was examined at bedside where he was resting comfortably.   He is ready to be discharged to a SNF pending precertification process, and is agreeable to this.  He wants us to keep his wife informed with regard to his discharge planning.  Patient is stable with no new symptoms, complaints, or concerns.       Medications:     Allergies:  No Known Allergies    Current Meds:   Scheduled Meds:    lisinopril  2.5 mg Oral Daily    metoprolol succinate  12.5 mg Oral Daily    empagliflozin  10 mg Oral Daily    spironolactone  25 mg Oral Daily    [Held by provider] furosemide  20 mg Oral BID    furosemide  20 mg IntraVENous Daily    sodium chloride flush  5-40 mL IntraVENous 2 times per day    melatonin  3 mg Oral Nightly    fluticasone  1 spray Each Nostril Daily    amiodarone  200 mg Oral Daily    finasteride  5 mg Oral Daily     Continuous Infusions:    sodium chloride       PRN Meds: potassium chloride **OR** potassium alternative oral replacement **OR** potassium chloride, magnesium sulfate, albuterol, sodium chloride flush, sodium chloride, ondansetron **OR** ondansetron, polyethylene glycol, acetaminophen **OR** acetaminophen, sodium chloride    ROS:   ROS is negative except for points noted above.    Data:     Vitals:  BP (!) 103/53   Pulse 55   Temp 97.2 °F (36.2 °C) (Oral)   Resp 16   Ht 1.778 m (5' 10\")   Wt 59.8 kg (131 lb 13.4 oz)   SpO2 94%   BMI 18.92 kg/m²   Temp (24hrs), Av.5 °F (36.4 °C), Min:97.2 °F (36.2 °C), Max:97.9 °F (36.6 °C)    Recent Labs     24  0838   POCGLU 85       I/O (24Hr):    Intake/Output 
    [facility]  Inpatient Service     Progress Note  4/25/2024    9:41 AM    Name:   Suhas Leon  MRN:     [id number]     Acct:      [id number]   Room:   323/323-01   Day:  6  Admit Date:  4/19/2024  2:17 PM    PCP:   Marcello Elise MD  Code Status:  DNR-CCA    Subjective:     Overnight events: No acute events overnight    Pt was examined at bedside where he was sitting up in his bed in no acute distress eating breakfast. He is feeling well overall. He had a coughing fit this morning which causes him to vomit a little bit. He felt fine afterwards. He was also happy he had a bowel movement this morning. His mood is good overall and he is just waiting for IR to come today to check on his cholecystostomy tube. He wants us to keep his wife informed so that she does not have to worry and dose not have to spend a lot on a cab to keep visiting him here. Denies: fever, lightheadedness or dizziness. No other complaints of concerns.     Medications:     Allergies:  No Known Allergies    Current Meds:   Scheduled Meds:    lisinopril  2.5 mg Oral Daily    metoprolol succinate  12.5 mg Oral Daily    empagliflozin  10 mg Oral Daily    spironolactone  25 mg Oral Daily    [Held by provider] furosemide  20 mg Oral BID    furosemide  20 mg IntraVENous Daily    sodium chloride flush  5-40 mL IntraVENous 2 times per day    melatonin  3 mg Oral Nightly    fluticasone  1 spray Each Nostril Daily    amiodarone  200 mg Oral Daily    finasteride  5 mg Oral Daily     Continuous Infusions:    sodium chloride       PRN Meds: potassium chloride **OR** potassium alternative oral replacement **OR** potassium chloride, magnesium sulfate, albuterol, sodium chloride flush, sodium chloride, ondansetron **OR** ondansetron, polyethylene glycol, acetaminophen **OR** acetaminophen, sodium chloride    ROS:   ROS is negative except for points noted above.    Data:     Vitals:  BP (!) 148/47   
Comprehensive Nutrition Assessment    Type and Reason for Visit:  Reassess    Nutrition Recommendations/Plan:   NPO   Advance diet when medically appropriate   Monitor labs as they become available   Monitor skin integrity/weights     Malnutrition Assessment:  Malnutrition Status:  Moderate malnutrition (04/22/24 1227)    Context:  Chronic Illness     Findings of the 6 clinical characteristics of malnutrition:  Energy Intake:  Mild decrease in energy intake (Comment)  Weight Loss:  Greater than 20% over 1 year     Body Fat Loss:  Mild body fat loss Orbital   Muscle Mass Loss:  Mild muscle mass loss Hand (interosseous)  Fluid Accumulation:  No significant fluid accumulation     Strength:  Not Performed    Nutrition Assessment:    89 y.o. male who presented to [facility] emergency department via ambulance from his assisted living facility for increased weakness, fatigue and shortness of breath. He had a recent visit to [facility] secondary to mechanical fall with multisystem trauma and issues with gallbladder. Noted to have a pneumothorax on that admission with 800 mL of pleural fluid removed. Discharged on 4/16/2024, sent back to his independent living facility. He is currently NPO. Await IR evaluation of the cholecystostomy tube. Will re add wound healing supplement when diet resumes, discharge planning is to [place], weight on 4/25 was 112#, weight on 4/21 was 112#, 12/20/23 as 111# had a 1# gain x 4 months.    Nutrition Related Findings:    BM 4/25 active sounds, labs/meds reviewed. Wound Type: Skin Tears, Stage III, Stage II (Medical back skin tear, Right tidial lateral stage 2, coccyx stage 3.)       Current Nutrition Intake & Therapies:    Average Meal Intake: NPO  Average Supplements Intake: NPO  Diet NPO    Anthropometric Measures:  Height: 177.8 cm (5' 10\")  Ideal Body Weight (IBW): 166 lbs (75 kg)    Admission Body Weight: 47.6 kg (105 lb)  Current Body Weight: 50.3 kg (111 lb), 66.9 % IBW. Weight 
IR procedure shows cholecystostomy tube in appropriate position. Plan to continue gallbladder drainage until outpatient follow up to discuss timing for cholecystectomy. No plans for surgical intervention at this time. Available as needed.      Electronically signed by Dario Mendenhall DO on 4/26/2024 at 7:06 AM    
Occupational Therapy  Facility/Department: [address]  Occupational Therapy Daily Treatment Note      Name: Suhas Leon  : 1925  MRN: [id number]  Date of Service: 2024    Discharge Recommendations:  Patient would benefit from continued therapy after discharge  OT Equipment Recommendations  Other: TBD       Patient Diagnosis(es): The primary encounter diagnosis was Pneumonia due to infectious organism, unspecified laterality, unspecified part of lung. A diagnosis of Generalized weakness was also pertinent to this visit.  Past Medical History:  has a past medical history of BPH (benign prostatic hyperplasia), Cataracts, bilateral, COPD (chronic obstructive pulmonary disease) (HCC), Former smoker, Hearing loss, History of intermittent claudication, Hyperlipidemia, Intestinal infection due to Clostridium difficile, OA (osteoarthritis) of knee, Pneumonia, Prediabetes, and Spinal stenosis.  Past Surgical History:  has a past surgical history that includes Coronary artery bypass graft (); Eye surgery (); knee surgery (Bilateral); Tonsillectomy (); and IR CHOLECYSTOSTOMY PERCUTANEOUS COMPLETE (2024).           Assessment   Performance deficits / Impairments: Decreased functional mobility ;Decreased balance;Decreased ADL status  Assessment: Patient demonstrated decreased ADLs, balance and functional mobility following hospitalization due to SOB, weakness, fatigue and failure to thrive. Patient would benefit from skilled OT services addressing above deficits while here at hospital and following discharge to maximize independence. Patient is currently unsafe to return to prior living arrangements.  Prognosis: Good  Activity Tolerance  Activity Tolerance: Patient limited by fatigue  Activity Tolerance Comments: pt able to complete 7-10 reps A/AROM All movements all planes to increase strength and tolerance for ADLs. Pt needed minimal Assist with shoulder flex  B UE to achieve greatest ROM possible 
Occupational Therapy  Facility/Department: [address]  Occupational Therapy Daily Treatment Note     Name: Suhas Leon  : 1925  MRN: [id number]  Date of Service: 2024    Discharge Recommendations:  Patient would benefit from continued therapy after discharge          Patient Diagnosis(es): The primary encounter diagnosis was Pneumonia due to infectious organism, unspecified laterality, unspecified part of lung. A diagnosis of Generalized weakness was also pertinent to this visit.  Past Medical History:  has a past medical history of BPH (benign prostatic hyperplasia), Cataracts, bilateral, COPD (chronic obstructive pulmonary disease) (HCC), Former smoker, Hearing loss, History of intermittent claudication, Hyperlipidemia, Intestinal infection due to Clostridium difficile, OA (osteoarthritis) of knee, Pneumonia, Prediabetes, and Spinal stenosis.  Past Surgical History:  has a past surgical history that includes Coronary artery bypass graft (); Eye surgery (); knee surgery (Bilateral); and Tonsillectomy ().           Assessment   Performance deficits / Impairments: Decreased functional mobility ;Decreased balance;Decreased ADL status  Assessment: Patient demonstrated decreased ADLs, balance and functional mobility following hospitalization due to SOB, weakness, fatigue and failure to thrive. Patient would benefit from skilled OT services addressing above deficits while here at hospital and following discharge to maximize independence. Patient is currently unsafe to return to prior living arrangements.  Prognosis: Good  REQUIRES OT FOLLOW-UP: Yes  Activity Tolerance  Activity Tolerance: Patient limited by fatigue  Activity Tolerance Comments: Pt tolerates standing tolerance of 1 minutes and then 70 secs with f(-) with RW, no dizziness        Plan   Occupational Therapy Plan  Times Per Week: 5-6x/wk  Current Treatment Recommendations: Balance training, Functional mobility training, Safety 
Occupational Therapy  Facility/Department: [address]  Occupational Therapy Initial Assessment    Name: Suhas Leon  : 1925  MRN: [id number]  Date of Service: 2024    Discharge Recommendations:  Patient would benefit from continued therapy after discharge  OT Equipment Recommendations  Other: TBD     Chief Complaint   Patient presents with    Fatigue     Pt arrives by EMS from home with CC of sick. Weakness and fatigue. SOB. Occasional C/P.       Patient Diagnosis(es): The primary encounter diagnosis was Pneumonia due to infectious organism, unspecified laterality, unspecified part of lung. A diagnosis of Generalized weakness was also pertinent to this visit.  Past Medical History:  has a past medical history of BPH (benign prostatic hyperplasia), Cataracts, bilateral, COPD (chronic obstructive pulmonary disease) (), Former smoker, Hearing loss, History of intermittent claudication, Hyperlipidemia, Intestinal infection due to Clostridium difficile, OA (osteoarthritis) of knee, Pneumonia, Prediabetes, and Spinal stenosis.  Past Surgical History:  has a past surgical history that includes Coronary artery bypass graft (); Eye surgery (); knee surgery (Bilateral); and Tonsillectomy ().           Assessment   Performance deficits / Impairments: Decreased functional mobility ;Decreased balance;Decreased ADL status  Assessment: Patient demonstrated decreased ADLs, balance and functional mobility following hospitalization due to SOB, weakness, fatigue and failure to thrive. Patient would benefit from skilled OT services addressing above deficits while here at hospital and following discharge to maximize independence. Patient is currently unsafe to return to prior living arrangements.  Prognosis: Good  Decision Making: Medium Complexity  REQUIRES OT FOLLOW-UP: Yes  Activity Tolerance  Activity Tolerance: Patient limited by fatigue  Activity Tolerance Comments: patient fearful of falling    
Physical Therapy  Facility/Department: [address]  Physical Therapy Daily Progress Note    Name: Suhas Leon  : 1925  MRN: [id number]  Date of Service: 2024  Chief Complaint   Patient presents with    Fatigue     Pt arrives by EMS from home with CC of sick. Weakness and fatigue. SOB. Occasional C/P.         Discharge Recommendations:  Patient would benefit from continued therapy after discharge   PT Equipment Recommendations  Equipment Needed: No (has 4WW)      Patient Diagnosis(es): The primary encounter diagnosis was Pneumonia due to infectious organism, unspecified laterality, unspecified part of lung. A diagnosis of Generalized weakness was also pertinent to this visit.  Past Medical History:  has a past medical history of BPH (benign prostatic hyperplasia), Cataracts, bilateral, COPD (chronic obstructive pulmonary disease) (HCC), Former smoker, Hearing loss, History of intermittent claudication, Hyperlipidemia, Intestinal infection due to Clostridium difficile, OA (osteoarthritis) of knee, Pneumonia, Prediabetes, and Spinal stenosis.  Past Surgical History:  has a past surgical history that includes Coronary artery bypass graft (); Eye surgery (); knee surgery (Bilateral); Tonsillectomy (193); and IR CHOLECYSTOSTOMY PERCUTANEOUS COMPLETE (2024).    Assessment   Body Structures, Functions, Activity Limitations Requiring Skilled Therapeutic Intervention: Decreased functional mobility ;Decreased safe awareness;Decreased strength;Decreased posture;Decreased endurance;Decreased balance  Assessment: Pt admitted from home (independent living) and was independent gait with 4ww. Pt recently discharged from [facility] after traumatic fall and gallbladder issues. Pt currently requiring Min A for bed mobility for trunk movement and intiiation of LE movement. Pt performed transfers with RW Min A from bed and Mod A from toilet. Pt ambulated 10'x3 Min A with RW with slight posterior lean throughout 
Physical Therapy  Facility/Department: [address]  Physical Therapy Daily Progress Note    Name: Suhas Leon  : 1925  MRN: [id number]  Date of Service: 2024  Chief Complaint   Patient presents with    Fatigue     Pt arrives by EMS from home with CC of sick. Weakness and fatigue. SOB. Occasional C/P.         Discharge Recommendations:  Patient would benefit from continued therapy after discharge   PT Equipment Recommendations  Equipment Needed: No (has 4WW)      Patient Diagnosis(es): The primary encounter diagnosis was Pneumonia due to infectious organism, unspecified laterality, unspecified part of lung. A diagnosis of Generalized weakness was also pertinent to this visit.  Past Medical History:  has a past medical history of BPH (benign prostatic hyperplasia), Cataracts, bilateral, COPD (chronic obstructive pulmonary disease) (HCC), Former smoker, Hearing loss, History of intermittent claudication, Hyperlipidemia, Intestinal infection due to Clostridium difficile, OA (osteoarthritis) of knee, Pneumonia, Prediabetes, and Spinal stenosis.  Past Surgical History:  has a past surgical history that includes Coronary artery bypass graft (); Eye surgery (); knee surgery (Bilateral); Tonsillectomy (193); and IR CHOLECYSTOSTOMY PERCUTANEOUS COMPLETE (2024).    Assessment   Body Structures, Functions, Activity Limitations Requiring Skilled Therapeutic Intervention: Decreased functional mobility ;Decreased safe awareness;Decreased strength;Decreased posture;Decreased endurance;Decreased balance  Assessment: Pt admitted from home (independent living) and was independent gait with 4ww. Pt recently discharged from [facility] after traumatic fall and gallbladder issues. Pt currently requiring Min A for bed mobility for trunk movement and initiation of LE movement. Pt performed transfers Min A with use of RW. Pt ambulated 4' and 6' Min A with RW with one LOB during second bout of ambulation from posterior 
Progress Note    Date:4/27/2024       Room:323/323-01  Patient Name:Suhas Leon     YOB: 1925     Age:89 y.o.    Subjective   Interval History Status: improved.     89 y/o  male PMH HFrEF - 20-25%, mild Pulmonary HTN with RVSP 33 (ECHO 11/2023), CAD s/p CABG x4 1993, NSVT on Amiodarone, previous traumatic fall s/p right hydropneumothorax 4/11/24 with thoracentesis 4/13/24, cholecystostomy tube placement with coverage vs K. Oxytoca ESBL, remote pubic rami fx with sacral decubitus, aspiration PNA with pneumonitis, chronic dysphagia.  Discharged from outside facility for aforementioned 4/16/24, admitted to [facility] 4/19/24 with worsening weakness/fatigue, acute hypoxemic respiratory failure, bilateral pleural effusions.  Hospital course involved Abx administration - Ceftriaxone/Azithromycin completed 4/25/24, diuresis - Lasix/Aldactone, weaning of O2 needs, improved dietary tolerance, cholecystostomy evaluation 4/25/24 - WNL.  Pulmonary service continues to follow.    - No acute lab anomalies from 4/26/24  - No acute overnight events   - Persistence of effusions - notably present since prior to 12/2023  - Reports no complaints   - Difficulty with chewing and reports poor dentition  - Occasional cough      Review of Systems   Review of Systems   Constitutional:  Positive for appetite change. Negative for activity change, chills, diaphoresis, fatigue and fever.   HENT:  Positive for dental problem and trouble swallowing. Negative for congestion, ear discharge, ear pain, facial swelling, hearing loss, mouth sores, nosebleeds and tinnitus.    Eyes:  Negative for photophobia, pain, discharge, redness and itching.   Respiratory:  Positive for cough and shortness of breath. Negative for apnea, choking, chest tightness, wheezing and stridor.    Cardiovascular:  Negative for chest pain, palpitations and leg swelling.   Gastrointestinal:  Negative for abdominal distention, abdominal pain and vomiting. 
Progress Note    Date:4/28/2024       Room:323/323-01  Patient Name:Suhas Leon     YOB: 1925     Age:89 y.o.    Subjective   Interval History Status: improved.     89 y/o  male PMH HFrEF - 20-25%, mild Pulmonary HTN with RVSP 33 (ECHO 11/2023), CAD s/p CABG x4 1993, NSVT on Amiodarone, previous traumatic fall s/p right hydropneumothorax 4/11/24 with thoracentesis 4/13/24, cholecystostomy tube placement with coverage vs K. Oxytoca ESBL, remote pubic rami fx with sacral decubitus, aspiration PNA with pneumonitis, chronic dysphagia.  Discharged from outside facility for aforementioned 4/16/24, admitted to [facility] 4/19/24 with worsening weakness/fatigue, acute hypoxemic respiratory failure, bilateral pleural effusions.  Hospital course involved Abx administration - Ceftriaxone/Azithromycin completed 4/25/24, diuresis - Lasix/Aldactone, weaning of O2 needs, improved dietary tolerance, cholecystostomy evaluation 4/25/24 - WNL.  Pulmonary service continues to follow.    - No acute overnight events   -       Review of Systems   Review of Systems   Constitutional:  Negative for activity change, appetite change, chills, diaphoresis, fatigue and fever.   HENT:  Positive for dental problem and trouble swallowing. Negative for congestion, ear discharge, ear pain, facial swelling, hearing loss, mouth sores, nosebleeds and tinnitus.    Eyes:  Negative for photophobia, pain, discharge, redness and itching.   Respiratory:  Positive for cough. Negative for apnea, choking, chest tightness, shortness of breath, wheezing and stridor.    Cardiovascular:  Negative for chest pain, palpitations and leg swelling.   Gastrointestinal:  Negative for abdominal distention, abdominal pain and vomiting.   Musculoskeletal:  Positive for gait problem. Negative for arthralgias and joint swelling.   Skin:  Negative for color change.   Neurological:  Positive for weakness. Negative for dizziness, tremors, seizures, syncope, facial 
Pt discharged via stretcher with all belongings, scripts, hearing aids, shoes/clothes and discharge paperwork. Discharge instructions reviewed with CHALO Melara. All questions answered to satisfaction.     
Report called to RN    
SPEECH LANGUAGE PATHOLOGY  Facility/Department: [address]   CLINICAL BEDSIDE SWALLOW EVALUATION    NAME: Suhas Leon  : 1925  MRN: [id number]    ADMISSION DATE: 2024  ADMITTING DIAGNOSIS: has Hyperlipidemia; Hypertensive heart and chronic kidney disease with heart failure (HCC); Chronic combined systolic and diastolic congestive heart failure (HCC); Pressure ulcer of sacral region, unspecified stage; OA (osteoarthritis) of knee; Cataracts, bilateral; Pneumonia due to infectious organism; BPH (benign prostatic hyperplasia); Intestinal infection due to Clostridium difficile; Former smoker; Hearing loss; Chronic renal disease, stage II; Spinal stenosis; Coronary artery disease involving coronary bypass graft of native heart; Bilateral pleural effusion; Acute on chronic systolic congestive heart failure (HCC); PVC's (premature ventricular contractions); NSVT (nonsustained ventricular tachycardia) (HCC); Aspiration pneumonia of left upper lobe (HCC); Acute kidney injury superimposed on chronic kidney disease (HCC); Functional diarrhea; Hypokalemia; Acute hypoxic respiratory failure (HCC); Severe protein-calorie malnutrition (HCC); Acute systolic congestive heart failure (HCC); Laceration of left forearm; Laceration of skin of scalp; Bradycardia; Protein calorie malnutrition; Heart failure (HCC); Other dysphagia; Fall; Aspiration pneumonia (HCC); and Generalized weakness on their problem list.    Recent Chest Xray/CT of Chest: (CXR ):  IMPRESSION:  Small bilateral pleural effusions and bibasilar airspace disease.  This could  represent a combination of atelectasis and/or pneumonia    Date of Eval: 2024  Evaluating Therapist: CHRISTINE Joshua    Current Diet level:  Current Diet : Pureed  Current Liquid Diet : Mildly Thick    Primary Complaint  Pt denies trouble swallowing, expresses dislike for thickened liquids and pureed solids.    Pain:  Pain Assessment  Pain Assessment: None - Denies 
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 (HCC) I13.0    Chronic combined systolic and diastolic congestive heart failure (HCC) I50.42    Pressure ulcer of sacral region, unspecified stage L89.159    OA (osteoarthritis) of knee M17.9    Cataracts, bilateral H26.9    Benign prostatic hyperplasia with incomplete bladder emptying N40.1, R39.14    Intestinal infection due to Clostridium difficile A04.72    Former smoker Z87.891    Hearing loss H91.90    Chronic kidney disease, stage 2 (mild) N18.2    Spinal stenosis M48.00    Coronary artery disease involving coronary bypass graft of native heart I25.810    Bilateral pleural effusion J90    Acute on chronic systolic congestive heart failure (HCC) I50.23    PVC's (premature ventricular contractions) I49.3    NSVT (nonsustained ventricular tachycardia) ([facility]) I47.29    Aspiration pneumonia of left upper lobe ([facility]) J69.0    Acute kidney injury superimposed on chronic kidney disease (HCC) N17.9, N18.9    Functional diarrhea K59.1    Hypokalemia E87.6    Acute hypoxic respiratory failure ([facility]) J96.01    Severe protein-calorie malnutrition (HCC) E43    Acute systolic congestive heart failure (HCC) I50.21    Laceration of left forearm S51.812A    Laceration of skin of scalp S01.01XA    Bradycardia R00.1    Protein calorie malnutrition E46    Heart failure (HCC) I50.9    Other dysphagia R13.19    Fall W19.XXXA    Aspiration pneumonia (HCC) J69.0    Generalized weakness R53.1    Cachexia ([facility]) R64    Cholecystostomy tube dysfunction T85.518A       Pain Rating: Patient did not appear or complain to be in pain.  Pain Location: N/A  Non-Pharmaceutical Pain Intervention: N/A    Dysphagia Treatment  Treatment time: 
SPIRITUAL CARE DEPARTMENT - [facility]  PROGRESS NOTE    Room # 323/323-01   Name: Suhas Leon               Reason for visit: Routine    I visited the patient.    Admit Date & Time: 4/19/2024  2:17 PM    Assessment:  Suhas Leon is a 89 y.o. male in the hospital because \"pneumonia\". Upon entering the room pt was lying in bed. Pt shared about current medical challenge. Pt also shared about his spouse. Pt said that his spouse has to take a taxi to hospital to see him. Pt inquired about whether or not there are any transportation services for his wife to get back and forth to hospital.       Intervention:  I introduced myself and my title as  I offered space for patient  to express feelings, needs, and concerns and provided a ministry presence.      Outcome:  PT thanked writer for visit. Writer will pass pt's expressed needs on to members of multi-disciplinary team    Plan:  Chaplains will remain available to offer spiritual and emotional support as needed.    Electronically signed by Chaplain Kyree, on 4/22/2024 at 2:50 PM.  Spiritual Care Department  [facility]       04/22/24 1449   Encounter Summary   Encounter Overview/Reason  Initial Encounter   Service Provided For: Patient   [facility]   Support System Spouse   Last Encounter  04/22/24   Complexity of Encounter Low   Begin Time 1310   End Time  1320   Total Time Calculated 10 min   Spiritual/Emotional needs   Type Spiritual Support   Assessment/Intervention/Outcome   Assessment Calm;Coping   Intervention Active listening;Sustaining Presence/Ministry of presence;Explored/Affirmed feelings, thoughts, concerns   Outcome Engaged in conversation;Expressed Gratitude;Coping   Plan and Referrals   Plan/Referrals Continue Support (comment)       
 [] Lethargic    Objective/Assessment:    Pt seen for dysphagia management. ST observed Pt for snack of mildly thick liquids via straw and puree solids via teaspoon. Pt demo latent cough with mildly thick via straw in 1/7 trials and throat clear/wet vocal quality following puree via teaspoon in 5/10 trials. Pt responded to min cues to utilize protective throat clear/re-swallow to eliminate wet vocal quality. ST instructed Pt in oral/pharyngeal/laryngeal exercises to strengthen swallowing mechanism. Pt responded to mod verbal cues to complete OMEX x 20 reps x 1 set and effortful swallow x 2 x 3 sets. Pt unable to complete [place] swallow at this time. Pt/spouse educated re: recommended diet, risks of aspiration, and plan of acre.       Plan:  [x] Continue ST services    [] Discharge from ST:        Discharge recommendations:  [x] Further therapy recommended at discharge.   [] No therapy recommended at discharge.         Treatment completed by: Hong Jain M.S., CCC-SLP   
reviewed. Wound Type: Skin Tears, Stage III, Stage II (Medical back skin tear, Right tidial lateral stage 2, coccyx stage 3.)       Current Nutrition Intake & Therapies:    Average Meal Intake: %  Average Supplements Intake: 51-75%  ADULT DIET; Dysphagia - Pureed; Low Fat/Low Chol/High Fiber/THIAGO; Mildly Thick (Nectar); 2000 ml  ADULT ORAL NUTRITION SUPPLEMENT; Lunch, Dinner; Wound Healing Oral Supplement  ADULT ORAL NUTRITION SUPPLEMENT; Breakfast, Lunch, Dinner; Frozen Oral Supplement    Anthropometric Measures:  Height: 177.8 cm (5' 10\")  Ideal Body Weight (IBW): 166 lbs (75 kg)    Admission Body Weight: 47.6 kg (105 lb)  Current Body Weight: 50.3 kg (111 lb), 66.9 % IBW. Weight Source: Bed Scale  Current BMI (kg/m2): 15.9  Usual Body Weight: 50.3 kg (111 lb)  % Weight Change (Calculated): 0  Weight Adjustment For: No Adjustment                 BMI Categories: Underweight (BMI less than 18.5)    Estimated Daily Nutrient Needs:  Energy Requirements Based On: Kcal/kg  Weight Used for Energy Requirements: Current  Energy (kcal/day): 2191-2818 kcals per day using 30-35 g/kg of actual body weight  Weight Used for Protein Requirements: Current  Protein (g/day):  grams per day using 1.5-2 g/kg of actual body weight due to wounds.  Method Used for Fluid Requirements: 1 ml/kcal  Fluid (ml/day): 0976-9812 ml per day using 30-35 ml/kg    Nutrition Diagnosis:   Inadequate protein intake related to inadequate protein-energy intake as evidenced by wounds    Nutrition Interventions:   Food and/or Nutrient Delivery: Continue Current Diet, Continue Oral Nutrition Supplement  Nutrition Education/Counseling: Education not indicated  Coordination of Nutrition Care: Continue to monitor while inpatient       Goals:  Previous Goal Met: Progressing toward Goal(s)  Goals: PO intake 75% or greater       Nutrition Monitoring and Evaluation:   Behavioral-Environmental Outcomes: None Identified  Food/Nutrient Intake Outcomes: Food 
training, Safety education & training, Equipment evaluation, education, & procurement, Patient/Caregiver education & training, Self-Care / ADL     Restrictions  Restrictions/Precautions  Restrictions/Precautions: Fall Risk, Bed Alarm  Required Braces or Orthoses?: No  Position Activity Restriction  Other position/activity restrictions: \"up with assistance\", (+) choley tube, [place]    Subjective   General  Patient assessed for rehabilitation services?: Yes  Family / Caregiver Present: No  Subjective  Subjective: Patient reports 7/10 mid back pain, 5-6/10 B foot pain. Patient positioned up in recliner with pillow behind back and LEs elevatef for increased comfort.  General Comment  Comments: Patient agreeable and cooperative. Fearful of falling, reassurance provided.       Safety Devices  Type of Devices: Call light within reach;Chair alarm in place;Gait belt;Nurse notified;Left in chair;Patient at risk for falls  Restraints  Restraints Initially in Place: No           ADL  Grooming: Minimal assistance  Grooming Skilled Clinical Factors: sitting in recliner, min A for thoroughness of combing back of head hair        Bed mobility  Supine to Sit: Minimal assistance  Scooting: Stand by assistance  Bed Mobility Comments: Pt sat on side of bed for ~4' with SBA, VCs for scooting  Transfers  Sit to stand: Minimal assistance  Stand to sit: Contact guard assistance  Transfer Comments: scoot to edge of bed with SBA, increased time and effort, VCs for walker and hand placement     Cognition  Overall Cognitive Status: Exceptions  Arousal/Alertness: Appropriate responses to stimuli  Following Commands: Follows all commands without difficulty  Attention Span: Appears intact  Memory: Appears intact  Safety Judgement: Decreased awareness of need for assistance;Decreased awareness of need for safety  Problem Solving: Assistance required to correct errors made;Assistance required to implement solutions  Insights: Decreased awareness of 
(static F+, dynamic P+, leaning to the R side this date dynamic)  Standing:  (static F(-), dynamic P to poor +, RW required)        ADL  LE Dressing: Minimal assistance;Moderate assistance  LE Dressing Skilled Clinical Factors: min A slipper socks seated in bedside chair  Toileting: Dependent/Total  Toileting Skilled Clinical Factors: boland catheter  Functional Mobility: Minimal assistance;Contact guard assistance  Functional Mobility Skilled Clinical Factors: for ambulation, short distance 2 bouts 6 feet each. Pt still voices fear of falling, no LOB noted neither forward or retrograde  Skin Care: N/A        Bed mobility  Supine to Sit: Minimal assistance;Contact guard assistance (R bed rail and HOB raised to 45o)  Sit to Supine: Minimal assistance (assist with B LE into bed)  Scooting: Stand by assistance  Bed Mobility Comments: found in bed and retired to bed at end of session        Cognition  Overall Cognitive Status: Exceptions  Arousal/Alertness: Appropriate responses to stimuli  Following Commands: Follows all commands without difficulty  Attention Span: Appears intact  Memory: Appears intact  Safety Judgement: Decreased awareness of need for assistance;Decreased awareness of need for safety  Problem Solving: Assistance required to correct errors made;Assistance required to implement solutions  Insights: Decreased awareness of deficits  Initiation: Requires cues for some  Sequencing: Requires cues for some  Cognition Comment: Pt is [place]  Orientation  Overall Orientation Status: Within Functional Limits  Orientation Level: Oriented X4                  Education Given To: Patient  Education Provided: Role of Therapy;Plan of Care;ADL Adaptive Strategies;Transfer Training;Fall Prevention Strategies;Precautions  Education Method: Demonstration;Verbal  Barriers to Learning: None;Hearing;Cognition  Education Outcome: Verbalized understanding;Continued education needed                                           AM-PAC 
Apartment (independent living at [facility])  Home Layout: One level  Home Access: Elevator  Bathroom Shower/Tub: Walk-in shower  Bathroom Toilet: Standard  Bathroom Equipment: Grab bars in shower, Grab bars around toilet  Home Equipment: Cane, Walker, rolling  Has the patient had two or more falls in the past year or any fall with injury in the past year?: Yes (Per pt, 3 falls \"recently\")  ADL Assistance: Needs assistance (Per pt, was independent until recently. Now receives occasional assist from wife)  Feeding: Independent  Toileting: Independent  Homemaking Responsibilities: No (wife does laundry. Facility cooks and cleans)  Ambulation Assistance: Independent (with 4ww)  Transfer Assistance: Independent  Active : Yes (has not driven recently)  Mode of Transportation: Car  Occupation: Retired  Type of Occupation: [email or web address]   Leisure & Hobbies: spending time with wife    Vision/Hearing  Vision  Vision: Impaired  Vision Exceptions: Wears glasses at all times  Hearing  Hearing: Exceptions to WFL  Hearing Exceptions: Hard of hearing/hearing concerns;Bilateral hearing aid      Cognition   Orientation  Overall Orientation Status: Within Functional Limits  Orientation Level: Oriented X4  Cognition  Overall Cognitive Status: Exceptions  Arousal/Alertness: Appropriate responses to stimuli  Following Commands: Follows all commands without difficulty  Attention Span: Appears intact  Memory: Appears intact  Safety Judgement: Decreased awareness of need for assistance;Decreased awareness of need for safety  Problem Solving: Assistance required to correct errors made;Assistance required to implement solutions  Insights: Decreased awareness of deficits  Initiation: Requires cues for some  Sequencing: Requires cues for some  Cognition Comment: Pt is [place]     Objective        Observation/Palpation  Edema: dangelo feet        AROM RLE (degrees)  RLE AROM: WFL  AROM LLE (degrees)  LLE AROM : WFL    Strength 
Comments: MIN A sit<>stand with use of RW; increased fatigue noted this PM    Cognition  Overall Cognitive Status: Exceptions  Arousal/Alertness: Appropriate responses to stimuli  Following Commands: Follows all commands without difficulty  Attention Span: Appears intact  Memory: Appears intact  Safety Judgement: Decreased awareness of need for assistance;Decreased awareness of need for safety  Problem Solving: Assistance required to correct errors made;Assistance required to implement solutions  Insights: Decreased awareness of deficits  Initiation: Requires cues for some  Sequencing: Requires cues for some  Cognition Comment: Pt is [place]  Orientation  Overall Orientation Status: Within Functional Limits  Orientation Level: Oriented X4    Education Given To: Patient;Family  Education Provided: Transfer Training;Equipment;Energy Conservation;Fall Prevention Strategies  Education Method: Demonstration;Verbal  Barriers to Learning: None;Hearing;Cognition  Education Outcome: Verbalized understanding;Continued education needed    AM-PAC - ADL  AM-PAC Daily Activity - Inpatient   How much help is needed for putting on and taking off regular lower body clothing?: A Lot  How much help is needed for bathing (which includes washing, rinsing, drying)?: A Lot  How much help is needed for toileting (which includes using toilet, bedpan, or urinal)?: A Lot  How much help is needed for putting on and taking off regular upper body clothing?: A Little  How much help is needed for taking care of personal grooming?: A Little  How much help for eating meals?: None  AM-PAC Inpatient Daily Activity Raw Score: 16  AM-PAC Inpatient ADL T-Scale Score : 35.96  ADL Inpatient CMS 0-100% Score: 53.32  ADL Inpatient CMS G-Code Modifier : CK    Goals  Short Term Goals  Time Frame for Short Term Goals: 14 visits  Short Term Goal 1: Pt will complete UB ADLs/grooming with MOD IND  Short Term Goal 2: Pt will complete LB ADLs/toileting with MOD 
30 degrees and use of bed rail)  Scooting: Stand by assistance (increase time and effort)  Bed Mobility Comments: Pt sat on side of bed for ~15' with SBA-CGA     Transfers  Sit to Stand: Minimal Assistance (x 4)   Stand to Sit: Minimal Assistance      Max standing tolerance 1 minute (x 2) with Juan José and RW throughout.     Ambulation  Surface: Level tile  Device: Rolling Walker  Assistance: Minimal assistance  Quality of Gait: Shuffling feet, thoracic kyphosis  Gait Deviations: Decreased step height;Decreased step length  Distance: 2ft     Balance  Posture: Fair  Sitting - Static: Good;-  Sitting - Dynamic: Fair;-  Standing - Static: Poor;+  Standing - Dynamic: Poor;+     Seated LE exercise program: Long Arc Quads, hip abduction/adduction, heel/toe raises, and marches. Reps: 15 with minimal cueing.     AM-PAC - Mobility  15/24     Goals  Short Term Goals  Time Frame for Short Term Goals: 14 visits  Short Term Goal 1: supine to and from sit independent  Short Term Goal 2: sit to and from stand independent  Short Term Goal 3: ambulate 40ft with rolling walker and modified independent  Patient Goals   Patient Goals : to feel safe     Education  Patient Education  Education Given To: Patient  Education Provided: Role of Therapy;Plan of Care;Transfer Training;Equipment  Education Provided Comments: upright posture  Education Method: Demonstration;Verbal;Teach Back  Barriers to Learning: Hearing  Education Outcome: Verbalized understanding;Demonstrated understanding;Continued education needed        Therapy Time    Individual Concurrent Group Co-treatment   Time In 1117         Time Out 1140         Minutes 23         Timed Code Treatment Minutes: 23 Minutes     Shanda Saha, PT  
513.81 ml   Output 900 ml   Net -386.19 ml         Microbiology:  No results for input(s): \"SPECDESC\", \"SPECIAL\", \"CULTURE\", \"STATUS\", \"ORG\", \"CDIFFTOXPCR\", \"CAMPYLOBPCR\", \"SALMONELLAPC\", \"SHIGAPCR\", \"SHIGELLAPCR\", \"MPNEUG\", \"MPNEUM\", \"LACTOQL\" in the last 72 hours.      Pathology:    Radiology reports:  XR CHEST PORTABLE   Final Result   Increased density involving the lower lung zones with blunting of the   costophrenic angles right greater than left. Findings may represent a   combination of pleural effusion and atelectasis.         CT CHEST WO CONTRAST   Final Result   1. Large right and moderate left pleural effusions and lower lobe atelectatic   changes.   2. Mild ascites.   3. Ectatic ascending thoracic aorta measuring up to 4 cm.   4. Coronary artery calcification.   5. Postthoracotomy changes.   6. No evidence of mediastinal lymphadenopathy.   7. No focal nodules or masses.         CT HEAD WO CONTRAST   Final Result   No acute intracranial abnormality. Paranasal sinus disease. Left mastoid   effusion.         XR CHEST PORTABLE   Final Result   Small bilateral pleural effusions and bibasilar airspace disease.  This could   represent a combination of atelectasis and/or pneumonia             Echocardiogram:   No results found for this or any previous visit.      Cardiac Catheterization:   No results found for this or any previous visit.      ASSESSMENT AND PLAN     Assessment:    //Bilateral pleural effusion  //Possible aspiration pneumonia  //Hypoxia in the setting of pleural effusion/aspiration improvement  //Acute on chronic systolic heart failure  //Dysphagia    Plan:    Hemodynamics labile  On nasal cannula  Continue supplemental oxygen to keep oxygen saturation greater than 92%  Continue diuresis with Lasix/Aldactone  Monitor intake/output, and electrolytes  Will recommend to continue conservative treatment for pleural effusions  Await IR evaluation of the cholecystostomy tube  Antimicrobials reviewed; 
Judgement: Decreased awareness of need for assistance;Decreased awareness of need for safety  Problem Solving: Assistance required to correct errors made;Assistance required to implement solutions  Insights: Decreased awareness of deficits  Initiation: Requires cues for some  Sequencing: Requires cues for some  Cognition Comment: Pt is [place]  Orientation  Overall Orientation Status: Within Functional Limits  Orientation Level: Oriented X4                  Education Given To: Patient  Education Provided: Role of Therapy;Plan of Care;ADL Adaptive Strategies;Transfer Training;Fall Prevention Strategies;Precautions  Education Method: Demonstration;Verbal  Barriers to Learning: None;Hearing;Cognition  Education Outcome: Verbalized understanding;Continued education needed                                                                            AM-PAC - ADL  AM-PAC Daily Activity - Inpatient   How much help is needed for putting on and taking off regular lower body clothing?: A Lot  How much help is needed for bathing (which includes washing, rinsing, drying)?: A Lot  How much help is needed for toileting (which includes using toilet, bedpan, or urinal)?: A Lot  How much help is needed for putting on and taking off regular upper body clothing?: A Little  How much help is needed for taking care of personal grooming?: A Little  How much help for eating meals?: None  AM-[facility] Inpatient Daily Activity Raw Score: 16  AM-PAC Inpatient ADL T-Scale Score : 35.96  ADL Inpatient CMS 0-100% Score: 53.32  ADL Inpatient CMS G-Code Modifier : CK           Goals  Short Term Goals  Time Frame for Short Term Goals: 14 visits  Short Term Goal 1: Pt will complete UB ADLs/grooming with MOD IND  Short Term Goal 2: Pt will complete LB ADLs/toileting with MOD IND  Short Term Goal 3: Pt will complete functional mobility/transfers with MOD IND in prep for ADL completion  Short Term Goal 4: Pt will improve dynamic standing balance to good for 
Mobility Comments: Pt sat on side of bed for ~3' with SBA     Transfers  Sit to Stand: Minimal Assistance  Stand to Sit: Minimal Assistance (attempts to sit prematurely, requiring verbal cueing and manual assistance to maintain upright posture prior to attempting to sit in chair)     Ambulation  Surface: Level tile  Device: Rolling Walker  Assistance: Minimal assistance  Quality of Gait: Shuffling feet, thoracic kyphosis  Gait Deviations: Decreased step height;Decreased step length  Distance: 5ft     Balance  Posture: Fair  Sitting - Static: Good;-  Sitting - Dynamic: Fair;-  Standing - Static: Poor;+  Standing - Dynamic: Poor;+    Seated LE exercise program: Long Arc Quads, hip abduction/adduction, heel/toe raises, and marches. Reps: 15 with minimal cueing.       AM-PAC - Mobility  15/24     Goals  Short Term Goals  Time Frame for Short Term Goals: 14 visits  Short Term Goal 1: supine to and from sit independent  Short Term Goal 2: sit to and from stand independent  Short Term Goal 3: ambulate 40ft with rolling walker and modified independent  Patient Goals   Patient Goals : to feel safe     Education  Patient Education  Education Given To: Patient  Education Provided: Role of Therapy;Plan of Care;Transfer Training;Equipment  Education Provided Comments: upright posture  Education Method: Demonstration;Verbal;Teach Back  Barriers to Learning: Hearing  Education Outcome: Verbalized understanding;Demonstrated understanding;Continued education needed        Therapy Time    Individual Concurrent Group Co-treatment   Time In 0854      Time Out 0920      Minutes 26      Timed Code Treatment Minutes: 26 Minutes     Shanda Saha, PT          
using your arms?: A Little  How much help is needed walking in hospital room?: A Lot  How much help is needed climbing 3-5 steps with a railing?: Total  AM-PAC Inpatient Mobility Raw Score : 15  AM-PAC Inpatient T-Scale Score : 39.45  Mobility Inpatient CMS 0-100% Score: 57.7  Mobility Inpatient CMS G-Code Modifier : CK     Goals  Short Term Goals  Time Frame for Short Term Goals: 14 visits  Short Term Goal 1: supine to and from sit independent  Short Term Goal 2: sit to and from stand independent  Short Term Goal 3: ambulate 40ft with rolling walker and modified independent  Patient Goals   Patient Goals : to feel safe     Education  Patient Education  Education Given To: Patient  Education Provided: Transfer Training;Fall Prevention Strategies  Education Provided Comments: Importance of movement, importance to ambulate slower and not rush to decrease falls, hand placement during transfers.  Education Method: Demonstration;Verbal  Barriers to Learning: Hearing  Education Outcome: Verbalized understanding;Demonstrated understanding;Continued education needed        Therapy Time    Individual Concurrent Group Co-treatment   Time In 1341      Time Out 1407      Minutes 26      Timed Code Treatment Minutes: 26 Minutes    Shanda Saha PT    
MD  Family Medicine Resident, PGY-3   4/26/2024  5:04 PM    
assessment and plan with the intern listed above and the attending Marcello Elise MD. I have reviewed the note and have included any edits or additional information above.     Merced Askew MD  Family Medicine Resident, PGY-3   4/23/2024  8:54 PM    Attending Physician Statement  Patient seen with the team on rounds on 4/23/2024  .  Patient resting comfortably in bed.  Wife at the bedside.  I helped instruct him in utilizing his incentive spirometer.  Tomorrow we will contact surgery at [facility] for definitive follow-up plan.  We will also consult radiology interventional to be sure tube was placed correctly.  I have seen and discussed the care of Suhas Leon  including pertinent history and exam findings, with the resident. I have reviewed the key elements of all parts of the encounter with the resident at the time of the encounter. I agree with the assessment, plan and orders as documented by the resident.    Marcello Elise MD   4/23/2024  10:29 PM      
    Current Diet: Diet NPO  Dietician consult:  N/A    Discharge Plan:  Placement for patient upon discharge: skilled nursing   Patient appropriate for Outpatient Wound Care Center: No    Patient/Caregiver Teaching:  Level of patientunderstanding able to:     [x] Indicates understanding       [] Needs reinforcement  [] Unsuccessful      [] Verbal Understanding  [] Demonstrated understanding       [] No evidence of learning  [] Refused teaching         [] N/A       Electronically signed by PATRICK ARROYO RN on  4/25/2024 at 12:03 PM

## 2024-04-29 NOTE — DISCHARGE SUMMARY
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)    Pressure ulcer of sacral region, unspecified stage    Benign prostatic hyperplasia with incomplete bladder emptying    Former smoker    Chronic kidney disease, stage 2 (mild)    Bilateral pleural effusion    Severe protein-calorie malnutrition (HCC)    Other dysphagia    Fall    Aspiration pneumonia (HCC)    Generalized weakness    Cachexia (HCC)    Cholecystostomy tube dysfunction    Chronic heart failure with preserved ejection fraction (HCC)  Resolved Problems:    OWEN (acute kidney injury) (HCC)      Admission Condition:  poor     Discharged Condition: fair    Hospital Stay:     Hospital Course:  Suhas Leon is a 89 y.o. male w/ PMH HFpEF, CKD II, pressure ulcer on sacrum, and placement of cholecystotomy during recent hospitalization at [facility]. Nurse caring for patient at [facility] reported pt was unable to get up and walk around for the past 1-2 days, not eating or drinking much.     Upon presentation to the ED, vitals were wnl.  Labwork included CBC, CMP, Magnesium level, lipase, lactate, TSH, troponins, covid/flu swab, and UA. Abnormalities includedhgb 12.1, creatinine 1.3, troponin 25 and 24, albumin 2.6, alk phos 143, and UA had few bacteria. EKG done, showed evidence of MI in septal and anterior leads. CXR done indicated atelectasis vs pneumonia. Because of recent h/o falls, CT head wo contrast

## 2024-04-30 ENCOUNTER — TELEPHONE (OUTPATIENT)
Age: 89
End: 2024-04-30

## 2024-04-30 NOTE — TELEPHONE ENCOUNTER
Veronica from [facility] called and stated that the patient is complaining of having bilateral testicle pain. Patient is currently prescribed Tylenol for pain his general pain. She stated that patient isn't asking for any pain medication she was just reporting his complaints.

## 2024-04-30 NOTE — TELEPHONE ENCOUNTER
Called over to [facility]. Denies any urinary symptoms or skin changes. Did discuss should the pain worsen to send him to the ED. Will be seeing him tomorrow for Nursing home admission.

## 2024-05-01 ENCOUNTER — OUTSIDE SERVICES (OUTPATIENT)
Age: 89
End: 2024-05-01

## 2024-05-01 VITALS
HEART RATE: 55 BPM | DIASTOLIC BLOOD PRESSURE: 61 MMHG | RESPIRATION RATE: 18 BRPM | OXYGEN SATURATION: 94 % | SYSTOLIC BLOOD PRESSURE: 115 MMHG | TEMPERATURE: 98.8 F

## 2024-05-01 DIAGNOSIS — J69.0 ASPIRATION PNEUMONIA OF BOTH LOWER LOBES DUE TO GASTRIC SECRETIONS (HCC): ICD-10-CM

## 2024-05-01 DIAGNOSIS — I50.42 CHRONIC COMBINED SYSTOLIC AND DIASTOLIC CONGESTIVE HEART FAILURE (HCC): ICD-10-CM

## 2024-05-01 DIAGNOSIS — L89.152 PRESSURE INJURY OF SACRAL REGION, STAGE 2 (HCC): ICD-10-CM

## 2024-05-01 DIAGNOSIS — R00.1 BRADYCARDIA: ICD-10-CM

## 2024-05-01 DIAGNOSIS — I25.810 CORONARY ARTERY DISEASE INVOLVING CORONARY BYPASS GRAFT OF NATIVE HEART WITHOUT ANGINA PECTORIS: ICD-10-CM

## 2024-05-01 DIAGNOSIS — I13.0 HYPERTENSIVE HEART AND CHRONIC KIDNEY DISEASE WITH HEART FAILURE (HCC): Primary | ICD-10-CM

## 2024-05-01 NOTE — PROGRESS NOTES
CORONARY ARTERY BYPASS GRAFT  1989    EYE SURGERY      Cataracts    IR CHOLECYSTOSTOMY PERCUTANEOUS COMPLETE  2024    IR CHOLECYSTOSTOMY PERCUTANEOUS COMPLETE [facility] CATH/IR    KNEE SURGERY Bilateral     TONSILLECTOMY          Social History     Socioeconomic History    Marital status:    Tobacco Use    Smoking status: Former     Current packs/day: 0.00     Types: Cigarettes     Start date: 1943     Quit date: 1950     Years since quittin.3    Smokeless tobacco: Never   Vaping Use    Vaping Use: Never used   Substance and Sexual Activity    Alcohol use: Yes     Comment: 1-2 almost daily    Drug use: Never     Social Determinants of Health     Financial Resource Strain: Low Risk  (2023)    Overall Financial Resource Strain (CARDIA)     Difficulty of Paying Living Expenses: Not hard at all   Food Insecurity: No Food Insecurity (2024)    Hunger Vital Sign     Worried About Running Out of Food in the Last Year: Never true     Ran Out of Food in the Last Year: Never true   Transportation Needs: No Transportation Needs (2024)    PRAPARE - Transportation     Lack of Transportation (Medical): No     Lack of Transportation (Non-Medical): No   Housing Stability: Low Risk  (2024)    Housing Stability Vital Sign     Unable to Pay for Housing in the Last Year: No     Number of Places Lived in the Last Year: 1     Unstable Housing in the Last Year: No        PHYSICAL EXAM     /61   Pulse 55   Temp 98.8 °F (37.1 °C)   Resp 18   SpO2 94% Comment: 2L via NC     Physical Exam  Constitutional:       Appearance: Normal appearance. He is underweight. He is not ill-appearing or toxic-appearing.      Interventions: Nasal cannula in place.      Comments: 2L   HENT:      Head: Normocephalic and atraumatic.   Eyes:      General: No scleral icterus.     Extraocular Movements: Extraocular movements intact.      Conjunctiva/sclera: Conjunctivae normal.

## 2024-05-02 ASSESSMENT — ENCOUNTER SYMPTOMS
COUGH: 0
DIARRHEA: 0
VOMITING: 0
CONSTIPATION: 0
SHORTNESS OF BREATH: 0
ABDOMINAL PAIN: 0
NAUSEA: 0

## 2024-05-02 NOTE — PATIENT INSTRUCTIONS
Will follow at [facility] weekly   Has been bradycardic will keep hold parameters for the Metoprolol at <55   He has been complaining of sacral pain and did consult wound care to come evaluate and treat   Recommend frequent turning   He has been able to get up and use the restroom and continue to encourage ambulation with monitoring for lightheadedness and dizziness  Continue with dysphagia diet.   QTc in the hospital was >500 and would hold on any antiemetics if he is having continued nausea and is not just an isolated incident.

## 2024-05-06 ENCOUNTER — OUTSIDE SERVICES (OUTPATIENT)
Age: 89
End: 2024-05-06

## 2024-05-06 VITALS
TEMPERATURE: 96.9 F | OXYGEN SATURATION: 96 % | HEART RATE: 67 BPM | RESPIRATION RATE: 19 BRPM | DIASTOLIC BLOOD PRESSURE: 64 MMHG | SYSTOLIC BLOOD PRESSURE: 110 MMHG

## 2024-05-06 DIAGNOSIS — I13.0 HYPERTENSIVE HEART AND CHRONIC KIDNEY DISEASE WITH HEART FAILURE (HCC): Primary | ICD-10-CM

## 2024-05-06 DIAGNOSIS — L89.152 PRESSURE INJURY OF SACRAL REGION, STAGE 2 (HCC): ICD-10-CM

## 2024-05-06 DIAGNOSIS — Z87.891 FORMER SMOKER: ICD-10-CM

## 2024-05-06 DIAGNOSIS — R53.1 WEAKNESS: ICD-10-CM

## 2024-05-06 DIAGNOSIS — R00.1 BRADYCARDIA: ICD-10-CM

## 2024-05-06 DIAGNOSIS — I50.42 CHRONIC COMBINED SYSTOLIC AND DIASTOLIC CONGESTIVE HEART FAILURE (HCC): ICD-10-CM

## 2024-05-06 DIAGNOSIS — I25.810 CORONARY ARTERY DISEASE INVOLVING CORONARY BYPASS GRAFT OF NATIVE HEART WITHOUT ANGINA PECTORIS: ICD-10-CM

## 2024-05-06 NOTE — PROGRESS NOTES
knee     Pneumonia     Prediabetes     Spinal stenosis        Past Surgical History:   Procedure Laterality Date    CORONARY ARTERY BYPASS GRAFT  1989    EYE SURGERY  1991    Cataracts    IR CHOLECYSTOSTOMY PERCUTANEOUS COMPLETE  2024    IR CHOLECYSTOSTOMY PERCUTANEOUS COMPLETE [facility] CARDIAC CATH/IR    KNEE SURGERY Bilateral     TONSILLECTOMY          Social History     Socioeconomic History    Marital status:    Tobacco Use    Smoking status: Former     Current packs/day: 0.00     Types: Cigarettes     Start date: 1943     Quit date: 1950     Years since quittin.3    Smokeless tobacco: Never   Vaping Use    Vaping Use: Never used   Substance and Sexual Activity    Alcohol use: Yes     Comment: 1-2 almost daily    Drug use: Never     Social Determinants of Health     Financial Resource Strain: Low Risk  (2023)    Overall Financial Resource Strain (CARDIA)     Difficulty of Paying Living Expenses: Not hard at all   Food Insecurity: No Food Insecurity (2024)    Hunger Vital Sign     Worried About Running Out of Food in the Last Year: Never true     Ran Out of Food in the Last Year: Never true   Transportation Needs: No Transportation Needs (2024)    PRAPARE - Transportation     Lack of Transportation (Medical): No     Lack of Transportation (Non-Medical): No   Housing Stability: Low Risk  (2024)    Housing Stability Vital Sign     Unable to Pay for Housing in the Last Year: No     Number of Places Lived in the Last Year: 1     Unstable Housing in the Last Year: No        PHYSICAL EXAM     /64 Comment: 1 hour prior had a 87/52  Pulse 67   Temp 96.9 °F (36.1 °C)   Resp 19   SpO2 96% Comment: on 2L     Physical Exam  Constitutional:       General: He is not in acute distress.     Appearance: Normal appearance. He is not ill-appearing, toxic-appearing or diaphoretic.   HENT:      Head: Normocephalic and atraumatic.   Cardiovascular:      Rate and Rhythm:

## 2024-05-07 ENCOUNTER — TELEPHONE (OUTPATIENT)
Age: 89
End: 2024-05-07

## 2024-05-07 DIAGNOSIS — K59.09 OTHER CONSTIPATION: Primary | ICD-10-CM

## 2024-05-07 ASSESSMENT — ENCOUNTER SYMPTOMS
COUGH: 0
BLOOD IN STOOL: 0
DIARRHEA: 0
NAUSEA: 0
CONSTIPATION: 0
SHORTNESS OF BREATH: 0
ABDOMINAL PAIN: 0
VOMITING: 0

## 2024-05-07 NOTE — PATIENT INSTRUCTIONS
Will follow at [facility] weekly   With having hold parametes on the metoprolol Bradycardia has improved will continue this  Wound care has been consulted and is addressing the sacral wound.   Recommend frequent turning   Ordered saline flush of cholecystostomy tube  States that occasionally with getting up he has lightheadedness will adjust spironolactone to 12.5mg   Continue with dysphagia diet.   QTc in the hospital was >500 and would hold on any antiemetics if he is having continued nausea and is not just an isolated incident.

## 2024-05-07 NOTE — TELEPHONE ENCOUNTER
Saritha from [facility] called and wanted to know if she can get an order for this patient to help him have a bowel movement. Please fax to [phone]

## 2024-05-08 RX ORDER — POLYETHYLENE GLYCOL 3350 17 G/17G
17 POWDER, FOR SOLUTION ORAL DAILY
Qty: 510 G | Refills: 0 | Status: SHIPPED | OUTPATIENT
Start: 2024-05-08 | End: 2024-06-07

## 2024-05-08 NOTE — TELEPHONE ENCOUNTER
Printed script for Miralax to take once daily. If we can fax over to [facility]. I will sign script now. Thank you!

## 2024-05-10 ENCOUNTER — TELEPHONE (OUTPATIENT)
Age: 89
End: 2024-05-10

## 2024-05-10 NOTE — TELEPHONE ENCOUNTER
Called Speech therapy evaluated today and will be switching him to a moderately thickened liquid his cough is improving and his labs dont show leukocytosis. Has not required an increase in O2 supplementation. Sounding better. Labs and imaging results were faxed over today.

## 2024-05-10 NOTE — TELEPHONE ENCOUNTER
I answered after hour center call regarding Suhas Leon  Nurse Marcelino from [facility] had sent message: \" X-ray results… Need callback: Needs call back\"   I called [facility] back.     Marcelino was calling to let us know the results of a chest x-ray that was done today after patient choked on some of his food.   Per RN Patient is breathing normally without difficulty at his baseline.  No new or increased shortness of breath, oxygen saturation 96%,  afebrile, no chest pain, nausea, vomiting.  No other new symptoms or concerns reported.   Portable 1 view CXR done at bedside with result: Right base infiltrate and effusion.   Patient has behaved at his normal baseline, nurse was asking what labs to order for later this morning during business hours.    During this call verbal orders for  BMP, CBC, pro-calcitonin  Instructed nurse to have facility call the office number if patient has any symptoms or worsens, and also to be sure to fax lab results and imaging results to our office at [phone] attention to his PCP Dr. Elise.     Patient so far has been asymptomatic, he does have history of aspiration pneumonia, has had swallow study in recent past and patient should be on diet with puréed foods and thickened liquids . let nurse know I will forward this note to patient's provider.  Patient may need further evaluation for possible recurrent aspiration pneumonia.    Answered any questions nurse had.

## 2024-05-11 ENCOUNTER — TELEPHONE (OUTPATIENT)
Age: 89
End: 2024-05-11

## 2024-05-11 NOTE — TELEPHONE ENCOUNTER
Center Call received on 5/11/2024 at 1:59 PM  Message contents: \"Ptn having increased abdominal pain. Ptn has a belly drain.\" - Saritha from [facility]    Attempted to call back at 2:10 PM, caller unavailable. Will try again. Attempted to call again at 2:40 PM, and was directed to caller. Confirmed patient identifiers.    89 yo male with history of HFpEF, CKD II, sacral pressure ulcer, and recent cholecystostomy tube placement on 4/15/24.     Patient complained of some right lower quadrant pain near tube site. Vitals within normal limits. No pain with palpation of the abdomen, skin intact and not erythematous, swollen, warm or tender. Bowel sounds present. Drain flushed well. Patient is eating and drinking appropriately. He did have a bowel movement today. Nurse gave Tylenol around 2pm and he is now sleeping.    Advised nurse to monitor for changes to vitals and signs of infection around drain site. Should he start having fevers, chills, change in mentation, worsening abdominal pain, nausea, vomiting, decreased/absent bowel movements, obtain abdominal XR and notify physician on call. Otherwise, continue to monitor. Nurse agreeable to plan.     Sent to PCP as ROMAINE

## 2024-05-14 ENCOUNTER — OUTSIDE SERVICES (OUTPATIENT)
Age: 89
End: 2024-05-14

## 2024-05-14 DIAGNOSIS — I13.0 HYPERTENSIVE HEART AND CHRONIC KIDNEY DISEASE WITH HEART FAILURE (HCC): ICD-10-CM

## 2024-05-14 DIAGNOSIS — R53.1 WEAKNESS: ICD-10-CM

## 2024-05-14 DIAGNOSIS — E43 SEVERE PROTEIN-CALORIE MALNUTRITION (HCC): ICD-10-CM

## 2024-05-14 DIAGNOSIS — L89.153 PRESSURE INJURY OF SACRAL REGION, STAGE 3 (HCC): Primary | ICD-10-CM

## 2024-05-14 DIAGNOSIS — Z87.891 FORMER SMOKER: ICD-10-CM

## 2024-05-14 DIAGNOSIS — I50.42 CHRONIC COMBINED SYSTOLIC AND DIASTOLIC CONGESTIVE HEART FAILURE (HCC): ICD-10-CM

## 2024-05-16 ENCOUNTER — TELEPHONE (OUTPATIENT)
Age: 89
End: 2024-05-16

## 2024-05-16 VITALS
DIASTOLIC BLOOD PRESSURE: 59 MMHG | SYSTOLIC BLOOD PRESSURE: 121 MMHG | TEMPERATURE: 97 F | HEART RATE: 60 BPM | OXYGEN SATURATION: 97 %

## 2024-05-16 ASSESSMENT — ENCOUNTER SYMPTOMS
DIARRHEA: 0
SHORTNESS OF BREATH: 0
COUGH: 0
NAUSEA: 0
ABDOMINAL PAIN: 0

## 2024-05-16 NOTE — TELEPHONE ENCOUNTER
Called nurse at [facility] to ask about when surgery consult would be done and if they would come to the nursing home, they will look into this but as of yet nothing done. It does appear that they have still agreed to a PEG tube. Spoke with wound care NP who recommends outpatient wound care with possible debridement. She will send referral to [facility] at [facility].

## 2024-05-16 NOTE — PROGRESS NOTES
Socioeconomic History    Marital status:    Tobacco Use    Smoking status: Former     Current packs/day: 0.00     Types: Cigarettes     Start date: 1943     Quit date: 1950     Years since quittin.4    Smokeless tobacco: Never   Vaping Use    Vaping Use: Never used   Substance and Sexual Activity    Alcohol use: Yes     Comment: 1-2 almost daily    Drug use: Never     Social Determinants of Health     Financial Resource Strain: Low Risk  (2023)    Overall Financial Resource Strain (CARDIA)     Difficulty of Paying Living Expenses: Not hard at all   Food Insecurity: No Food Insecurity (2024)    Hunger Vital Sign     Worried About Running Out of Food in the Last Year: Never true     Ran Out of Food in the Last Year: Never true   Transportation Needs: No Transportation Needs (2024)    PRAPARE - Transportation     Lack of Transportation (Medical): No     Lack of Transportation (Non-Medical): No   Housing Stability: Low Risk  (2024)    Housing Stability Vital Sign     Unable to Pay for Housing in the Last Year: No     Number of Places Lived in the Last Year: 1     Unstable Housing in the Last Year: No        PHYSICAL EXAM     BP (!) 121/59   Pulse 60   Temp 97 °F (36.1 °C)   SpO2 97% Comment: 2L     Physical Exam  Constitutional:       General: He is not in acute distress.     Appearance: Normal appearance. He is underweight. He is not ill-appearing, toxic-appearing or diaphoretic.   Eyes:      General: No scleral icterus.     Extraocular Movements: Extraocular movements intact.      Conjunctiva/sclera: Conjunctivae normal.   Cardiovascular:      Rate and Rhythm: Normal rate.      Heart sounds: No murmur heard.     No friction rub. No gallop.   Pulmonary:      Breath sounds: Rhonchi (right lower lobe most predominant) present. No wheezing or rales.      Comments: 2L  Musculoskeletal:         General: No tenderness.      Right lower leg: No edema.      Left lower leg: No

## 2024-05-16 NOTE — PATIENT INSTRUCTIONS
Will follow at [facility] weekly   Consulted General Surgery for Cholecystostomy tube evaluation and treat with hopeful removal of galbladder.   Bradycardia continues to be   Wound care has been consulted and is addressing the sacral wound. Currently a stage 3 ulcer and he has pain.   Recommend frequent turning   Continue saline flush of cholecystostomy tube  BP medications have hold parameters and blood pressure is much improved  Continue with dysphagia diet.   QTc in the hospital was >500 and would hold on any antiemetics if he is having continued nausea and is not just an isolated incident.

## 2024-05-17 ENCOUNTER — APPOINTMENT (OUTPATIENT)
Dept: GENERAL RADIOLOGY | Age: 89
DRG: 177 | End: 2024-05-17
Payer: COMMERCIAL

## 2024-05-17 ENCOUNTER — HOSPITAL ENCOUNTER (INPATIENT)
Age: 89
LOS: 3 days | DRG: 177 | End: 2024-05-20
Attending: EMERGENCY MEDICINE | Admitting: FAMILY MEDICINE
Payer: COMMERCIAL

## 2024-05-17 DIAGNOSIS — J69.0 ASPIRATION PNEUMONIA OF RIGHT LOWER LOBE, UNSPECIFIED ASPIRATION PNEUMONIA TYPE (HCC): Primary | ICD-10-CM

## 2024-05-17 PROBLEM — R94.31 QT PROLONGATION: Status: ACTIVE | Noted: 2024-05-17

## 2024-05-17 PROBLEM — J43.8 OTHER EMPHYSEMA (HCC): Chronic | Status: ACTIVE | Noted: 2024-05-17

## 2024-05-17 LAB
ALBUMIN SERPL-MCNC: 2.7 G/DL (ref 3.5–5.2)
ALBUMIN/GLOB SERPL: 0.6 {RATIO} (ref 1–2.5)
ALP SERPL-CCNC: 95 U/L (ref 40–129)
ALT SERPL-CCNC: 25 U/L (ref 5–41)
ANION GAP SERPL CALCULATED.3IONS-SCNC: 10 MMOL/L (ref 9–17)
AST SERPL-CCNC: 25 U/L
BASOPHILS # BLD: 0.1 K/UL (ref 0–0.2)
BASOPHILS NFR BLD: 1 % (ref 0–2)
BILIRUB SERPL-MCNC: 0.7 MG/DL (ref 0.3–1.2)
BUN SERPL-MCNC: 27 MG/DL (ref 8–23)
CALCIUM SERPL-MCNC: 8.6 MG/DL (ref 8.6–10.4)
CHLORIDE SERPL-SCNC: 104 MMOL/L (ref 98–107)
CO2 SERPL-SCNC: 28 MMOL/L (ref 20–31)
CREAT SERPL-MCNC: 1.1 MG/DL (ref 0.7–1.2)
EOSINOPHIL # BLD: 0 K/UL (ref 0–0.4)
EOSINOPHILS RELATIVE PERCENT: 0 % (ref 1–4)
ERYTHROCYTE [DISTWIDTH] IN BLOOD BY AUTOMATED COUNT: 15.9 % (ref 12.5–15.4)
GFR, ESTIMATED: 61 ML/MIN/1.73M2
GLUCOSE BLD-MCNC: 44 MG/DL (ref 75–110)
GLUCOSE BLD-MCNC: 96 MG/DL (ref 75–110)
GLUCOSE SERPL-MCNC: 66 MG/DL (ref 70–99)
HCT VFR BLD AUTO: 32.5 % (ref 41–53)
HGB BLD-MCNC: 10.8 G/DL (ref 13.5–17.5)
LACTATE BLDV-SCNC: 1.1 MMOL/L (ref 0.5–1.9)
LYMPHOCYTES NFR BLD: 2.2 K/UL (ref 1–4.8)
LYMPHOCYTES RELATIVE PERCENT: 16 % (ref 24–44)
MCH RBC QN AUTO: 31.9 PG (ref 26–34)
MCHC RBC AUTO-ENTMCNC: 33.2 G/DL (ref 31–37)
MCV RBC AUTO: 96 FL (ref 80–100)
MONOCYTES NFR BLD: 1 K/UL (ref 0.1–1.2)
MONOCYTES NFR BLD: 7 % (ref 2–11)
NEUTROPHILS NFR BLD: 76 % (ref 36–66)
NEUTS SEG NFR BLD: 10.4 K/UL (ref 1.8–7.7)
PLATELET # BLD AUTO: 223 K/UL (ref 140–450)
PMV BLD AUTO: 8 FL (ref 6–12)
POTASSIUM SERPL-SCNC: 3.7 MMOL/L (ref 3.7–5.3)
PROCALCITONIN SERPL-MCNC: 0.16 NG/ML (ref 0–0.09)
PROT SERPL-MCNC: 7.1 G/DL (ref 6.4–8.3)
RBC # BLD AUTO: 3.38 M/UL (ref 4.5–5.9)
SODIUM SERPL-SCNC: 142 MMOL/L (ref 135–144)
TROPONIN I SERPL HS-MCNC: 36 NG/L (ref 0–22)
WBC OTHER # BLD: 13.7 K/UL (ref 3.5–11)

## 2024-05-17 PROCEDURE — 83605 ASSAY OF LACTIC ACID: CPT

## 2024-05-17 PROCEDURE — 71045 X-RAY EXAM CHEST 1 VIEW: CPT

## 2024-05-17 PROCEDURE — 36415 COLL VENOUS BLD VENIPUNCTURE: CPT

## 2024-05-17 PROCEDURE — 99222 1ST HOSP IP/OBS MODERATE 55: CPT | Performed by: FAMILY MEDICINE

## 2024-05-17 PROCEDURE — 82947 ASSAY GLUCOSE BLOOD QUANT: CPT

## 2024-05-17 PROCEDURE — 6360000002 HC RX W HCPCS: Performed by: EMERGENCY MEDICINE

## 2024-05-17 PROCEDURE — 96374 THER/PROPH/DIAG INJ IV PUSH: CPT

## 2024-05-17 PROCEDURE — 74018 RADEX ABDOMEN 1 VIEW: CPT

## 2024-05-17 PROCEDURE — 84484 ASSAY OF TROPONIN QUANT: CPT

## 2024-05-17 PROCEDURE — 80053 COMPREHEN METABOLIC PANEL: CPT

## 2024-05-17 PROCEDURE — 93005 ELECTROCARDIOGRAM TRACING: CPT

## 2024-05-17 PROCEDURE — 87040 BLOOD CULTURE FOR BACTERIA: CPT

## 2024-05-17 PROCEDURE — 84145 PROCALCITONIN (PCT): CPT

## 2024-05-17 PROCEDURE — 85025 COMPLETE CBC W/AUTO DIFF WBC: CPT

## 2024-05-17 PROCEDURE — 99285 EMERGENCY DEPT VISIT HI MDM: CPT

## 2024-05-17 PROCEDURE — 2580000003 HC RX 258

## 2024-05-17 PROCEDURE — 2580000003 HC RX 258: Performed by: EMERGENCY MEDICINE

## 2024-05-17 PROCEDURE — 2060000000 HC ICU INTERMEDIATE R&B

## 2024-05-17 RX ORDER — AMIODARONE HYDROCHLORIDE 200 MG/1
200 TABLET ORAL DAILY
Status: DISCONTINUED | OUTPATIENT
Start: 2024-05-17 | End: 2024-05-20 | Stop reason: HOSPADM

## 2024-05-17 RX ORDER — KETOROLAC TROMETHAMINE 30 MG/ML
30 INJECTION, SOLUTION INTRAMUSCULAR; INTRAVENOUS EVERY 6 HOURS PRN
Status: CANCELLED | OUTPATIENT
Start: 2024-05-17 | End: 2024-05-22

## 2024-05-17 RX ORDER — ACETAMINOPHEN 325 MG/1
650 TABLET ORAL EVERY 6 HOURS PRN
Status: DISCONTINUED | OUTPATIENT
Start: 2024-05-17 | End: 2024-05-18

## 2024-05-17 RX ORDER — LANOLIN ALCOHOL/MO/W.PET/CERES
3 CREAM (GRAM) TOPICAL NIGHTLY
Status: DISCONTINUED | OUTPATIENT
Start: 2024-05-17 | End: 2024-05-20 | Stop reason: HOSPADM

## 2024-05-17 RX ORDER — FLUTICASONE PROPIONATE 50 MCG
1 SPRAY, SUSPENSION (ML) NASAL DAILY
Status: DISCONTINUED | OUTPATIENT
Start: 2024-05-17 | End: 2024-05-20 | Stop reason: HOSPADM

## 2024-05-17 RX ORDER — ACETAMINOPHEN 650 MG/1
650 SUPPOSITORY RECTAL EVERY 6 HOURS PRN
Status: DISCONTINUED | OUTPATIENT
Start: 2024-05-17 | End: 2024-05-18

## 2024-05-17 RX ORDER — SENNOSIDES A AND B 8.6 MG/1
1 TABLET, FILM COATED ORAL DAILY PRN
Status: DISCONTINUED | OUTPATIENT
Start: 2024-05-17 | End: 2024-05-20 | Stop reason: HOSPADM

## 2024-05-17 RX ORDER — DEXTROSE MONOHYDRATE 100 MG/ML
INJECTION, SOLUTION INTRAVENOUS CONTINUOUS PRN
Status: DISCONTINUED | OUTPATIENT
Start: 2024-05-17 | End: 2024-05-20 | Stop reason: HOSPADM

## 2024-05-17 RX ORDER — SODIUM CHLORIDE 0.9 % (FLUSH) 0.9 %
5-40 SYRINGE (ML) INJECTION EVERY 12 HOURS SCHEDULED
Status: DISCONTINUED | OUTPATIENT
Start: 2024-05-17 | End: 2024-05-20 | Stop reason: HOSPADM

## 2024-05-17 RX ORDER — HEPARIN SODIUM 5000 [USP'U]/ML
5000 INJECTION, SOLUTION INTRAVENOUS; SUBCUTANEOUS 2 TIMES DAILY
Status: DISCONTINUED | OUTPATIENT
Start: 2024-05-17 | End: 2024-05-19

## 2024-05-17 RX ORDER — SODIUM CHLORIDE 9 MG/ML
INJECTION, SOLUTION INTRAVENOUS PRN
Status: DISCONTINUED | OUTPATIENT
Start: 2024-05-17 | End: 2024-05-20 | Stop reason: HOSPADM

## 2024-05-17 RX ORDER — FINASTERIDE 5 MG/1
5 TABLET, FILM COATED ORAL DAILY
Status: DISCONTINUED | OUTPATIENT
Start: 2024-05-17 | End: 2024-05-20 | Stop reason: HOSPADM

## 2024-05-17 RX ORDER — ONDANSETRON 4 MG/1
4 TABLET, ORALLY DISINTEGRATING ORAL EVERY 8 HOURS PRN
Status: DISCONTINUED | OUTPATIENT
Start: 2024-05-17 | End: 2024-05-20 | Stop reason: HOSPADM

## 2024-05-17 RX ORDER — METOPROLOL SUCCINATE 25 MG/1
12.5 TABLET, EXTENDED RELEASE ORAL NIGHTLY
Status: DISCONTINUED | OUTPATIENT
Start: 2024-05-17 | End: 2024-05-20 | Stop reason: HOSPADM

## 2024-05-17 RX ORDER — ONDANSETRON 2 MG/ML
4 INJECTION INTRAMUSCULAR; INTRAVENOUS EVERY 6 HOURS PRN
Status: DISCONTINUED | OUTPATIENT
Start: 2024-05-17 | End: 2024-05-20 | Stop reason: HOSPADM

## 2024-05-17 RX ORDER — POLYETHYLENE GLYCOL 3350 17 G/17G
17 POWDER, FOR SOLUTION ORAL DAILY
Status: DISCONTINUED | OUTPATIENT
Start: 2024-05-17 | End: 2024-05-20 | Stop reason: HOSPADM

## 2024-05-17 RX ORDER — SODIUM CHLORIDE 9 MG/ML
INJECTION, SOLUTION INTRAVENOUS CONTINUOUS
Status: DISCONTINUED | OUTPATIENT
Start: 2024-05-17 | End: 2024-05-20 | Stop reason: HOSPADM

## 2024-05-17 RX ORDER — HEPARIN SODIUM 5000 [USP'U]/ML
5000 INJECTION, SOLUTION INTRAVENOUS; SUBCUTANEOUS 2 TIMES DAILY
Status: DISCONTINUED | OUTPATIENT
Start: 2024-05-17 | End: 2024-05-17

## 2024-05-17 RX ORDER — LISINOPRIL 5 MG/1
2.5 TABLET ORAL DAILY
Status: DISCONTINUED | OUTPATIENT
Start: 2024-05-17 | End: 2024-05-19

## 2024-05-17 RX ORDER — 0.9 % SODIUM CHLORIDE 0.9 %
1000 INTRAVENOUS SOLUTION INTRAVENOUS ONCE
Status: COMPLETED | OUTPATIENT
Start: 2024-05-17 | End: 2024-05-17

## 2024-05-17 RX ORDER — SPIRONOLACTONE 25 MG/1
12.5 TABLET ORAL DAILY
Status: DISCONTINUED | OUTPATIENT
Start: 2024-05-17 | End: 2024-05-20 | Stop reason: HOSPADM

## 2024-05-17 RX ORDER — FUROSEMIDE 20 MG/1
20 TABLET ORAL DAILY
Status: DISCONTINUED | OUTPATIENT
Start: 2024-05-17 | End: 2024-05-20 | Stop reason: HOSPADM

## 2024-05-17 RX ORDER — ECHINACEA PURPUREA EXTRACT 125 MG
1 TABLET ORAL PRN
Status: DISCONTINUED | OUTPATIENT
Start: 2024-05-17 | End: 2024-05-20 | Stop reason: HOSPADM

## 2024-05-17 RX ORDER — GUAIFENESIN 600 MG/1
1200 TABLET, EXTENDED RELEASE ORAL 2 TIMES DAILY PRN
COMMUNITY

## 2024-05-17 RX ORDER — GLUCAGON 1 MG/ML
1 KIT INJECTION PRN
Status: DISCONTINUED | OUTPATIENT
Start: 2024-05-17 | End: 2024-05-20 | Stop reason: HOSPADM

## 2024-05-17 RX ORDER — KETOROLAC TROMETHAMINE 15 MG/ML
15 INJECTION, SOLUTION INTRAMUSCULAR; INTRAVENOUS ONCE
Status: COMPLETED | OUTPATIENT
Start: 2024-05-17 | End: 2024-05-17

## 2024-05-17 RX ORDER — SODIUM CHLORIDE 0.9 % (FLUSH) 0.9 %
5-40 SYRINGE (ML) INJECTION PRN
Status: DISCONTINUED | OUTPATIENT
Start: 2024-05-17 | End: 2024-05-20 | Stop reason: HOSPADM

## 2024-05-17 RX ADMIN — SODIUM CHLORIDE 1000 ML: 9 INJECTION, SOLUTION INTRAVENOUS at 14:42

## 2024-05-17 RX ADMIN — KETOROLAC TROMETHAMINE 15 MG: 15 INJECTION, SOLUTION INTRAMUSCULAR; INTRAVENOUS at 14:51

## 2024-05-17 RX ADMIN — DEXTROSE MONOHYDRATE 125 ML: 100 INJECTION, SOLUTION INTRAVENOUS at 23:01

## 2024-05-17 ASSESSMENT — PAIN DESCRIPTION - LOCATION
LOCATION: COCCYX
LOCATION: SACRUM

## 2024-05-17 ASSESSMENT — PAIN SCALES - GENERAL
PAINLEVEL_OUTOF10: 10
PAINLEVEL_OUTOF10: 5
PAINLEVEL_OUTOF10: 3

## 2024-05-17 ASSESSMENT — PAIN DESCRIPTION - DESCRIPTORS: DESCRIPTORS: SHARP

## 2024-05-17 ASSESSMENT — PAIN - FUNCTIONAL ASSESSMENT: PAIN_FUNCTIONAL_ASSESSMENT: 0-10

## 2024-05-17 NOTE — CONSULTS
GENERAL SURGERY CONSULTATION- [facility] Inpatient      Patient's Name/ Date of Birth/ Gender: Suhas Leon / 8/14/1925 (89 y.o.) / male     PCP: Marcello Elise MD  Referring:     History of present Illness:  Patient is a pleasant 89 y.o. male brought in from [place] [facility] evening. Failed swallow study. Was to see Dr. Mendenhall next week for PEG tube. Had percutaneous cholecystostomy by IR 4/19/2024. Surgery consulted for PEG tube placement.     Past Medical History:  has a past medical history of Aspiration pneumonia of right lower lobe due to gastric secretions (HCC), BPH (benign prostatic hyperplasia), Cataracts, bilateral, COPD (chronic obstructive pulmonary disease) (HCC), Former smoker, Hearing loss, History of intermittent claudication, Hyperlipidemia, Intestinal infection due to Clostridium difficile, OA (osteoarthritis) of knee, Pneumonia, Prediabetes, and Spinal stenosis.    Past Surgical History:   Past Surgical History:   Procedure Laterality Date    CORONARY ARTERY BYPASS GRAFT  1989    EYE SURGERY  1991    Cataracts    IR CHOLECYSTOSTOMY PERCUTANEOUS COMPLETE  4/25/2024    IR CHOLECYSTOSTOMY PERCUTANEOUS COMPLETE [facility] CARDIAC CATH/IR    KNEE SURGERY Bilateral     TONSILLECTOMY  1931       Social History:  reports that he quit smoking about 74 years ago. His smoking use included cigarettes. He started smoking about 81 years ago. He has never used smokeless tobacco. He reports current alcohol use. He reports that he does not use drugs.    Family History: family history includes Cancer in his mother; Heart Disease in his father; Parkinson's Disease in his father.    Review of Systems:   General: Completed and, except as mentioned above, was negative or noncontributory  Psychological:  Completed and, except as mentioned above, was negative or noncontributory  Ophthalmic:  Completed and, except as mentioned above, was negative or noncontributory  ENT:  Completed and, except as mentioned above,  2137    polyethylene glycol (GLYCOLAX) packet 17 g, 17 g, Oral, Daily, Ruddy Jalloh, , 17 g at 05/18/24 0943    senna (SENOKOT) tablet 8.6 mg, 1 tablet, Oral, Daily PRN, Ruddy Jalloh DO    amiodarone (CORDARONE) tablet 200 mg, 200 mg, Oral, Daily, Ruddy Jalloh, , 200 mg at 05/18/24 0942    [Held by provider] finasteride (PROSCAR) tablet 5 mg, 5 mg, Oral, Daily, Ruddy Jalloh DO    fluticasone (FLONASE) 50 MCG/ACT nasal spray 1 spray, 1 spray, Each Nostril, Daily, Ruddy Jalloh DO    furosemide (LASIX) tablet 20 mg, 20 mg, Oral, Daily, Ruddy Jalloh DO, 20 mg at 05/18/24 0942    lisinopril (PRINIVIL;ZESTRIL) tablet 2.5 mg, 2.5 mg, Oral, Daily, Ruddy Jalloh DO, 2.5 mg at 05/18/24 0942    metoprolol succinate (TOPROL XL) extended release tablet 12.5 mg, 12.5 mg, Oral, Nightly, Ruddy Jalloh DO, 12.5 mg at 05/18/24 0238    spironolactone (ALDACTONE) tablet 12.5 mg, 12.5 mg, Oral, Daily, Ruddy Jalloh DO, 12.5 mg at 05/18/24 0943    sodium chloride (OCEAN) 0.65 % nasal spray 1 spray, 1 spray, Nasal, PRN, Ruddy Jalloh DO    [Held by provider] empagliflozin (JARDIANCE) tablet 10 mg, 10 mg, Oral, Daily, Ruddy Jalloh DO    heparin (porcine) injection 5,000 Units, 5,000 Units, SubCUTAneous, BID, Ruddy Jalloh DO, 5,000 Units at 05/18/24 2136    0.9 % sodium chloride infusion, , IntraVENous, Continuous, Ruddy Jalloh DO, Last Rate: 90 mL/hr at 05/18/24 1850, Rate Verify at 05/18/24 1850    glucose chewable tablet 16 g, 4 tablet, Oral, PRN, Ruddy Jalloh,     dextrose bolus 10% 125 mL, 125 mL, IntraVENous, PRN, Stopped at 05/17/24 6178 **OR** dextrose bolus 10% 250 mL, 250 mL, IntraVENous, PRN, Ruddy Jalloh,     glucagon injection 1 mg, 1 mg, SubCUTAneous, PRN, Ruddy Jalloh,     dextrose 10 % infusion, , IntraVENous, Continuous PRN, Ruddy Jalloh,

## 2024-05-17 NOTE — H&P
[facility]  Inpatient Service     HISTORY AND PHYSICAL EXAMINATION            Date:   5/17/2024  Patient name:  Suhas Leon  Date of admission:  5/17/2024 12:16 PM  MRN:   [id number]  Account:  [id number]  YOB: 1925  PCP:    Marcello Elise MD  Room:   [address] [place] [address]  Code Status:    DNR-CCA  Ashley Farrell (Spouse) [phone] (Mobile)     History Obtained From:     patient, spouse, electronic medical record    History of Present Illness:     Suhas Leon is a 89 y.o. Non- / non  male with who presents with Shortness of Breath (Sent from [facility]. Possible fluid in L lung ) and failed swallow eval (Per [facility] pt to have consult with Dr Mendenhall 5/21 for per tube placement)  and is admitted to the hospital for the management of Aspiration pneumonia (HCC).    Patient presented from SNF due to increased weakness, fatigue, and dyspnea secondary to limited dietary intake since being made n.p.o. at facility due to progressive dysphagia.  Patient has history of aspiration pneumonia, last admitted at [facility] in 4/19/24-4/29/24.  Patient has a current cholecystostomy due to prior cholecystitis and was scheduled to see general surgery for further evaluation.  He was hoping to also be evaluated for outpatient PEG tube placement but was unable to schedule for several days. Patient reported having difficulty chewing, and having failed swallow test at his facility.    In the ED, patient patient was afebrile, with unlabored breathing on 1 liter as needed. Significant lab results including a BUN of 37, glucose of 66, albumin of 2.7, WBC of 13.7, an absolute neutrophil count of 10.4, and negative lactic acid.  Troponin  of 36 was mildly elevated over baseline of low 30s. QTc mild prolongation at 456, X-ray shows stable right pleural effusion and basilar airspace suggestive of atelectasis or pneumonia, and small left pleural  meds, with blood pressures climbing  - Given patient's advanced age, poor nutritional status, multiple cardiac conditions, and not having had his amiodarone or lisinopril since 5/16 or his metoprolol since 5/14, he is at high risk of quickly becoming unstable, and will need closer monitoring than placement in Med/Surg.  - HEART score of 7, 50-65% mortality  - HALIMA Risk in >3%  - hold Jardiance 3 days prior to surgery  - Pharmacy consulted   - Cardio consulted  - amiodarone via NG/PEG tube  - Telemetry  - K >4, Mg >2    COPD, baseline room air  - was on one 1L NC in ED  - pulsox  - oxygen as needed  - if he deteriorates, from a pulmonary standpoint, will plan to consult pulm and treat according to GOLD guidelines    Hyperlipidemia  - update statan     Hearing loss  Spinal stenosis  - will monitor    BPH  - Monitor I/O.  - if symptomatic or not there is a net input of over 1L, will need to check post void residual with plan to straight cath.  - if patient does not tolerate, will consider boland  - Hold finasteride --> do not crush,    Pain control  - IV toradol or morphine     Former smoker.    Code Status: DNR-CCA  Anticoagulation: heparin 5,000 bid, stop after Sunday in preparation for surgery    Brief Disposition Planning    What setting did patient present to hospital from? Long term care/SNF - [facility]  Has PT/OT been consulted to further assess patient's therapy needs at discharge and/or appropriate next level of care? Yes  Does patient have a healthcare DPOA? No, however patient has stated they want wife Ashley Leon to be a decision maker if necessary. Their phone number is [phone].  Does patient have a living will? No  Does patient have clear decision making capacity on admission? Yes  Based on limited initial assessment, is it anticipated that patient may have continued care needs at discharge? yes   Care after discharge was briefly discussed with patient, and at this time they anticipate the

## 2024-05-17 NOTE — PROGRESS NOTES
Spoke with resident this evening about patient- if a PEG tube is desired by patient/family then would plan to add on for Monday 5/20 for EGD/PEG due to failed swallow study. Can do NGT for tube feeds, medical management/clearance, no blood thinners/no aspirin, currently in surgery at another hospital, I will see this patient in the morning. Please call if questions    Full consult to follow.  Dr Ramirez  [phone]

## 2024-05-17 NOTE — PROGRESS NOTES
Pharmacy Review  NPO Recommendations    Jaridance- Hold for at least 3 days before surgery, do not crush hold until can tolerate PO meds    Amiodarone- Can hold tablets for a couple days if needed. IV therapy available     Proscar- Hold until can tolerate PO meds    Furosemide- IV push formulation available.     Lisinopril- Ok to crush    Toprol XL- Change to Lopressor 12.5 mg daily. Lopressor ok to crush    Spironolactone- Ok to crush    Chad Boston Pharm D.  5/17/2024  7:47 PM

## 2024-05-17 NOTE — TELEPHONE ENCOUNTER
Interval note.  I went to [place] to see the patient at approximately 11 AM this morning.  Wife was at the bedside as well as the nurse Emily.  We had a discussion about placement of G-tube versus hospice.  I informed the patient at this time that he probably had a recurrence of his pneumonia.  I further stated to him that he does need nutrition if he is going to get over the pneumonia as well as his sacral ulcer.  I did explain to him and his wife that there are no guarantees with a feeding tube that he would not aspirate but that it would give him a better chance for nutrition.  I did explain that the IV with the normal saline was only to keep him hydrated but that he would not have any nutrition through that line.  They both voiced good understanding.  After discussion he and his wife both decided on G-tube placement as well as reevaluation for possible reemergence of his pneumonia.  Therefore he will be transported back to [facility] via EMS and I have transported his wife myself over to the emergency department to await his arrival.

## 2024-05-17 NOTE — ED PROVIDER NOTES
[facility] Emergency Department  [address]  [place] [address]  Phone: [phone]  Fax: [phone]      Pt Name: Suhas Leon  MRN:[id number]  Birthdate 8/14/1925  Date of evaluation: 5/17/2024      CHIEF COMPLAINT       Chief Complaint   Patient presents with    Shortness of Breath     Sent from [facility]. Possible fluid in L lung     failed swallow eval     Per [facility] pt to have consult with Dr Mendenhall 5/21 for per tube placement       HISTORY OF PRESENT ILLNESS   89-year-old male presents to the emergency department today from the nursing home because of increased cough.  Patient does report it is little more short of breath than his typical baseline.  He denies any fevers chills.  He recently had a swallow study which she has failed.  They recommend a PEG but he cannot see surgery for 1 week and therefore his doctor sent him into the emergency department.  He denies any abdominal pain.  He does complain of fatigue and generalized weakness.  Is been no other contemporaneous evaluation or management of the symptoms prior to arrival.    REVIEW OF SYSTEMS     Review of Systems   All other systems reviewed and are negative.        PAST MEDICAL HISTORY    has a past medical history of BPH (benign prostatic hyperplasia), Cataracts, bilateral, COPD (chronic obstructive pulmonary disease) (HCC), Former smoker, Hearing loss, History of intermittent claudication, Hyperlipidemia, Intestinal infection due to Clostridium difficile, OA (osteoarthritis) of knee, Pneumonia, Prediabetes, and Spinal stenosis.    SURGICAL HISTORY      has a past surgical history that includes Coronary artery bypass graft (1989); Eye surgery (1991); knee surgery (Bilateral); Tonsillectomy (1931); and IR CHOLECYSTOSTOMY PERCUTANEOUS COMPLETE (4/25/2024).    CURRENT MEDICATIONS       Previous Medications    ACETAMINOPHEN (TYLENOL) 325 MG TABLET    Take 2 tablets by mouth every 6 hours as needed for Pain ? Dose   as

## 2024-05-18 PROBLEM — Z43.4 CHOLECYSTOSTOMY CARE (HCC): Status: ACTIVE | Noted: 2024-05-18

## 2024-05-18 PROBLEM — J69.0 ASPIRATION PNEUMONIA OF RIGHT LOWER LOBE DUE TO GASTRIC SECRETIONS (HCC): Status: ACTIVE | Noted: 2024-05-17

## 2024-05-18 LAB
ANION GAP SERPL CALCULATED.3IONS-SCNC: 11 MMOL/L (ref 9–17)
BASOPHILS # BLD: 0 K/UL (ref 0–0.2)
BASOPHILS NFR BLD: 0 % (ref 0–2)
BUN SERPL-MCNC: 29 MG/DL (ref 8–23)
CALCIUM SERPL-MCNC: 8.1 MG/DL (ref 8.6–10.4)
CHLORIDE SERPL-SCNC: 109 MMOL/L (ref 98–107)
CO2 SERPL-SCNC: 22 MMOL/L (ref 20–31)
CREAT SERPL-MCNC: 0.9 MG/DL (ref 0.7–1.2)
EOSINOPHIL # BLD: 0.13 K/UL (ref 0–0.4)
EOSINOPHILS RELATIVE PERCENT: 1 % (ref 1–4)
ERYTHROCYTE [DISTWIDTH] IN BLOOD BY AUTOMATED COUNT: 15.3 % (ref 12.5–15.4)
GFR, ESTIMATED: 77 ML/MIN/1.73M2
GLUCOSE BLD-MCNC: 100 MG/DL (ref 75–110)
GLUCOSE BLD-MCNC: 90 MG/DL (ref 75–110)
GLUCOSE SERPL-MCNC: 89 MG/DL (ref 70–99)
HCT VFR BLD AUTO: 34.9 % (ref 41–53)
HGB BLD-MCNC: 11.1 G/DL (ref 13.5–17.5)
LYMPHOCYTES NFR BLD: 2.02 K/UL (ref 1–4.8)
LYMPHOCYTES RELATIVE PERCENT: 16 % (ref 24–44)
MAGNESIUM SERPL-MCNC: 2.1 MG/DL (ref 1.6–2.6)
MCH RBC QN AUTO: 31.9 PG (ref 26–34)
MCHC RBC AUTO-ENTMCNC: 31.8 G/DL (ref 31–37)
MCV RBC AUTO: 100.3 FL (ref 80–100)
MONOCYTES NFR BLD: 1.01 K/UL (ref 0.1–1.2)
MONOCYTES NFR BLD: 8 % (ref 2–11)
MORPHOLOGY: NORMAL
NEUTROPHILS NFR BLD: 75 % (ref 36–66)
NEUTS SEG NFR BLD: 9.44 K/UL (ref 1.8–7.7)
PHOSPHATE SERPL-MCNC: 3.3 MG/DL (ref 2.5–4.5)
PLATELET # BLD AUTO: 180 K/UL (ref 140–450)
PMV BLD AUTO: 10.5 FL (ref 8–14)
POTASSIUM SERPL-SCNC: 4.1 MMOL/L (ref 3.7–5.3)
RBC # BLD AUTO: 3.48 M/UL (ref 4.5–5.9)
SODIUM SERPL-SCNC: 142 MMOL/L (ref 135–144)
TRIGL SERPL-MCNC: 79 MG/DL
TROPONIN I SERPL HS-MCNC: 32 NG/L (ref 0–22)
WBC OTHER # BLD: 12.6 K/UL (ref 3.5–11)

## 2024-05-18 PROCEDURE — 97162 PT EVAL MOD COMPLEX 30 MIN: CPT

## 2024-05-18 PROCEDURE — 84100 ASSAY OF PHOSPHORUS: CPT

## 2024-05-18 PROCEDURE — 6360000002 HC RX W HCPCS

## 2024-05-18 PROCEDURE — 83735 ASSAY OF MAGNESIUM: CPT

## 2024-05-18 PROCEDURE — 99232 SBSQ HOSP IP/OBS MODERATE 35: CPT | Performed by: FAMILY MEDICINE

## 2024-05-18 PROCEDURE — 84484 ASSAY OF TROPONIN QUANT: CPT

## 2024-05-18 PROCEDURE — 36415 COLL VENOUS BLD VENIPUNCTURE: CPT

## 2024-05-18 PROCEDURE — 6370000000 HC RX 637 (ALT 250 FOR IP)

## 2024-05-18 PROCEDURE — 97535 SELF CARE MNGMENT TRAINING: CPT

## 2024-05-18 PROCEDURE — C9113 INJ PANTOPRAZOLE SODIUM, VIA: HCPCS

## 2024-05-18 PROCEDURE — 97166 OT EVAL MOD COMPLEX 45 MIN: CPT

## 2024-05-18 PROCEDURE — 2060000000 HC ICU INTERMEDIATE R&B

## 2024-05-18 PROCEDURE — 84478 ASSAY OF TRIGLYCERIDES: CPT

## 2024-05-18 PROCEDURE — 2580000003 HC RX 258

## 2024-05-18 PROCEDURE — 80048 BASIC METABOLIC PNL TOTAL CA: CPT

## 2024-05-18 PROCEDURE — A4216 STERILE WATER/SALINE, 10 ML: HCPCS

## 2024-05-18 PROCEDURE — 85025 COMPLETE CBC W/AUTO DIFF WBC: CPT

## 2024-05-18 PROCEDURE — 97530 THERAPEUTIC ACTIVITIES: CPT

## 2024-05-18 PROCEDURE — 82947 ASSAY GLUCOSE BLOOD QUANT: CPT

## 2024-05-18 RX ORDER — ACETAMINOPHEN 650 MG/1
650 SUPPOSITORY RECTAL EVERY 6 HOURS PRN
Status: DISCONTINUED | OUTPATIENT
Start: 2024-05-18 | End: 2024-05-18

## 2024-05-18 RX ORDER — ACETAMINOPHEN 650 MG/1
650 SUPPOSITORY RECTAL EVERY 6 HOURS SCHEDULED
Status: DISCONTINUED | OUTPATIENT
Start: 2024-05-18 | End: 2024-05-20 | Stop reason: HOSPADM

## 2024-05-18 RX ORDER — ACETAMINOPHEN 325 MG/1
650 TABLET ORAL EVERY 6 HOURS SCHEDULED
Status: DISCONTINUED | OUTPATIENT
Start: 2024-05-18 | End: 2024-05-20 | Stop reason: HOSPADM

## 2024-05-18 RX ORDER — ACETAMINOPHEN 325 MG/1
650 TABLET ORAL EVERY 6 HOURS PRN
Status: DISCONTINUED | OUTPATIENT
Start: 2024-05-18 | End: 2024-05-18

## 2024-05-18 RX ADMIN — FUROSEMIDE 20 MG: 20 TABLET ORAL at 09:42

## 2024-05-18 RX ADMIN — HEPARIN SODIUM 5000 UNITS: 5000 INJECTION INTRAVENOUS; SUBCUTANEOUS at 00:00

## 2024-05-18 RX ADMIN — SODIUM CHLORIDE, PRESERVATIVE FREE 40 MG: 5 INJECTION INTRAVENOUS at 09:43

## 2024-05-18 RX ADMIN — SODIUM CHLORIDE, PRESERVATIVE FREE 40 MG: 5 INJECTION INTRAVENOUS at 02:38

## 2024-05-18 RX ADMIN — SODIUM CHLORIDE: 9 INJECTION, SOLUTION INTRAVENOUS at 16:21

## 2024-05-18 RX ADMIN — SPIRONOLACTONE 12.5 MG: 25 TABLET, FILM COATED ORAL at 09:43

## 2024-05-18 RX ADMIN — POLYETHYLENE GLYCOL 3350 17 G: 17 POWDER, FOR SOLUTION ORAL at 09:43

## 2024-05-18 RX ADMIN — HEPARIN SODIUM 5000 UNITS: 5000 INJECTION INTRAVENOUS; SUBCUTANEOUS at 21:36

## 2024-05-18 RX ADMIN — ACETAMINOPHEN 650 MG: 325 TABLET ORAL at 18:12

## 2024-05-18 RX ADMIN — SODIUM CHLORIDE: 9 INJECTION, SOLUTION INTRAVENOUS at 00:03

## 2024-05-18 RX ADMIN — Medication 3 MG: at 21:37

## 2024-05-18 RX ADMIN — ACETAMINOPHEN 650 MG: 325 TABLET ORAL at 09:44

## 2024-05-18 RX ADMIN — AMIODARONE HYDROCHLORIDE 200 MG: 200 TABLET ORAL at 09:42

## 2024-05-18 RX ADMIN — METOPROLOL SUCCINATE 12.5 MG: 25 TABLET, EXTENDED RELEASE ORAL at 02:38

## 2024-05-18 RX ADMIN — HEPARIN SODIUM 5000 UNITS: 5000 INJECTION INTRAVENOUS; SUBCUTANEOUS at 09:43

## 2024-05-18 RX ADMIN — LISINOPRIL 2.5 MG: 5 TABLET ORAL at 09:42

## 2024-05-18 ASSESSMENT — PAIN DESCRIPTION - LOCATION
LOCATION: GENERALIZED

## 2024-05-18 ASSESSMENT — PAIN DESCRIPTION - DESCRIPTORS
DESCRIPTORS: ACHING
DESCRIPTORS: ACHING

## 2024-05-18 ASSESSMENT — PAIN SCALES - GENERAL
PAINLEVEL_OUTOF10: 3
PAINLEVEL_OUTOF10: 2
PAINLEVEL_OUTOF10: 2
PAINLEVEL_OUTOF10: 3
PAINLEVEL_OUTOF10: 3
PAINLEVEL_OUTOF10: 5

## 2024-05-18 ASSESSMENT — PAIN - FUNCTIONAL ASSESSMENT
PAIN_FUNCTIONAL_ASSESSMENT: PREVENTS OR INTERFERES SOME ACTIVE ACTIVITIES AND ADLS
PAIN_FUNCTIONAL_ASSESSMENT: PREVENTS OR INTERFERES WITH ALL ACTIVE AND SOME PASSIVE ACTIVITIES

## 2024-05-18 NOTE — PROGRESS NOTES
[facility]  Inpatient Service     Progress Note  5/18/2024    8:04 AM    Name:   Suhas Leon  MRN:     [id number]     Acct:      [id number]   Room:   323/323-01   Day:  1  Admit Date:  5/17/2024 12:16 PM    PCP:   Marcello Elise MD  Code Status:  DNR-CCA    Subjective:     Overnight events: None    Pt was examined at bedside with no acute complaints of shortness of breath, but states he feels a tightness in his chest.  Is feeling pain on his sacral wound and asked for Tylenol this morning.  Has no other acute complaints at this time denying abdominal pain, nausea, vomiting.    Medications:     Allergies:  No Known Allergies    Current Meds:   Scheduled Meds:    acetaminophen  650 mg Per NG tube 4 times per day    Or    acetaminophen  650 mg Rectal 4 times per day    sodium chloride flush  5-40 mL IntraVENous 2 times per day    melatonin  3 mg Oral Nightly    polyethylene glycol  17 g Oral Daily    amiodarone  200 mg Oral Daily    [Held by provider] finasteride  5 mg Oral Daily    fluticasone  1 spray Each Nostril Daily    furosemide  20 mg Oral Daily    lisinopril  2.5 mg Oral Daily    metoprolol succinate  12.5 mg Oral Nightly    spironolactone  12.5 mg Oral Daily    [Held by provider] empagliflozin  10 mg Oral Daily    heparin (porcine)  5,000 Units SubCUTAneous BID    pantoprazole (PROTONIX) 40 mg in sodium chloride (PF) 0.9 % 10 mL injection  40 mg IntraVENous Daily     Continuous Infusions:    sodium chloride      sodium chloride 90 mL/hr at 05/18/24 0003    dextrose       PRN Meds: sodium chloride flush, sodium chloride, ondansetron **OR** ondansetron, senna, sodium chloride, glucose, dextrose bolus **OR** dextrose bolus, glucagon (rDNA), dextrose    ROS:   ROS is negative except for points noted above.    Data:     Vitals:  BP (!) 142/62   Pulse 61   Temp 97.5 °F (36.4 °C) (Oral)   Resp 16   Ht 1.753 m (5' 9\")   Wt 44.9 kg (98 lb 15.8 oz)

## 2024-05-18 NOTE — H&P
Cholecystostomy care (HCC) 5/18/2024 Yes        Plan:     Aspiration pneumonia secondary to dysphagia versus Chemical Pneumonitis  Reactive Leukocytosis  Bcx x2 pending  Procalcitonin 0.16, will hold off Abx at this time.   WBC 13.7 repeat 12.6  CXR, repeat tomorrow AM  Will check CMP tomorrow    Severe protein-calorie malnutrition  Generalized weakness  Cachexia  NG tube in place, Feeding through NG per dietary  Dietary and general surgery consulted  PEG placement, likely surgery on Monday 5/20  IVF NS at 90 mL/hr  Continue Protonix 40 mg IV daily    Elevated troponin  Initial troponin 36=> 32  Likely type 2 NSTEMI, Similarly elevated on previous admissions    Sacral pressure ulcer  Wound care consulted for sacral wound  Tylenol q6hrs, through NG tube for pain control     HFpEF  CAD  Q-T prolongation  Cardiology consulted  amiodarone via NG/PEG tube  hold Jardiance    BPH  Hold finasteride --> do not crush    Anticoagulation: heparin 5000 units BID  Dispo: SNF  Diet: Diet NPO  ADULT TUBE FEEDING; Nasogastric; Standard with Fiber; Continuous; 20; No; 20; Q 3 hours    Patient was seen, evaluated and discussed with Marcello Yeung MD Christopher R Littlefield, DO  Family Medicine Resident, PGY-1   5/18/2024  8:04 AM

## 2024-05-18 NOTE — PROGRESS NOTES
Comprehensive Nutrition Assessment    Type and Reason for Visit:  Initial, Consult    Nutrition Recommendations/Plan:   Continue EN as ordered, advancing as tolerated.  Add Beto within 48 hours for wound healing.  Monitor TF tolerance.  Monitor weight healing/labs/weight.     Malnutrition Assessment:  Malnutrition Status:  Severe malnutrition (05/18/24 1537)    Context:  Acute Illness     Findings of the 6 clinical characteristics of malnutrition:  Energy Intake:  50% or less of estimated energy requirements for 5 or more days  Weight Loss:  Greater than 7.5% over 3 months     Body Fat Loss:  Unable to assess     Muscle Mass Loss:  Unable to assess    Fluid Accumulation:  No significant fluid accumulation     Strength:       Nutrition Assessment:    CBW of 99lbs from today, with admit wt last night recorded as 105lbs, again question accurracy based on such a large swing in 24 hours. Will use the 99lbs as baseline at this time. Based on the 99lbs his BMI is 14.62, whcih indicates underwt, and the extremely BMI along with recent significant wt loss indicates a dx of sevetubere malnutrition. He also was noted to be dehydrated when he came to the hospital, and had been NPO at his SNF d/t dysphagia. An NG tube has been placed with Jevity 1.5 @ 20ml/hr x 24 hours. This is providing 480ml, 31g protein and 365ml free water. His nutritional needs are assessed to be 1750kcal, 110g protein and 1750ml free water. Goal of TF is to be advanced to Jevity 1.5 @ 49ml/hr x 24 hours which will provide 1166ml, 1750kcal, 74g protein and 886ml free water. Will need additional 864ml free water in flushes and Beto BID for addtional protein for wound healing. Will discuss advancing as tolerated with MD within the next 48 hours.    Nutrition Related Findings:    Hgb/Hct low at 11.1/34.9, BUN elevated at 29 and Ca low at 8.1. Noted to fluctuate between constipation and diarrhea, no edema noted. Wound Type: Pressure Injury, Skin Tears    None Identified  Food/Nutrient Intake Outcomes: Enteral Nutrition Intake/Tolerance, Diet Advancement/Tolerance  Physical Signs/Symptoms Outcomes: Nutrition Focused Physical Findings, Skin, Weight, Chewing or Swallowing    Discharge Planning:    Too soon to determine     Shanda Powers RD  Contact: [phone]

## 2024-05-18 NOTE — CONSULTS
Comprehensive Nutrition Assessment    Type and Reason for Visit:  Initial, Consult    Nutrition Recommendations/Plan:   Continue with EN as ordered.  Advance EEN as tolerated to goal rate.  Add Beto within 48 hours.  Monitor TF tolerance.  Monitor weight/skin/labs.     Malnutrition Assessment:  Malnutrition Status:  Severe malnutrition (05/18/24 1537)    Context:  Acute Illness     Findings of the 6 clinical characteristics of malnutrition:  Energy Intake:  50% or less of estimated energy requirements for 5 or more days  Weight Loss:  Greater than 7.5% over 3 months     Body Fat Loss:  Unable to assess     Muscle Mass Loss:  Unable to assess    Fluid Accumulation:  No significant fluid accumulation     Strength:       Nutrition Assessment:    CBW of 99lbs from today, with admit wt last night recorded as 105lbs, again question accurracy based on such a large swing in 24 hours. Will use the 99lbs as baseline at this time. Based on the 99lbs his BMI is 14.62, whcih indicates underwt, and the extremely BMI along with recent significant wt loss indicates a dx of sevetubere malnutrition. He also was noted to be dehydrated when he came to the hospital, and had been NPO at his SNF d/t dysphagia. An NG tube has been placed with Jevity 1.5 @ 20ml/hr x 24 hours. This is providing 480ml, 31g protein and 365ml free water. His nutritional needs are assessed to be 1750kcal, 110g protein and 1750ml free water. Goal of TF is to be advanced to Jevity 1.5 @ 49ml/hr x 24 hours which will provide 1166ml, 1750kcal, 74g protein and 886ml free water. Will need additional 864ml free water in flushes and Beto BID for addtional protein for wound healing. Will discuss advancing as tolerated with MD within the next 48 hours.    Nutrition Related Findings:    Hgb/Hct low at 11.1/34.9, BUN elevated at 29 and Ca low at 8.1. Noted to fluctuate between constipation and diarrhea, no edema noted. Wound Type: Pressure Injury, Skin Tears    None Identified  Food/Nutrient Intake Outcomes: Enteral Nutrition Intake/Tolerance, Diet Advancement/Tolerance  Physical Signs/Symptoms Outcomes: Nutrition Focused Physical Findings, Skin, Weight, Chewing or Swallowing    Discharge Planning:    Too soon to determine     Shanda Powers RD  Contact: [phone]

## 2024-05-18 NOTE — PLAN OF CARE
Problem: Discharge Planning  Goal: Discharge to home or other facility with appropriate resources  5/18/2024 0055 by Delaney Vázquez RN  Outcome: Progressing  5/18/2024 0047 by Delaney Vázquez RN  Outcome: Progressing     Problem: Pain  Goal: Verbalizes/displays adequate comfort level or baseline comfort level  5/18/2024 0055 by Delaney Vázquez RN  Outcome: Progressing  5/18/2024 0047 by Delaney Vázquez RN  Outcome: Progressing     Problem: Safety - Adult  Goal: Free from fall injury  5/18/2024 0055 by Delaney Vázquez RN  Outcome: Progressing  5/18/2024 0047 by Delaney Vázquez RN  Outcome: Progressing

## 2024-05-18 NOTE — PROGRESS NOTES
Occupational Therapy  Facility/Department: [address]  Occupational Therapy Initial Assessment    Name: Suhas Leon  : 1925  MRN: [id number]  Date of Service: 2024  Chief Complaint   Patient presents with    Shortness of Breath     Sent from [facility]. Possible fluid in L lung     failed swallow eval     Per [facility] pt to have consult with Dr Mendenhall  for per tube placement         Discharge Recommendations:  Patient would benefit from continued therapy after discharge  OT Equipment Recommendations  Other: TBD       Patient Diagnosis(es): The encounter diagnosis was Aspiration pneumonia of right lower lobe, unspecified aspiration pneumonia type (HCC).  Past Medical History:  has a past medical history of Aspiration pneumonia of right lower lobe due to gastric secretions (HCC), BPH (benign prostatic hyperplasia), Cataracts, bilateral, COPD (chronic obstructive pulmonary disease) ([facility]), Former smoker, Hearing loss, History of intermittent claudication, Hyperlipidemia, Intestinal infection due to Clostridium difficile, OA (osteoarthritis) of knee, Pneumonia, Prediabetes, and Spinal stenosis.  Past Surgical History:  has a past surgical history that includes Coronary artery bypass graft (); Eye surgery (); knee surgery (Bilateral); Tonsillectomy (); and IR CHOLECYSTOSTOMY PERCUTANEOUS COMPLETE (2024).           Assessment   Performance deficits / Impairments: Decreased safe awareness;Decreased cognition;Decreased balance;Decreased ADL status;Decreased functional mobility ;Decreased strength;Decreased endurance  Assessment: Admit due to aspiration PNA. Pt from SNF. Has not been home since early April.Pt presents with decreased ADL status secondary to above noted deficits. Pt to benefit from continued therapy services while hospitalized to maximize pt's safety and independence in performing functional tasks. At this time, pt has not demonstrated the functional ability to safely return to  Car  Occupation: Retired  Type of Occupation: [employer]  Leisure & Hobbies: spend time with wife       Objective                Safety Devices  Type of Devices: All fall risk precautions in place;Bed alarm in place;Call light within reach;Chair alarm in place;Gait belt;Left in chair;Patient at risk for falls;Nurse notified  Restraints  Restraints Initially in Place: No  Balance  Sitting:  (static F(-), dynamic P, B UE support needed)  Standing:  (standing P, static, P(-) B UE support)     AROM: Generally decreased, functional  Strength: Generally decreased, functional (4-/5 prox to distal B  UE)  Tone: Normal  Sensation: Impaired (breakdown B Heels)  ADL  Feeding: NPO  Feeding Skilled Clinical Factors: NG Tube  Grooming: Maximum assistance  Grooming Skilled Clinical Factors: semi reclined  UE Bathing: Moderate assistance;Based on clinical judgement  LE Bathing: Maximum assistance;Based on clinical judgement  UE Dressing: Minimal assistance;Based on clinical judgement  LE Dressing: Maximum assistance;Dependent/Total;Based on clinical judgement  LE Dressing Skilled Clinical Factors: socks seated  Toileting: Dependent/Total  Toileting Skilled Clinical Factors: brief , pure wick  Functional Mobility: Maximum assistance  Functional Mobility Skilled Clinical Factors: 1 side step, B bedrails, max A with foot placement, stood less than 10 sec,  Skin Care: Bath wipes;Incontinent cleanser;Other (comment)        Bed mobility  Rolling to Left: Maximum assistance  Rolling to Right: Maximum assistance  Supine to Sit: Moderate assistance  Sit to Supine: Maximum assistance  Scooting: Maximal assistance  Bed Mobility Comments: HOB 30o, use of bedrail, slow yonatan effortful, needs max encouragement to attempt requested tasks  Transfers  Sit to stand: Maximum assistance  Stand to sit: Maximum assistance  Transfer Comments: B bedrails, knees flexed, increased time and effort, pt fearful  Vision  Vision: Impaired  Vision

## 2024-05-18 NOTE — CONSULTS
Comprehensive Nutrition Assessment    Type and Reason for Visit:  Initial, Consult    Nutrition Recommendations/Plan:   Continue EN as ordered, advancing as tolerated.  Add Beto within 48 hours for wound healing.  Monitor TF tolerance.  Monitor weight healing/labs/weight.     Malnutrition Assessment:  Malnutrition Status:  Severe malnutrition (05/18/24 1537)    Context:  Acute Illness     Findings of the 6 clinical characteristics of malnutrition:  Energy Intake:  50% or less of estimated energy requirements for 5 or more days  Weight Loss:  Greater than 7.5% over 3 months     Body Fat Loss:  Unable to assess     Muscle Mass Loss:  Unable to assess    Fluid Accumulation:  No significant fluid accumulation     Strength:       Nutrition Assessment:    CBW of 99lbs from today, with admit wt last night recorded as 105lbs, again question accurracy based on such a large swing in 24 hours. Will use the 99lbs as baseline at this time. Based on the 99lbs his BMI is 14.62, whcih indicates underwt, and the extremely BMI along with recent significant wt loss indicates a dx of sevetubere malnutrition. He also was noted to be dehydrated when he came to the hospital, and had been NPO at his SNF d/t dysphagia. An NG tube has been placed with Jevity 1.5 @ 20ml/hr x 24 hours. This is providing 480ml, 31g protein and 365ml free water. His nutritional needs are assessed to be 1750kcal, 110g protein and 1750ml free water. Goal of TF is to be advanced to Jevity 1.5 @ 49ml/hr x 24 hours which will provide 1166ml, 1750kcal, 74g protein and 886ml free water. Will need additional 864ml free water in flushes and Beto BID for addtional protein for wound healing. Will discuss advancing as tolerated with MD within the next 48 hours.    Nutrition Related Findings:    Hgb/Hct low at 11.1/34.9, BUN elevated at 29 and Ca low at 8.1. Noted to fluctuate between constipation and diarrhea, no edema noted. Wound Type: Pressure Injury, Skin Tears    rate, within 2 days       Nutrition Monitoring and Evaluation:   Behavioral-Environmental Outcomes: None Identified  Food/Nutrient Intake Outcomes: Enteral Nutrition Intake/Tolerance, Diet Advancement/Tolerance  Physical Signs/Symptoms Outcomes: Nutrition Focused Physical Findings, Skin, Weight, Chewing or Swallowing    Discharge Planning:    Too soon to determine     Shanda Powers RD  Contact: [phone]

## 2024-05-18 NOTE — PROGRESS NOTES
8-9/10 all over, has sacral wound and general deconditioning. He is pending PEG tube placement 5/20/24. He is not safe to return to his independent living apartment with his wife. He requires 24hr assist and would benefit from continued acute PT and PT at discharge.  Therapy Prognosis: Fair  Decision Making: Medium Complexity  Requires PT Follow-Up: Yes  Activity Tolerance  Activity Tolerance: Patient limited by pain;Patient limited by fatigue;Patient limited by endurance     Plan   Physical Therapy Plan  General Plan: 3-5 times per week  Current Treatment Recommendations: Strengthening, ROM, Balance training, Gait training, Wheelchair mobility training, Endurance training, Transfer training, Functional mobility training, Home exercise program, Safety education & training, Pain management, Therapeutic activities  Safety Devices  Type of Devices: All fall risk precautions in place, Bed alarm in place, Call light within reach, Gait belt, Patient at risk for falls, Nurse notified, Heels elevated for pressure relief, Left in bed  Restraints  Restraints Initially in Place: No     Restrictions  Restrictions/Precautions  Restrictions/Precautions: NPO, Fall Risk, Bed Alarm (NG tube, R side percutaneous drain, pure wick)  Required Braces or Orthoses?: No  Implants present? : Metal implants (L TKA)  Position Activity Restriction  Other position/activity restrictions: up with assist; HOB 30-45degrees with tube feeding     Subjective   General  Patient assessed for rehabilitation services?: Yes  Response To Previous Treatment: Not applicable  Family / Caregiver Present: No  General Comment  Comments: CXR: 1. Stable moderate right pleural effusion and right basilar airspace opacity  that could represent atelectasis or pneumonia.  2. The small left pleural effusion is no longer identified and left basilar  opacities are significantly improved.  These are favored to represent  atelectasis.  Pending PEG tube placement 5/20/24 due  to failed swallow study.  Subjective  Subjective: RN & pt agreeable to PT. Pt supine in bed with HOB elevated upon arrival. Pt report pain \"all over\" and rate 8-9 of 10. Positioned for comfort in R sidelying with waffle cushion under buttocks and pillows/wedge for support. RN notified. Pt states he told his wife that he can't do it anymore but she said he can.         Social/Functional History  Social/Functional History  Lives With: Spouse  Type of Home: Apartment ([place])  Home Layout: One level  Home Access: Elevator  Bathroom Shower/Tub: Walk-in shower  Bathroom Toilet: Standard  Bathroom Equipment: Grab bars in shower, Grab bars around toilet  Bathroom Accessibility: Accessible  Home Equipment: Cane, Walker - Rolling  Has the patient had two or more falls in the past year or any fall with injury in the past year?: Yes (3)  Receives Help From: Family  ADL Assistance: Independent  Homemaking Responsibilities: No (wife does household chores)  Ambulation Assistance: Independent  Transfer Assistance: Independent  Active : Yes  Mode of Transportation: Car  Occupation: Retired  Type of Occupation: [employer]  Leisure & Hobbies: spend time with wife  Additional Comments: [facility] prior to this admit  Vision/Hearing  Vision  Vision: Impaired  Vision Exceptions: Wears glasses at all times  Hearing  Hearing: Exceptions to WFL  Hearing Exceptions: Bilateral hearing aid;Hard of hearing/hearing concerns    Cognition   Orientation  Orientation Level: Oriented to place;Oriented to situation;Oriented to time (able to state name and  and age)  Cognition  Overall Cognitive Status: Exceptions  Following Commands: Impaired;Follows one step commands with repetition;Follows one step commands with increased time;Follows multistep commands with increased time;Follows multistep commands with repitition  Attention Span: Impaired  Memory: Appears intact  Safety Judgement: Impaired;Decreased awareness of need for

## 2024-05-19 ENCOUNTER — APPOINTMENT (OUTPATIENT)
Dept: GENERAL RADIOLOGY | Age: 89
DRG: 177 | End: 2024-05-19
Payer: COMMERCIAL
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
## 2024-05-19 LAB
ALBUMIN SERPL-MCNC: 2.4 G/DL (ref 3.5–5.2)
ALBUMIN/GLOB SERPL: 0.6 {RATIO} (ref 1–2.5)
ALP SERPL-CCNC: 94 U/L (ref 40–129)
ALT SERPL-CCNC: 18 U/L (ref 5–41)
ANION GAP SERPL CALCULATED.3IONS-SCNC: 7 MMOL/L (ref 9–17)
AST SERPL-CCNC: 18 U/L
BASOPHILS # BLD: 0 K/UL (ref 0–0.2)
BASOPHILS NFR BLD: 0 % (ref 0–2)
BILIRUB SERPL-MCNC: 0.3 MG/DL (ref 0.3–1.2)
BUN SERPL-MCNC: 25 MG/DL (ref 8–23)
CALCIUM SERPL-MCNC: 8.1 MG/DL (ref 8.6–10.4)
CHLORIDE SERPL-SCNC: 112 MMOL/L (ref 98–107)
CO2 SERPL-SCNC: 26 MMOL/L (ref 20–31)
CREAT SERPL-MCNC: 0.9 MG/DL (ref 0.7–1.2)
EOSINOPHIL # BLD: 0.1 K/UL (ref 0–0.4)
EOSINOPHILS RELATIVE PERCENT: 1 % (ref 1–4)
ERYTHROCYTE [DISTWIDTH] IN BLOOD BY AUTOMATED COUNT: 15.9 % (ref 12.5–15.4)
GFR, ESTIMATED: 77 ML/MIN/1.73M2
GLUCOSE BLD-MCNC: 106 MG/DL (ref 75–110)
GLUCOSE BLD-MCNC: 107 MG/DL (ref 75–110)
GLUCOSE BLD-MCNC: 110 MG/DL (ref 75–110)
GLUCOSE BLD-MCNC: 119 MG/DL (ref 75–110)
GLUCOSE BLD-MCNC: 124 MG/DL (ref 75–110)
GLUCOSE BLD-MCNC: 124 MG/DL (ref 75–110)
GLUCOSE SERPL-MCNC: 127 MG/DL (ref 70–99)
HCT VFR BLD AUTO: 34.4 % (ref 41–53)
HGB BLD-MCNC: 11.4 G/DL (ref 13.5–17.5)
LYMPHOCYTES NFR BLD: 1.4 K/UL (ref 1–4.8)
LYMPHOCYTES RELATIVE PERCENT: 10 % (ref 24–44)
MAGNESIUM SERPL-MCNC: 2.1 MG/DL (ref 1.6–2.6)
MCH RBC QN AUTO: 32 PG (ref 26–34)
MCHC RBC AUTO-ENTMCNC: 33.2 G/DL (ref 31–37)
MCV RBC AUTO: 96.5 FL (ref 80–100)
MONOCYTES NFR BLD: 0.9 K/UL (ref 0.1–1.2)
MONOCYTES NFR BLD: 6 % (ref 2–11)
NEUTROPHILS NFR BLD: 83 % (ref 36–66)
NEUTS SEG NFR BLD: 11.3 K/UL (ref 1.8–7.7)
PHOSPHATE SERPL-MCNC: 2.8 MG/DL (ref 2.5–4.5)
PLATELET # BLD AUTO: 230 K/UL (ref 140–450)
PMV BLD AUTO: 8.7 FL (ref 6–12)
POTASSIUM SERPL-SCNC: 3.8 MMOL/L (ref 3.7–5.3)
PROT SERPL-MCNC: 6.2 G/DL (ref 6.4–8.3)
RBC # BLD AUTO: 3.57 M/UL (ref 4.5–5.9)
SODIUM SERPL-SCNC: 145 MMOL/L (ref 135–144)
WBC OTHER # BLD: 13.7 K/UL (ref 3.5–11)

## 2024-05-19 PROCEDURE — A4216 STERILE WATER/SALINE, 10 ML: HCPCS

## 2024-05-19 PROCEDURE — 6370000000 HC RX 637 (ALT 250 FOR IP)

## 2024-05-19 PROCEDURE — 36415 COLL VENOUS BLD VENIPUNCTURE: CPT

## 2024-05-19 PROCEDURE — 84100 ASSAY OF PHOSPHORUS: CPT

## 2024-05-19 PROCEDURE — 71045 X-RAY EXAM CHEST 1 VIEW: CPT

## 2024-05-19 PROCEDURE — 6370000000 HC RX 637 (ALT 250 FOR IP): Performed by: PHYSICIAN ASSISTANT

## 2024-05-19 PROCEDURE — 80053 COMPREHEN METABOLIC PANEL: CPT

## 2024-05-19 PROCEDURE — 2580000003 HC RX 258

## 2024-05-19 PROCEDURE — 6360000002 HC RX W HCPCS

## 2024-05-19 PROCEDURE — C9113 INJ PANTOPRAZOLE SODIUM, VIA: HCPCS

## 2024-05-19 PROCEDURE — 85025 COMPLETE CBC W/AUTO DIFF WBC: CPT

## 2024-05-19 PROCEDURE — 2060000000 HC ICU INTERMEDIATE R&B

## 2024-05-19 PROCEDURE — 83735 ASSAY OF MAGNESIUM: CPT

## 2024-05-19 PROCEDURE — 82947 ASSAY GLUCOSE BLOOD QUANT: CPT

## 2024-05-19 RX ORDER — MAGNESIUM SULFATE IN WATER 40 MG/ML
2000 INJECTION, SOLUTION INTRAVENOUS PRN
Status: DISCONTINUED | OUTPATIENT
Start: 2024-05-19 | End: 2024-05-19

## 2024-05-19 RX ORDER — POTASSIUM CHLORIDE 20 MEQ/1
40 TABLET, EXTENDED RELEASE ORAL PRN
Status: DISCONTINUED | OUTPATIENT
Start: 2024-05-19 | End: 2024-05-19

## 2024-05-19 RX ORDER — LISINOPRIL 10 MG/1
10 TABLET ORAL ONCE
Status: COMPLETED | OUTPATIENT
Start: 2024-05-19 | End: 2024-05-19

## 2024-05-19 RX ORDER — LISINOPRIL 10 MG/1
10 TABLET ORAL DAILY
Status: DISCONTINUED | OUTPATIENT
Start: 2024-05-20 | End: 2024-05-20

## 2024-05-19 RX ORDER — HEPARIN SODIUM 5000 [USP'U]/ML
5000 INJECTION, SOLUTION INTRAVENOUS; SUBCUTANEOUS 2 TIMES DAILY
Status: DISCONTINUED | OUTPATIENT
Start: 2024-05-19 | End: 2024-05-20 | Stop reason: HOSPADM

## 2024-05-19 RX ORDER — POTASSIUM CHLORIDE 7.45 MG/ML
10 INJECTION INTRAVENOUS PRN
Status: DISCONTINUED | OUTPATIENT
Start: 2024-05-19 | End: 2024-05-19

## 2024-05-19 RX ADMIN — METOPROLOL SUCCINATE 12.5 MG: 25 TABLET, EXTENDED RELEASE ORAL at 21:59

## 2024-05-19 RX ADMIN — SODIUM CHLORIDE, PRESERVATIVE FREE 40 MG: 5 INJECTION INTRAVENOUS at 08:21

## 2024-05-19 RX ADMIN — ACETAMINOPHEN 650 MG: 325 TABLET ORAL at 12:07

## 2024-05-19 RX ADMIN — Medication 3 MG: at 21:59

## 2024-05-19 RX ADMIN — AMIODARONE HYDROCHLORIDE 200 MG: 200 TABLET ORAL at 08:21

## 2024-05-19 RX ADMIN — LISINOPRIL 10 MG: 10 TABLET ORAL at 17:29

## 2024-05-19 RX ADMIN — LISINOPRIL 2.5 MG: 5 TABLET ORAL at 08:21

## 2024-05-19 RX ADMIN — POLYETHYLENE GLYCOL 3350 17 G: 17 POWDER, FOR SOLUTION ORAL at 08:21

## 2024-05-19 RX ADMIN — SODIUM CHLORIDE: 9 INJECTION, SOLUTION INTRAVENOUS at 15:40

## 2024-05-19 RX ADMIN — SPIRONOLACTONE 12.5 MG: 25 TABLET, FILM COATED ORAL at 08:22

## 2024-05-19 RX ADMIN — SODIUM CHLORIDE, PRESERVATIVE FREE 10 ML: 5 INJECTION INTRAVENOUS at 08:25

## 2024-05-19 RX ADMIN — HEPARIN SODIUM 5000 UNITS: 5000 INJECTION INTRAVENOUS; SUBCUTANEOUS at 21:58

## 2024-05-19 RX ADMIN — HEPARIN SODIUM 5000 UNITS: 5000 INJECTION INTRAVENOUS; SUBCUTANEOUS at 08:21

## 2024-05-19 RX ADMIN — FUROSEMIDE 20 MG: 20 TABLET ORAL at 08:22

## 2024-05-19 RX ADMIN — ACETAMINOPHEN 650 MG: 325 TABLET ORAL at 17:29

## 2024-05-19 RX ADMIN — ACETAMINOPHEN 650 MG: 325 TABLET ORAL at 01:59

## 2024-05-19 ASSESSMENT — PAIN DESCRIPTION - LOCATION
LOCATION: GENERALIZED
LOCATION: GENERALIZED

## 2024-05-19 ASSESSMENT — PAIN SCALES - GENERAL
PAINLEVEL_OUTOF10: 3
PAINLEVEL_OUTOF10: 0
PAINLEVEL_OUTOF10: 3
PAINLEVEL_OUTOF10: 2
PAINLEVEL_OUTOF10: 0
PAINLEVEL_OUTOF10: 6

## 2024-05-19 ASSESSMENT — PAIN - FUNCTIONAL ASSESSMENT
PAIN_FUNCTIONAL_ASSESSMENT: ACTIVITIES ARE NOT PREVENTED
PAIN_FUNCTIONAL_ASSESSMENT: ACTIVITIES ARE NOT PREVENTED

## 2024-05-19 ASSESSMENT — PAIN DESCRIPTION - DESCRIPTORS
DESCRIPTORS: DISCOMFORT
DESCRIPTORS: DISCOMFORT

## 2024-05-19 ASSESSMENT — PAIN DESCRIPTION - ORIENTATION
ORIENTATION: MID
ORIENTATION: MID

## 2024-05-19 ASSESSMENT — PAIN DESCRIPTION - FREQUENCY: FREQUENCY: INTERMITTENT

## 2024-05-19 ASSESSMENT — PAIN DESCRIPTION - ONSET: ONSET: PROGRESSIVE

## 2024-05-19 ASSESSMENT — PAIN DESCRIPTION - PAIN TYPE: TYPE: CHRONIC PAIN

## 2024-05-19 NOTE — PROGRESS NOTES
PHARMACY NOTE:    The electrolyte replacement protocol for potassium/magnesium has been discontinued per P&T guidelines because the patient has reduced renal function (CrCl < 30 mL/min).      The patient's most recent potassium & magnesium levels are:  Recent Labs     05/17/24  1420 05/18/24  0822 05/19/24  0608   K 3.7 4.1 3.8   MG  --  2.1 2.1     Estimated Creatinine Clearance: 29 mL/min (based on SCr of 0.9 mg/dL).    For patients with decreased renal function (below 30ml/min) needing potassium/magnesium supplementation, please order individual bolus doses with appropriate monitoring.      Please contact the inpatient pharmacy with any concerns.  Thank you.    Thaddeus Barger, Pharm.D., FATMATA, BCCCP  5/19/2024 7:21 PM

## 2024-05-19 NOTE — ACP (ADVANCE CARE PLANNING)
Advance Care Planning     General Advance Care Planning (ACP) Conversation    Date of Conversation: 5/17/2024  Conducted with: Patient with Decision Making Capacity and Legal next of kin  Other persons present: None  Reviewed on phone    Healthcare Decision Maker:    Primary Decision Maker: Ashley Leon - [facility] - [phone]  Click here to complete Healthcare Decision Makers including selection of the Healthcare Decision Maker Relationship (ie \"Primary\").      Content/Action Overview:  Has ACP document(s) on file - reflects the patient's care preferences  Reviewed DNR/DNI and patient confirms current DNR status - completed forms on file (place new order if needed)        Length of Voluntary ACP Conversation in minutes:  <16 minutes (Non-Billable)    SARITHA BARAJAS CARO

## 2024-05-19 NOTE — PROGRESS NOTES
[facility]  Inpatient Service     Progress Note  5/18/2024    8:04 AM    Name:   Suhas Leon  MRN:     [id number]     Acct:      [id number]   Room:   323/323-01   Day:  1  Admit Date:  5/17/2024 12:16 PM    PCP:   Marcello Elise MD  Code Status:  DNR-CCA    Subjective:     Overnight events: None    Pt was examined at bedside.  He denies shortness of breath, but says that he has a cough. Denies chest pain, palpations, and feels his energy has improved. He is looking forward to his PEG tube placement for tomorrow. He has no other complaints or concerns this morning.      Medications:     Allergies:  No Known Allergies    Current Meds:   Scheduled Meds:    acetaminophen  650 mg Per NG tube 4 times per day    Or    acetaminophen  650 mg Rectal 4 times per day    sodium chloride flush  5-40 mL IntraVENous 2 times per day    melatonin  3 mg Oral Nightly    polyethylene glycol  17 g Oral Daily    amiodarone  200 mg Oral Daily    [Held by provider] finasteride  5 mg Oral Daily    fluticasone  1 spray Each Nostril Daily    furosemide  20 mg Oral Daily    lisinopril  2.5 mg Oral Daily    metoprolol succinate  12.5 mg Oral Nightly    spironolactone  12.5 mg Oral Daily    [Held by provider] empagliflozin  10 mg Oral Daily    heparin (porcine)  5,000 Units SubCUTAneous BID    pantoprazole (PROTONIX) 40 mg in sodium chloride (PF) 0.9 % 10 mL injection  40 mg IntraVENous Daily     Continuous Infusions:    sodium chloride      sodium chloride 90 mL/hr at 05/18/24 0003    dextrose       PRN Meds: sodium chloride flush, sodium chloride, ondansetron **OR** ondansetron, senna, sodium chloride, glucose, dextrose bolus **OR** dextrose bolus, glucagon (rDNA), dextrose    ROS:   ROS is negative except for points noted above.    Data:     Vitals:  BP (!) 142/62   Pulse 61   Temp 97.5 °F (36.4 °C) (Oral)   Resp 16   Ht 1.753 m (5' 9\")   Wt 44.9 kg (98 lb 15.8 oz)   SpO2  pulses.      Heart sounds: Normal heart sounds. No murmur heard.  Pulmonary:      Effort: not in respiratory distress, but on 4L oxygen nc     Breath sounds: Upper airway sounds, most notable from right middle lobe, absent in posterior lung fields..   Chest:      Chest wall: No tenderness.   Musculoskeletal:      Right lower leg: No edema.      Left lower leg: No edema.   Skin: Sacral pressure ulcer see photos.  Neurological:      Mental Status: He is alert.   Psychiatric:         Mood and Affect: Mood normal.         Behavior: Behavior normal.     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) 5/17/2024 Yes    Pressure ulcer of sacral region, unspecified stage 5/18/2024 Yes    Benign prostatic hyperplasia with incomplete bladder emptying 5/17/2024 Yes    Former smoker 5/17/2024 Yes    Chronic kidney disease, stage 2 (mild) 5/17/2024 Yes    Spinal stenosis 5/17/2024 Yes    Coronary artery disease involving coronary bypass graft of native heart 5/17/2024 Yes    Bilateral pleural effusion 5/18/2024 Yes    Severe protein-calorie malnutrition (HCC) 5/17/2024 Yes    Overview Signed 4/24/2024  4:27 PM by Merced Askew MD CANS of 7 (weight loss, muscle wasting, GI sx, physical activity, metabolic stress, albumin, and BMI)         Other dysphagia 5/17/2024 Yes    Generalized weakness 5/17/2024 Yes    Cachexia (HCC) 5/17/2024 Yes    Other emphysema (HCC) (Chronic) 5/17/2024 Yes    QT prolongation 5/17/2024 Yes    Overview Signed 5/17/2024  5:11 PM by Ruddy Jalloh,      Qtc  460 on admission         Cholecystostomy care (HCC) 5/18/2024 Yes        Plan:     Aspiration pneumonitis of gastric

## 2024-05-19 NOTE — CARE COORDINATION
Case Management Assessment  Initial Evaluation    Date/Time of Evaluation: 5/19/2024 12:24 PM  Assessment Completed by: SARITHA BARAJAS CARO    If patient is discharged prior to next notation, then this note serves as note for discharge by case management.    Patient Name: Suhas Leon                   YOB: 1925  Diagnosis: Aspiration pneumonia of right lower lobe, unspecified aspiration pneumonia type (HCC) [J69.0]  Aspiration pneumonia due to anesthesia during labor and delivery, unspecified laterality, unspecified part of lung [O74.0]                   Date / Time: 5/17/2024 12:16 PM    Patient Admission Status: Inpatient   Readmission Risk (Low < 19, Mod (19-27), High > 27): Readmission Risk Score: 23.2    Current PCP: Marcello Elise MD  PCP verified by CM? Yes    Chart Reviewed: Yes      History Provided by: Spouse  Patient Orientation: Alert and Oriented    Patient Cognition: Alert    Hospitalization in the last 30 days (Readmission):  Yes    If yes, Readmission Assessment in  Navigator will be completed.    Advance Directives:      Code Status: DNR-CCA   Patient's Primary Decision Maker is: Legal Next of Kin    Primary Decision Maker: Ashley Leon - Spouse - [phone]    Discharge Planning:    Patient lives with: Spouse/Significant Other Type of Home: House  Primary Care Giver: Other (Comment) ([facility])  Patient Support Systems include: Spouse/Significant Other   Current Financial resources: Medicare  Current community resources: None  Current services prior to admission: Skilled Nursing Facility ([facility])            Current DME: Cane, Walker, Shower Chair            Type of Home Care services:  OT, PT, Nursing Services    ADLS  Prior functional level: Assistance with the following:, Bathing, Dressing, Mobility  Current functional level: Mobility, Toileting, Dressing, Assistance with the following:, Bathing    PT AM-PAC: 10 /24  OT AM-PAC: 13 /24    Family can  pneumonia of right lower lobe, unspecified aspiration pneumonia type (HCC) [J69.0]  Aspiration pneumonia due to anesthesia during labor and delivery, unspecified laterality, unspecified part of lung [O74.0]    IF APPLICABLE: The Patient and/or patient representative Suhas and his family were provided with a choice of provider and agrees with the discharge plan. Freedom of choice list with basic dialogue that supports the patient's individualized plan of care/goals and shares the quality data associated with the providers was provided to:     Patient Representative Name:       The Patient and/or Patient Representative Agree with the Discharge Plan?      SARITHA BARAJAS CARO  Case Management Department  Ph: [phone] Fax:

## 2024-05-19 NOTE — PROGRESS NOTES
SPIRITUAL CARE DEPARTMENT [facility]  PROGRESS NOTE    Room # 323/323-01   Name: Suhas Leon            Cheondoism: presbyterian    Reason for visit: Routine    I visited the patient.    Admit Date & Time: 5/17/2024 12:16 PM    Assessment:  Suhas Leon is a 89 y.o. male in the hospital because no active principal problem. Upon entering the room Pt. Was friendly and open to conversation. Pt talked about wife and family.      Intervention:  I introduced myself and my title as  I offered space for Pt  to express feelings, needs, and concerns and provided a ministry presence.  provided support and care to Pt. Active listening and presence were provided also.     Outcome:  Pt expressed need to have assistance for his wife during the day especially while he is in hospital    Plan:  Chaplains will remain available to offer spiritual and emotional support as needed.    Electronically signed by Chaplain Selin, on 5/19/2024 at 5:11 PM.  Spiritual Care Department  [facility]      05/19/24 1707   Encounter Summary   Encounter Overview/Reason Initial Encounter;Spiritual/Emotional Needs   Service Provided For Patient   Support System Spouse;Family members   Last Encounter  05/19/24   Complexity of Encounter High   Begin Time 1455   End Time  1510   Total Time Calculated 15 min   Crisis   Type Emotional distress   Spiritual/Emotional needs   Type Spiritual Support;Emotional Distress   Grief, Loss, and Adjustments   Type Life Adjustments   Assessment/Intervention/Outcome   Assessment Calm;Coping   Intervention Active listening   Outcome Comfort;Coping;Encouraged   Plan and Referrals   Plan/Referrals Continue to visit, (comment)

## 2024-05-19 NOTE — PLAN OF CARE
Problem: Discharge Planning  Goal: Discharge to home or other facility with appropriate resources  5/19/2024 0737 by Amita Richey RN  Outcome: Progressing  5/19/2024 0324 by Yajaira Carolina RN  Outcome: Progressing     Problem: Pain  Goal: Verbalizes/displays adequate comfort level or baseline comfort level  5/19/2024 0737 by Amita Richey RN  Outcome: Progressing  5/19/2024 0324 by Yajaira Carolina RN  Outcome: Progressing     Problem: Skin/Tissue Integrity  Goal: Absence of new skin breakdown  Description: 1.  Monitor for areas of redness and/or skin breakdown  2.  Assess vascular access sites hourly  3.  Every 4-6 hours minimum:  Change oxygen saturation probe site  4.  Every 4-6 hours:  If on nasal continuous positive airway pressure, respiratory therapy assess nares and determine need for appliance change or resting period.  5/19/2024 0737 by Amita Richey RN  Outcome: Progressing  5/19/2024 0324 by Yajaira Carolina RN  Outcome: Progressing     Problem: Safety - Adult  Goal: Free from fall injury  5/19/2024 0737 by Amita Richey RN  Outcome: Progressing  5/19/2024 0324 by Yajaira Carolina RN  Outcome: Progressing     Problem: ABCDS Injury Assessment  Goal: Absence of physical injury  5/19/2024 0737 by Amita Richey RN  Outcome: Progressing  5/19/2024 0324 by Yajaira Carolina RN  Outcome: Progressing     Problem: Nutrition Deficit:  Goal: Optimize nutritional status  5/19/2024 0737 by Amita Richey RN  Outcome: Progressing  5/19/2024 0324 by Yajaira Carolina RN  Outcome: Progressing

## 2024-05-19 NOTE — CONSULTS
[facility] Jose Gu[facility]upe Garcia M.D., M.H.A.  Migel Shi M.D., M.B.A.  UZIEL Toscano P.A.C.    [facility]  [address]    Cardio-Oncology Service  [address], [address]  [place] [address]   [address] [place] [address]  Phone: [phone]   [place] [address]   Phone: [phone]  Fax: [phone]   [phone]  Fax: [phone]    Name:   Suhas Leon :  1925   MRN:   [id number] Gender:   male   PCP:  Marcello Elise MD Age: 89 y.o.   PCP Fax:  [phone]     Hospital:          [facility] Encounter Date:     24        Reason for Consult: Pre Op Risk Stratification    HPI: Suhas Leon is an 89 y.o. male cardiology has been consulted on for preop restratification before PEG tube placement.    Notes from hospitalist were reviewed per  Patient in hospital for aspiration pneumonitis with gastric secretions.  Has known history of severe protein calorie malnutrition, generalized weakness and cachexia.  This admission has had elevated hide sensitive troponin 36 and 32 which was similar to previous admissions.  Has known history of congestive heart failure with preserved ejection fraction, coronary artery disease and QT prolongation.    He follows with [facility] physicians cardiology, last seen roughly 1 year ago.  He does have known history of coronary artery disease status post CABG x 4 in .  Known history of nonsustained V. tach on amiodarone 200 mg daily.  History of hypertension, hyperlipidemia.  Known history of COPD.  He did have an echocardiogram in 2023 with an ejection fraction of 20 to 25%, no significant valvular abnormalities.    Spoke with patient today in his room.  Denies chest pain, chest tightness or palpitations.  Denies lower extremity.  No lightheaded/dizziness//vomiting or headaches.  He has no  [P.O.:360]  Out: -         PHYSICAL EXAM:      General: WDWN in NAD. A and O x 3  HEENT: EOMI, Sclera anicteric, Conjunctiva pink  Neck: Supple, No JVP or HJR, No carotid bruits  Lungs: Clear to A & P  Heart: RRR  Abdomen: soft non tender, good bowel sounds, No HSM  Ext: no edema,  Skin: no discoloration  Vascular: distal pulses intact        CURRENT MEDICATIONS:      Scheduled Medications    metoprolol succinate  12.5 mg Oral Daily    empagliflozin  10 mg Oral Daily    spironolactone  25 mg Oral Daily    [Held by provider] furosemide  20 mg Oral BID    furosemide  20 mg IntraVENous Daily    sodium chloride flush  5-40 mL IntraVENous 2 times per day    melatonin  3 mg Oral Nightly    cefTRIAXone (ROCEPHIN) IV  1,000 mg IntraVENous Q24H    azithromycin  500 mg IntraVENous Q24H    fluticasone  1 spray Each Nostril Daily    amiodarone  200 mg Oral Daily    finasteride  5 mg Oral Daily            LABS & TESTING:         CBC:       Recent Labs     04/21/24  0840   WBC 8.5   RBC 3.11*   HGB 10.2*   HCT 29.8*   RDW 16.4*      MPV 8.7      BMP:       Recent Labs     04/21/24  0840      K 3.7      CO2 30   BUN 20   CREATININE 1.3*   GLUCOSE 95   CALCIUM 7.6*      Magnesium:          Recent Labs     04/19/24  1430   MG 2.5         EKG:        Encounter Date: 04/19/24   EKG 12 Lead   Result Value     Ventricular Rate 60     Atrial Rate 60     P-R Interval 206     QRS Duration 120     Q-T Interval 566     QTc Calculation (Bazett) 566     P Axis 61     R Axis 62     T Axis 105     Narrative     Normal sinus rhythm  Left ventricular hypertrophy with QRS widening and repolarization abnormality ( Olaton product )  Abnormal ECG  When compared with ECG of 28-DEC-2023 17:31,  ST elevation now present in Anterior leads  T wave inversion now evident in Anterior leads  QT has lengthened         ECHO: 11/21/23  ECHO (TTE) COMPLETE (PRN CONTRAST/BUBBLE/STRAIN/3D) 11/22/2023  4:13 PM (Final)  Interpretation Summary

## 2024-05-20 VITALS
OXYGEN SATURATION: 95 % | WEIGHT: 98.99 LBS | DIASTOLIC BLOOD PRESSURE: 54 MMHG | RESPIRATION RATE: 16 BRPM | TEMPERATURE: 97.5 F | BODY MASS INDEX: 14.66 KG/M2 | SYSTOLIC BLOOD PRESSURE: 128 MMHG | HEART RATE: 62 BPM | HEIGHT: 69 IN

## 2024-05-20 LAB
ALBUMIN SERPL-MCNC: 2.3 G/DL (ref 3.5–5.2)
ALBUMIN/GLOB SERPL: 0.6 {RATIO} (ref 1–2.5)
ALP SERPL-CCNC: 103 U/L (ref 40–129)
ALT SERPL-CCNC: 16 U/L (ref 5–41)
ANION GAP SERPL CALCULATED.3IONS-SCNC: 6 MMOL/L (ref 9–17)
AST SERPL-CCNC: 13 U/L
BASOPHILS # BLD: 0.1 K/UL (ref 0–0.2)
BASOPHILS NFR BLD: 1 % (ref 0–2)
BILIRUB SERPL-MCNC: 0.3 MG/DL (ref 0.3–1.2)
BUN SERPL-MCNC: 22 MG/DL (ref 8–23)
CALCIUM SERPL-MCNC: 8 MG/DL (ref 8.6–10.4)
CHLORIDE SERPL-SCNC: 115 MMOL/L (ref 98–107)
CO2 SERPL-SCNC: 26 MMOL/L (ref 20–31)
CREAT SERPL-MCNC: 0.8 MG/DL (ref 0.7–1.2)
EOSINOPHIL # BLD: 0.1 K/UL (ref 0–0.4)
EOSINOPHILS RELATIVE PERCENT: 1 % (ref 1–4)
ERYTHROCYTE [DISTWIDTH] IN BLOOD BY AUTOMATED COUNT: 16.2 % (ref 12.5–15.4)
GFR, ESTIMATED: 80 ML/MIN/1.73M2
GLUCOSE BLD-MCNC: 103 MG/DL (ref 75–110)
GLUCOSE BLD-MCNC: 115 MG/DL (ref 75–110)
GLUCOSE BLD-MCNC: 128 MG/DL (ref 75–110)
GLUCOSE BLD-MCNC: 133 MG/DL (ref 75–110)
GLUCOSE SERPL-MCNC: 137 MG/DL (ref 70–99)
HCT VFR BLD AUTO: 33.3 % (ref 41–53)
HGB BLD-MCNC: 11 G/DL (ref 13.5–17.5)
LYMPHOCYTES NFR BLD: 1.4 K/UL (ref 1–4.8)
LYMPHOCYTES RELATIVE PERCENT: 9 % (ref 24–44)
MAGNESIUM SERPL-MCNC: 2.1 MG/DL (ref 1.6–2.6)
MCH RBC QN AUTO: 32 PG (ref 26–34)
MCHC RBC AUTO-ENTMCNC: 33.1 G/DL (ref 31–37)
MCV RBC AUTO: 96.6 FL (ref 80–100)
MONOCYTES NFR BLD: 0.8 K/UL (ref 0.1–1.2)
MONOCYTES NFR BLD: 5 % (ref 2–11)
NEUTROPHILS NFR BLD: 84 % (ref 36–66)
NEUTS SEG NFR BLD: 13 K/UL (ref 1.8–7.7)
PHOSPHATE SERPL-MCNC: 2.9 MG/DL (ref 2.5–4.5)
PLATELET # BLD AUTO: 249 K/UL (ref 140–450)
PMV BLD AUTO: 8.3 FL (ref 6–12)
POTASSIUM SERPL-SCNC: 4 MMOL/L (ref 3.7–5.3)
PROCALCITONIN SERPL-MCNC: 0.2 NG/ML (ref 0–0.09)
PROT SERPL-MCNC: 6.2 G/DL (ref 6.4–8.3)
RBC # BLD AUTO: 3.44 M/UL (ref 4.5–5.9)
SODIUM SERPL-SCNC: 147 MMOL/L (ref 135–144)
WBC OTHER # BLD: 15.3 K/UL (ref 3.5–11)

## 2024-05-20 PROCEDURE — 87070 CULTURE OTHR SPECIMN AEROBIC: CPT

## 2024-05-20 PROCEDURE — 6370000000 HC RX 637 (ALT 250 FOR IP)

## 2024-05-20 PROCEDURE — 2580000003 HC RX 258

## 2024-05-20 PROCEDURE — 6360000002 HC RX W HCPCS

## 2024-05-20 PROCEDURE — 85025 COMPLETE CBC W/AUTO DIFF WBC: CPT

## 2024-05-20 PROCEDURE — 87205 SMEAR GRAM STAIN: CPT

## 2024-05-20 PROCEDURE — 36415 COLL VENOUS BLD VENIPUNCTURE: CPT

## 2024-05-20 PROCEDURE — 99232 SBSQ HOSP IP/OBS MODERATE 35: CPT | Performed by: FAMILY MEDICINE

## 2024-05-20 PROCEDURE — 83735 ASSAY OF MAGNESIUM: CPT

## 2024-05-20 PROCEDURE — 99213 OFFICE O/P EST LOW 20 MIN: CPT

## 2024-05-20 PROCEDURE — 84100 ASSAY OF PHOSPHORUS: CPT

## 2024-05-20 PROCEDURE — 6370000000 HC RX 637 (ALT 250 FOR IP): Performed by: PHYSICIAN ASSISTANT

## 2024-05-20 PROCEDURE — 84145 PROCALCITONIN (PCT): CPT

## 2024-05-20 PROCEDURE — A4216 STERILE WATER/SALINE, 10 ML: HCPCS

## 2024-05-20 PROCEDURE — 87075 CULTR BACTERIA EXCEPT BLOOD: CPT

## 2024-05-20 PROCEDURE — C9113 INJ PANTOPRAZOLE SODIUM, VIA: HCPCS

## 2024-05-20 PROCEDURE — 80053 COMPREHEN METABOLIC PANEL: CPT

## 2024-05-20 PROCEDURE — 82947 ASSAY GLUCOSE BLOOD QUANT: CPT

## 2024-05-20 PROCEDURE — 86403 PARTICLE AGGLUT ANTBDY SCRN: CPT

## 2024-05-20 RX ORDER — LISINOPRIL 10 MG/1
10 TABLET ORAL 2 TIMES DAILY
Status: DISCONTINUED | OUTPATIENT
Start: 2024-05-20 | End: 2024-05-20 | Stop reason: HOSPADM

## 2024-05-20 RX ORDER — KETOROLAC TROMETHAMINE 15 MG/ML
15 INJECTION, SOLUTION INTRAMUSCULAR; INTRAVENOUS ONCE
Status: COMPLETED | OUTPATIENT
Start: 2024-05-20 | End: 2024-05-20

## 2024-05-20 RX ADMIN — KETOROLAC TROMETHAMINE 15 MG: 15 INJECTION, SOLUTION INTRAMUSCULAR; INTRAVENOUS at 08:45

## 2024-05-20 RX ADMIN — HEPARIN SODIUM 5000 UNITS: 5000 INJECTION INTRAVENOUS; SUBCUTANEOUS at 08:46

## 2024-05-20 RX ADMIN — SODIUM CHLORIDE, PRESERVATIVE FREE 10 ML: 5 INJECTION INTRAVENOUS at 08:47

## 2024-05-20 RX ADMIN — LISINOPRIL 10 MG: 10 TABLET ORAL at 08:45

## 2024-05-20 RX ADMIN — AMIODARONE HYDROCHLORIDE 200 MG: 200 TABLET ORAL at 08:45

## 2024-05-20 RX ADMIN — SPIRONOLACTONE 12.5 MG: 25 TABLET, FILM COATED ORAL at 08:45

## 2024-05-20 RX ADMIN — SODIUM CHLORIDE: 9 INJECTION, SOLUTION INTRAVENOUS at 04:04

## 2024-05-20 RX ADMIN — POLYETHYLENE GLYCOL 3350 17 G: 17 POWDER, FOR SOLUTION ORAL at 08:45

## 2024-05-20 RX ADMIN — ACETAMINOPHEN 650 MG: 325 TABLET ORAL at 07:08

## 2024-05-20 RX ADMIN — SODIUM CHLORIDE, PRESERVATIVE FREE 40 MG: 5 INJECTION INTRAVENOUS at 08:46

## 2024-05-20 RX ADMIN — FUROSEMIDE 20 MG: 20 TABLET ORAL at 08:46

## 2024-05-20 RX ADMIN — ACETAMINOPHEN 650 MG: 325 TABLET ORAL at 18:39

## 2024-05-20 RX ADMIN — ACETAMINOPHEN 650 MG: 325 TABLET ORAL at 12:24

## 2024-05-20 ASSESSMENT — PAIN SCALES - GENERAL
PAINLEVEL_OUTOF10: 6
PAINLEVEL_OUTOF10: 3
PAINLEVEL_OUTOF10: 2

## 2024-05-20 ASSESSMENT — PAIN DESCRIPTION - LOCATION
LOCATION: BUTTOCKS;BACK
LOCATION: BUTTOCKS
LOCATION: BUTTOCKS

## 2024-05-20 NOTE — PROGRESS NOTES
[facility]  Consult Note       NAME:  Suhas Leon  MEDICAL RECORD NUMBER:  [id number]  AGE: 89 y.o.   GENDER: male  : 1925  TODAY'S DATE:  2024    Subjective:      Suhas Leon is a 89 y.o. male with inpatient referral to [facility] for:  \"sacral wound from before admission w/ malnutrition\"      Wound Identification:  Wound Type: pressure  Contributing Factors: chronic pressure, decreased mobility, shear force, and malnutrition    Wound History: patient known to WOC service from prior admission for coccyx wound  Current Wound Care Treatment:  foam dressing removed at time of WOC nurse assessment    Patient Goal of Care:  [x] Wound Healing  [] Odor Control  [] Palliative Care  [] Pain Control   [] Other:         PAST MEDICAL HISTORY        Diagnosis Date    Aspiration pneumonia of right lower lobe due to gastric secretions (HCC) 2024    BPH (benign prostatic hyperplasia)     Cataracts, bilateral     COPD (chronic obstructive pulmonary disease) ([facility])     Former smoker     Hearing loss     History of intermittent claudication     Hyperlipidemia     Intestinal infection due to Clostridium difficile 2019    OA (osteoarthritis) of knee     Pneumonia     Prediabetes     Spinal stenosis        PAST SURGICAL HISTORY    Past Surgical History:   Procedure Laterality Date    CORONARY ARTERY BYPASS GRAFT  1989    EYE SURGERY      Cataracts    IR CHOLECYSTOSTOMY PERCUTANEOUS COMPLETE  2024    IR CHOLECYSTOSTOMY PERCUTANEOUS COMPLETE [facility]    KNEE SURGERY Bilateral     TONSILLECTOMY         FAMILY HISTORY    Family History   Problem Relation Age of Onset    Cancer Mother     Parkinson's Disease Father     Heart Disease Father        SOCIAL HISTORY    Social History     Tobacco Use    Smoking status: Former     Current packs/day: 0.00     Types: Cigarettes     Start date: 1943     Quit date: 1950     Years since  pillows under calves.    [] Heel protective boots (heel medix boots) at all times while in bed.    [] Apply zinc oxide cream twice daily and as needed after incontinent episodes.    [] Perform routine incontinence care with use of foam cleanser.    [x] Use single layer moisture wicking underpad.    [] Use comfort glide system and wedges to reposition patient.    [x] Keep the head of the bed below 30 degrees unless contraindicated.    [] Pressure reducing chair cushion while up to chair. Reposition every hour while in chair and limit chair time to 2 hour intervals.    [x] Encourage good nutritional intake and fluids. Consult dietician if needed.    Specialty Bed Required : Yes   [] Low Air Loss   [x] Pressure Redistribution  [] Fluid Immersion  [] Bariatric  [] Total Pressure Relief  [] Other:     Current Diet: Diet NPO  Diet NPO  ADULT TUBE FEEDING; Nasogastric; Standard with Fiber; Continuous; 49; No; 37; Q 1 hour; Wound Healing; 1 Dose; BID  Dietician consult:  Yes    Discharge Plan:  Placement for patient upon discharge: skilled nursing   Patient appropriate for Outpatient Wound Care Center: Yes    Patient/Caregiver Teaching:  Level of patientunderstanding able to:     [] Indicates understanding       [x] Needs reinforcement  [] Unsuccessful      [] Verbal Understanding  [] Demonstrated understanding       [] No evidence of learning  [] Refused teaching         [] N/A         Contact the Wound Ostomy RN on-call during working hours Monday-Friday [phone] via [employer] by searching \"wound\" under \"groups\" and selecting the on-call clinician. Sending messages via individual names will not reach a clinician.    Electronically signed by PATRICK ARROYO RN on  5/20/2024 at 2:48 PM

## 2024-05-20 NOTE — PROGRESS NOTES
[facility] Gu[facility] M.D., M.H[facility]A.  Migel Shi M.D., M.B.A.  UZIEL Toscano P.A.C.    [facility]  [address]    Cardio-Oncology Service  [address], [address]  [place] [address]   [facility] [place] [address]  Phone: [phone]   [place] [id number]   Phone: [phone]  Fax: [phone]    [phone]   Fax: [phone]          DAILY HOSPITAL PROGRESS NOTE     # DAYS IN HOSPITAL: 3  ADMIT DATE: 5/17/2024        SUBJECTIVE:     Cardiology continuing to follow  Cleared from cardiovascular standpoint for PEG tube placement.    Called yesterday for elevated blood pressures.  Patient's lisinopril increased to 10 mg daily.  Blood pressure remains elevated today.  He is on his home dose of metoprolol and Aldactone.  Jardiance held for procedure.    Will increase lisinopril to 10 mg twice daily today.  Would tolerate some permissive hypertension given patient's age, high fall risk.    Patient has no questions today from cardiac standpoint.  Denies chest pain, chest tightness or shortness of breath.  Denies lower extremity edema.    He follows with [facility] physicians cardiology, last seen roughly 1 year ago.  He does have known history of coronary artery disease status post CABG x 4 in 1993.  Known history of nonsustained V. tach on amiodarone 200 mg daily.  History of hypertension, hyperlipidemia.  Known history of COPD.  He did have an echocardiogram in November 2023 with an ejection fraction of 20 to 25%, no significant valvular abnormalities.    ROS  Constitutional: Elderly and frail-appearing  HENT: Negative for congestion.    Eyes: Negative for blurred vision and double vision.   Cardiovascular: Negative for chest pain, claudication, dyspnea on exertion, irregular heartbeat, leg swelling, near-syncope, palpitations and  syncope.   Respiratory: Negative for cough, shortness of breath and wheezing.    Hematologic/Lymphatic: Negative for bleeding problem. Does not bruise/bleed easily.   Skin: Negative for color change, itching, poor wound healing and rash.   Musculoskeletal: Negative for arthritis.   Gastrointestinal: Negative for bloating, abdominal pain, constipation, diarrhea, heartburn, nausea and vomiting.   Neurological: Negative for dizziness, light-headedness, loss of balance, numbness, seizures, vertigo and weakness.   Psychiatric/Behavioral: The patient is not nervous/anxious.        VITALS:       Vitals:    05/19/24 2335 05/20/24 0402 05/20/24 0600 05/20/24 0841   BP: (!) 150/63 (!) 149/63  (!) 166/64   Pulse: 59 57  56   Resp: 16 16  16   Temp: 97.6 °F (36.4 °C) 98.1 °F (36.7 °C)  (!) 96.7 °F (35.9 °C)   TempSrc: Oral Oral  Temporal   SpO2: 95% 98%  94%   Weight:   44.9 kg (98 lb 15.8 oz)    Height:         I/O last 3 completed shifts:  In: 2778 [I.V.:1929; NG/GT:849]  Out: 920 [Urine:900; Drains:20]  I/O this shift:  In: -   Out: 300 [Urine:300]      PHYSICAL EXAM:     General: WDWN in NAD. A and O x 3  HEENT: EOMI, Sclera anicteric, Conjunctiva pink  Neck: Supple, No JVP or HJR, No carotid bruits  Lungs: Bilateral congestion/rales  Heart: RRR  Abdomen: soft non tender, good bowel sounds, No HSM  Ext: no edema,  Skin: no discoloration  Vascular: distal pulses intact      CURRENT MEDICATIONS:      lisinopril  10 mg Oral BID    heparin (porcine)  5,000 Units SubCUTAneous BID    acetaminophen  650 mg Per NG tube 4 times per day    Or    acetaminophen  650 mg Rectal 4 times per day    sodium chloride flush  5-40 mL IntraVENous 2 times per day    melatonin  3 mg Oral Nightly    polyethylene glycol  17 g Oral Daily    amiodarone  200 mg Oral Daily    [Held by provider] finasteride  5 mg Oral Daily    fluticasone  1 spray Each Nostril Daily    furosemide  20 mg Oral Daily    metoprolol succinate  12.5 mg Oral Nightly

## 2024-05-20 NOTE — PROGRESS NOTES
Occupational Therapy    [facility]  Occupational Therapy Not Seen Note    DATE: 2024    NAME: Suhas Leon  MRN: [id number]   : 1925      Patient not seen this date for Occupational Therapy due to:    Other: defer tx this date as pt tired. Pt to undergo PEG tube placement . Will continue to pursue as appropriate    Next Scheduled Treatment:     Electronically signed by MANDO BAKER OT on 2024 at 3:52 PM

## 2024-05-20 NOTE — DISCHARGE SUMMARY
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)    Pressure ulcer of sacral region, unspecified stage    Acute on chronic systolic congestive heart failure (HCC)    Other dysphagia    Hyperlipidemia    Coronary artery disease involving coronary bypass graft of native heart    Aspiration pneumonitis (HCC)    Generalized weakness    Cachexia (HCC)    Other emphysema (HCC)    Benign prostatic hyperplasia with incomplete bladder emptying    Former smoker    Chronic kidney disease, stage 2 (mild)    Spinal stenosis    Bilateral pleural effusion    QT prolongation    Cholecystostomy care (HCC)  Resolved Problems:    * No resolved hospital problems. *      Admission Condition:  serious     Discharged Condition:      Hospital Stay:     Hospital Course:  Suhas eLon is a 89 y.o. male with a PMH of severe malnutrition, cachexia, sacral decubitus ulcer, heart failure with preserved ejection fraction, coronary artery disease, hypertensive heart disease with chronic kidney disease, former smoker, hyperlipidemia, COPD with a baseline on room air, who presented to ED from [facility] with complaint of Shortness of Breath.    In the ED, patient patient was afebrile, with unlabored breathing on 1 L oxygen as needed. Workup showed a possible aspirational pneumonia verses chemical pneumonitis with reactive leukocytosis and

## 2024-05-20 NOTE — PROGRESS NOTES
[facility] Residency  Inpatient Service     Progress Note  5/18/2024    8:04 AM    Name:   Suhas Leon  MRN:     [id number]     Acct:      [id number]   Room:   323/323-01   Day:  1  Admit Date:  5/17/2024 12:16 PM    PCP:   Marcello Elise MD  Code Status:  DNR-CCA    Subjective:     Overnight events: None    Pt was examined at bedside.  Suhas says that he was slept well but that he is having having increased pain in his lower back from his sacral wound and that he is thirsty; and his cough is about the same. We discussed that the surgery is scheduled for tomorrow we're planning on increase his NG tube feeds and that wound care should see him today.  I let him know that we plan to give him something for his sacral/back pain.  He denies chest pain, palpitation, shortness of breath, and hunger, no sweating or fevers or other new symptoms.        Medications:     Allergies:  No Known Allergies    Current Meds:   Scheduled Meds:    acetaminophen  650 mg Per NG tube 4 times per day    Or    acetaminophen  650 mg Rectal 4 times per day    sodium chloride flush  5-40 mL IntraVENous 2 times per day    melatonin  3 mg Oral Nightly    polyethylene glycol  17 g Oral Daily    amiodarone  200 mg Oral Daily    [Held by provider] finasteride  5 mg Oral Daily    fluticasone  1 spray Each Nostril Daily    furosemide  20 mg Oral Daily    lisinopril  2.5 mg Oral Daily    metoprolol succinate  12.5 mg Oral Nightly    spironolactone  12.5 mg Oral Daily    [Held by provider] empagliflozin  10 mg Oral Daily    heparin (porcine)  5,000 Units SubCUTAneous BID    pantoprazole (PROTONIX) 40 mg in sodium chloride (PF) 0.9 % 10 mL injection  40 mg IntraVENous Daily     Continuous Infusions:    sodium chloride      sodium chloride 90 mL/hr at 05/18/24 0003    dextrose       PRN Meds: sodium chloride flush, sodium chloride, ondansetron **OR** ondansetron, senna, sodium chloride, glucose,  wife later today.     Celia Mitchell DO, MPH  5/20/2024

## 2024-05-20 NOTE — CARE COORDINATION
in to see patient and wife for discharge planning. Patient agreeable to go to a skilled facility for discharge .Wife would like referral sent to [facility]. Referral sent, waiting on acceptance before precert gets started.

## 2024-05-20 NOTE — PLAN OF CARE
Problem: Chronic Conditions and Co-morbidities  Goal: Patient's chronic conditions and co-morbidity symptoms are monitored and maintained or improved  Outcome: Not Progressing - pt and wife needing extensive education to assist in understanding patient's health issues and required level of care moving forward.   Flowsheets (Taken 5/20/2024 0845)  Care Plan - Patient's Chronic Conditions and Co-Morbidity Symptoms are Monitored and Maintained or Improved:   Monitor and assess patient's chronic conditions and comorbid symptoms for stability, deterioration, or improvement   Collaborate with multidisciplinary team to address chronic and comorbid conditions and prevent exacerbation or deterioration   Update acute care plan with appropriate goals if chronic or comorbid symptoms are exacerbated and prevent overall improvement and discharge    Problem: Discharge Planning  Goal: Discharge to home or other facility with appropriate resources  5/20/2024 1511 by Laura Underwood RN  Outcome: Progressing  Flowsheets (Taken 5/20/2024 0845)  Discharge to home or other facility with appropriate resources:   Identify barriers to discharge with patient and caregiver   Arrange for needed discharge resources and transportation as appropriate   Identify discharge learning needs (meds, wound care, etc)   Arrange for interpreters to assist at discharge as needed   Refer to discharge planning if patient needs post-hospital services based on physician order or complex needs related to functional status, cognitive ability or social support system  5/20/2024 0254 by Delaney Vázquez RN  Outcome: Progressing     Problem: Pain  Goal: Verbalizes/displays adequate comfort level or baseline comfort level  5/20/2024 1511 by Laura Underwood RN  Outcome: Progressing  Flowsheets (Taken 5/20/2024 0845)  Verbalizes/displays adequate comfort level or baseline comfort level:   Encourage patient to monitor pain and request assistance   Assess pain using  Well-Baby Checkup: Up to 1 Month    After your first  visit, your baby will likely have a checkup within his or her first month of life. At this checkup, the healthcare provider will examine the baby and ask how things are going at home.  This shee · Ask the healthcare provider if your baby should take vitamin D.  · Don't give the baby anything to eat besides breastmilk or formula. Your baby is too young for solid foods (“solids”) or other liquids. An infant this age does not need to be given water. · Put your baby on his or her back for naps and sleeping until your child is 3year old. This can lower the risk for SIDS (sudden infant death syndrome), aspiration, and choking. Never put your baby on his or her side or stomach for sleep or naps.  When you · Don't share a bed (co-sleep) with your baby. Bed-sharing has been shown to increase the risk for SIDS. The American Academy of Pediatrics says that babies should sleep in the same room as their parents.  They should be close to their parents' bed, but in · Older siblings will likely want to hold, play with, and get to know the baby. This is fine as long as an adult supervises. · Call the healthcare provider right away if the baby has a fever (see Fever and children, below).     Vaccines  Based on recommend · Feeling worthless or guilty  · Fearing that your baby will be harmed  · Worrying that you’re a bad parent  · Having trouble thinking clearly or making decisions  · Thinking about death or suicide  If you have any of these symptoms, talk to your OB/GYN or

## 2024-05-20 NOTE — PROGRESS NOTES
Nutrition Note    Spoke with patient's primary provider and he requested tht we advance the patient's TF to goal. Resident's TF per NG tube has been advanced to Jevity 1.5 @ 49ml/hr x 24 hours proving 1166ml, 1750kcal, 74g protein and 886ml free water. Also receiving additional 37ml free water flushes Q 1 hour and Beto BID for additional protein for wound healing. Total free water between TF and flushes is 1774ml per 24 hours.  No other changes at this time, will follow this week for an s/s of intolerance Continue to tomorrow tolerance and weights.    Electronically signed by Shanda Powers RD on 5/20/24 at 1:31 PM EDT    Contact: [phone]

## 2024-05-21 LAB
EKG ATRIAL RATE: 67 BPM
EKG ATRIAL RATE: 69 BPM
EKG P AXIS: 35 DEGREES
EKG P AXIS: 99 DEGREES
EKG P-R INTERVAL: 204 MS
EKG P-R INTERVAL: 206 MS
EKG Q-T INTERVAL: 384 MS
EKG Q-T INTERVAL: 436 MS
EKG QRS DURATION: 126 MS
EKG QRS DURATION: 128 MS
EKG QTC CALCULATION (BAZETT): 411 MS
EKG QTC CALCULATION (BAZETT): 460 MS
EKG R AXIS: 30 DEGREES
EKG R AXIS: 34 DEGREES
EKG T AXIS: 102 DEGREES
EKG T AXIS: 102 DEGREES
EKG VENTRICULAR RATE: 67 BPM
EKG VENTRICULAR RATE: 69 BPM

## 2024-05-21 NOTE — PROGRESS NOTES
SPIRITUAL CARE DEPARTMENT [facility]  PROGRESS NOTE    Room # 323/323-01   Name: Suhas Leon            Presybeterian:Rastafari    Reason for visit: Follow up    I visited the patient.    Admit Date & Time: 5/17/2024 12:16 PM    Assessment:  Suhas Leon is a 89 y.o. male in the hospital because severe protein calorie malnutrition. Upon entering the room pt was friendly and open to conversation.      Intervention:  I introduced myself and my title as  I offered space for Pt  to express feelings, needs, and concerns and provided a ministry presence.  provided support and care to Pt. passed at 731.  called mortuary and spoke to spouse to discover final wishes.    Outcome:  Pt passed. Spouse expressed severe grief at the passing of Pt. Spouse expressed appreciation of  connection and phone call.    Plan:  Chaplains will remain available to offer spiritual and emotional support as needed.    Electronically signed by Chaplain Selin, on 5/20/2024 at 8:41 PM.  Spiritual Care Department  [facility]      05/20/24 2037   Encounter Summary   Encounter Overview/Reason Grief, Loss, and Adjustments;Advance Care Planning   Service Provided For Family   Referral/Consult From Nurse   Support System Spouse   Last Encounter  05/19/24   Complexity of Encounter High   Begin Time 1615   End Time  1625   Total Time Calculated 10 min   Crisis   Type Trauma;Emotional distress   Spiritual/Emotional needs   Type Spiritual Support   Grief, Loss, and Adjustments   Type Anticipatory Grief;Life Adjustments;Death   Assessment/Intervention/Outcome   Assessment Coping;Complicated grieving   Intervention End of Life Care   Outcome Comfort;Acceptance;Coping   Plan and Referrals   Plan/Referrals Guidance concerning next steps/process to be expected;Assisted with grief resources

## 2024-05-22 LAB
MICROORGANISM SPEC CULT: NORMAL
MICROORGANISM SPEC CULT: NORMAL
SERVICE CMNT-IMP: NORMAL
SERVICE CMNT-IMP: NORMAL
SPECIMEN DESCRIPTION: NORMAL
SPECIMEN DESCRIPTION: NORMAL

## 2024-05-23 LAB
MICROORGANISM SPEC CULT: ABNORMAL
MICROORGANISM/AGENT SPEC: ABNORMAL
SPECIMEN DESCRIPTION: ABNORMAL

## 2024-05-23 NOTE — PROGRESS NOTES
This nurse was called into patients room at shift change by Karishma GALLARDO after patient was found to be unresponsive with no pulse. This nurse also assessed that patient had no pulse in the jugular, radial, or femoral, no breath sounds, and no heart sounds. No blood pressure to be found. Patient was DNRCCA, notified Dr Merced Askew of above findings. Physicians at bedside to declare patient time of death.  notified.

## 2024-05-24 NOTE — PROGRESS NOTES
Physician Progress Note      PATIENT:               MAXWELL PACE  CSN #:                  [id number]  :                       1925  ADMIT DATE:       2024 12:16 PM  DISCH DATE:        2024 9:56 PM  RESPONDING  PROVIDER #:        Celia Street DO          QUERY TEXT:    Patient admitted with pneumonia. Likely type 2 NSTEMI documented in H and P.   Troponin HS 36, 32. Per documentation pt denies chest pain. If possible,   please document in the progress notes and discharge summary if you are   evaluating and/or treating any of the following:    The medical record reflects the following:  Risk Factors: htn, CAD  Clinical Indicators: Likely type 2 NSTEMI documented in H and P. Troponin HS   36, 32. Per documentation pt denies chest pain  Treatment: heparin 5,000 bid, Monitor Troponin HS, and vital signs    Thank you for your time, Elsa VINSON, RN CDS. If you have questions, please   call [phone] ([id number]).  Options provided:  -- NSTEMI  -- Type 2 MI  -- Demand Ischemia only, no MI  -- Other - I will add my own diagnosis  -- Disagree - Not applicable / Not valid  -- Disagree - Clinically unable to determine / Unknown  -- Refer to Clinical Documentation Reviewer    PROVIDER RESPONSE TEXT:    This patient has a Type 2 MI.    Query created by: Elsa Guadarrama on 2024 5:27 PM      Electronically signed by:  Celia Street DO 2024 11:13 PM

## 2025-04-15 NOTE — PATIENT INSTRUCTIONS
Thank you for following up with us at [facility] outpatient residency clinic! It was a pleasure to meet you today! Today we discussed the below as next steps in your care:  Continue to monitor the hand and if it gets worse call us and let us know    If you have any additional questions or concerns, please call the office ([phone]) and speak to one of the staff. They will triage and forward the message to the doctors! Have a great rest of your day! [Negative] : Heme/Lymph